# Patient Record
Sex: FEMALE | Race: BLACK OR AFRICAN AMERICAN | Employment: OTHER | URBAN - METROPOLITAN AREA
[De-identification: names, ages, dates, MRNs, and addresses within clinical notes are randomized per-mention and may not be internally consistent; named-entity substitution may affect disease eponyms.]

---

## 2021-03-22 ENCOUNTER — HOSPITAL ENCOUNTER (INPATIENT)
Age: 76
LOS: 17 days | Discharge: NURSING, REHAB OR LONG-TERM CARE | DRG: 689 | End: 2021-04-08
Attending: EMERGENCY MEDICINE | Admitting: INTERNAL MEDICINE
Payer: MEDICARE

## 2021-03-22 ENCOUNTER — APPOINTMENT (OUTPATIENT)
Dept: CT IMAGING | Age: 76
DRG: 689 | End: 2021-03-22
Attending: EMERGENCY MEDICINE
Payer: MEDICARE

## 2021-03-22 DIAGNOSIS — G93.40 ENCEPHALOPATHY: ICD-10-CM

## 2021-03-22 DIAGNOSIS — F20.9 SCHIZOPHRENIA, UNSPECIFIED TYPE (HCC): ICD-10-CM

## 2021-03-22 DIAGNOSIS — F03.90 DEMENTIA WITHOUT BEHAVIORAL DISTURBANCE, UNSPECIFIED DEMENTIA TYPE: ICD-10-CM

## 2021-03-22 DIAGNOSIS — R41.0 DELIRIUM: Primary | ICD-10-CM

## 2021-03-22 DIAGNOSIS — N89.8 VAGINAL DISCHARGE: ICD-10-CM

## 2021-03-22 DIAGNOSIS — Z51.5 ENCOUNTER FOR PALLIATIVE CARE: ICD-10-CM

## 2021-03-22 DIAGNOSIS — N39.0 URINARY TRACT INFECTION WITHOUT HEMATURIA, SITE UNSPECIFIED: ICD-10-CM

## 2021-03-22 DIAGNOSIS — R13.10 DYSPHAGIA, UNSPECIFIED TYPE: ICD-10-CM

## 2021-03-22 PROBLEM — G93.41 ACUTE METABOLIC ENCEPHALOPATHY: Status: ACTIVE | Noted: 2021-03-22

## 2021-03-22 LAB
ALBUMIN SERPL-MCNC: 4.1 G/DL (ref 3.2–4.6)
ALBUMIN/GLOB SERPL: 1.2 {RATIO} (ref 1.2–3.5)
ALP SERPL-CCNC: 68 U/L (ref 50–136)
ALT SERPL-CCNC: 16 U/L (ref 12–65)
ANION GAP SERPL CALC-SCNC: 8 MMOL/L (ref 7–16)
AST SERPL-CCNC: 30 U/L (ref 15–37)
ATRIAL RATE: 85 BPM
BACTERIA URNS QL MICRO: ABNORMAL /HPF
BASOPHILS # BLD: 0 K/UL (ref 0–0.2)
BASOPHILS NFR BLD: 1 % (ref 0–2)
BILIRUB SERPL-MCNC: 0.6 MG/DL (ref 0.2–1.1)
BUN SERPL-MCNC: 12 MG/DL (ref 8–23)
CALCIUM SERPL-MCNC: 9.7 MG/DL (ref 8.3–10.4)
CALCULATED P AXIS, ECG09: 80 DEGREES
CALCULATED R AXIS, ECG10: 82 DEGREES
CALCULATED T AXIS, ECG11: 70 DEGREES
CASTS URNS QL MICRO: 0 /LPF
CHLORIDE SERPL-SCNC: 110 MMOL/L (ref 98–107)
CO2 SERPL-SCNC: 25 MMOL/L (ref 21–32)
CREAT SERPL-MCNC: 1.15 MG/DL (ref 0.6–1)
CRYSTALS URNS QL MICRO: 0 /LPF
DIAGNOSIS, 93000: NORMAL
DIFFERENTIAL METHOD BLD: ABNORMAL
EOSINOPHIL # BLD: 0 K/UL (ref 0–0.8)
EOSINOPHIL NFR BLD: 1 % (ref 0.5–7.8)
EPI CELLS #/AREA URNS HPF: ABNORMAL /HPF
ERYTHROCYTE [DISTWIDTH] IN BLOOD BY AUTOMATED COUNT: 14.5 % (ref 11.9–14.6)
GLOBULIN SER CALC-MCNC: 3.3 G/DL (ref 2.3–3.5)
GLUCOSE BLD STRIP.AUTO-MCNC: 136 MG/DL (ref 65–100)
GLUCOSE SERPL-MCNC: 89 MG/DL (ref 65–100)
HCT VFR BLD AUTO: 38.5 % (ref 35.8–46.3)
HGB BLD-MCNC: 12.1 G/DL (ref 11.7–15.4)
IMM GRANULOCYTES # BLD AUTO: 0 K/UL (ref 0–0.5)
IMM GRANULOCYTES NFR BLD AUTO: 0 % (ref 0–5)
LACTATE SERPL-SCNC: 1.5 MMOL/L (ref 0.4–2)
LACTATE SERPL-SCNC: 1.7 MMOL/L (ref 0.4–2)
LYMPHOCYTES # BLD: 1.5 K/UL (ref 0.5–4.6)
LYMPHOCYTES NFR BLD: 37 % (ref 13–44)
MAGNESIUM SERPL-MCNC: 1.9 MG/DL (ref 1.8–2.4)
MCH RBC QN AUTO: 30.3 PG (ref 26.1–32.9)
MCHC RBC AUTO-ENTMCNC: 31.4 G/DL (ref 31.4–35)
MCV RBC AUTO: 96.3 FL (ref 79.6–97.8)
MONOCYTES # BLD: 0.4 K/UL (ref 0.1–1.3)
MONOCYTES NFR BLD: 9 % (ref 4–12)
MUCOUS THREADS URNS QL MICRO: 0 /LPF
NEUTS SEG # BLD: 2.1 K/UL (ref 1.7–8.2)
NEUTS SEG NFR BLD: 53 % (ref 43–78)
NRBC # BLD: 0 K/UL (ref 0–0.2)
OTHER OBSERVATIONS,UCOM: ABNORMAL
P-R INTERVAL, ECG05: 118 MS
PLATELET # BLD AUTO: 164 K/UL (ref 150–450)
PMV BLD AUTO: 9.9 FL (ref 9.4–12.3)
POTASSIUM SERPL-SCNC: 4.3 MMOL/L (ref 3.5–5.1)
PROT SERPL-MCNC: 7.4 G/DL (ref 6.3–8.2)
Q-T INTERVAL, ECG07: 342 MS
QRS DURATION, ECG06: 62 MS
QTC CALCULATION (BEZET), ECG08: 406 MS
RBC # BLD AUTO: 4 M/UL (ref 4.05–5.2)
RBC #/AREA URNS HPF: ABNORMAL /HPF
SERVICE CMNT-IMP: ABNORMAL
SODIUM SERPL-SCNC: 143 MMOL/L (ref 136–145)
TROPONIN-HIGH SENSITIVITY: 28.5 PG/ML (ref 0–14)
TSH SERPL DL<=0.005 MIU/L-ACNC: 0.54 UIU/ML (ref 0.36–3.74)
VENTRICULAR RATE, ECG03: 85 BPM
WBC # BLD AUTO: 4.1 K/UL (ref 4.3–11.1)
WBC URNS QL MICRO: ABNORMAL /HPF

## 2021-03-22 PROCEDURE — 86580 TB INTRADERMAL TEST: CPT | Performed by: INTERNAL MEDICINE

## 2021-03-22 PROCEDURE — 84484 ASSAY OF TROPONIN QUANT: CPT

## 2021-03-22 PROCEDURE — 83735 ASSAY OF MAGNESIUM: CPT

## 2021-03-22 PROCEDURE — 93005 ELECTROCARDIOGRAM TRACING: CPT | Performed by: EMERGENCY MEDICINE

## 2021-03-22 PROCEDURE — 82962 GLUCOSE BLOOD TEST: CPT

## 2021-03-22 PROCEDURE — 81015 MICROSCOPIC EXAM OF URINE: CPT

## 2021-03-22 PROCEDURE — 87086 URINE CULTURE/COLONY COUNT: CPT

## 2021-03-22 PROCEDURE — 74011250636 HC RX REV CODE- 250/636: Performed by: INTERNAL MEDICINE

## 2021-03-22 PROCEDURE — 85025 COMPLETE CBC W/AUTO DIFF WBC: CPT

## 2021-03-22 PROCEDURE — 84443 ASSAY THYROID STIM HORMONE: CPT

## 2021-03-22 PROCEDURE — 70450 CT HEAD/BRAIN W/O DYE: CPT

## 2021-03-22 PROCEDURE — 87040 BLOOD CULTURE FOR BACTERIA: CPT

## 2021-03-22 PROCEDURE — 74011000258 HC RX REV CODE- 258: Performed by: EMERGENCY MEDICINE

## 2021-03-22 PROCEDURE — 74011250636 HC RX REV CODE- 250/636: Performed by: EMERGENCY MEDICINE

## 2021-03-22 PROCEDURE — 74011000302 HC RX REV CODE- 302: Performed by: INTERNAL MEDICINE

## 2021-03-22 PROCEDURE — 83605 ASSAY OF LACTIC ACID: CPT

## 2021-03-22 PROCEDURE — 96374 THER/PROPH/DIAG INJ IV PUSH: CPT

## 2021-03-22 PROCEDURE — 80053 COMPREHEN METABOLIC PANEL: CPT

## 2021-03-22 PROCEDURE — 65660000000 HC RM CCU STEPDOWN

## 2021-03-22 PROCEDURE — 99283 EMERGENCY DEPT VISIT LOW MDM: CPT

## 2021-03-22 RX ORDER — ONDANSETRON 2 MG/ML
4 INJECTION INTRAMUSCULAR; INTRAVENOUS
Status: DISCONTINUED | OUTPATIENT
Start: 2021-03-22 | End: 2021-04-08 | Stop reason: HOSPADM

## 2021-03-22 RX ORDER — ENOXAPARIN SODIUM 100 MG/ML
40 INJECTION SUBCUTANEOUS DAILY
Status: DISCONTINUED | OUTPATIENT
Start: 2021-03-23 | End: 2021-04-08 | Stop reason: HOSPADM

## 2021-03-22 RX ORDER — POLYETHYLENE GLYCOL 3350 17 G/17G
17 POWDER, FOR SOLUTION ORAL DAILY PRN
Status: DISCONTINUED | OUTPATIENT
Start: 2021-03-22 | End: 2021-04-08 | Stop reason: HOSPADM

## 2021-03-22 RX ORDER — HALOPERIDOL 5 MG/ML
INJECTION INTRAMUSCULAR
Status: DISCONTINUED
Start: 2021-03-22 | End: 2021-03-22 | Stop reason: WASHOUT

## 2021-03-22 RX ORDER — ACETAMINOPHEN 650 MG/1
650 SUPPOSITORY RECTAL
Status: DISCONTINUED | OUTPATIENT
Start: 2021-03-22 | End: 2021-04-08 | Stop reason: HOSPADM

## 2021-03-22 RX ORDER — PROMETHAZINE HYDROCHLORIDE 25 MG/1
12.5 TABLET ORAL
Status: DISCONTINUED | OUTPATIENT
Start: 2021-03-22 | End: 2021-04-08 | Stop reason: HOSPADM

## 2021-03-22 RX ORDER — ACETAMINOPHEN 325 MG/1
650 TABLET ORAL
Status: DISCONTINUED | OUTPATIENT
Start: 2021-03-22 | End: 2021-04-08 | Stop reason: HOSPADM

## 2021-03-22 RX ORDER — HYDRALAZINE HYDROCHLORIDE 20 MG/ML
20 INJECTION INTRAMUSCULAR; INTRAVENOUS
Status: DISCONTINUED | OUTPATIENT
Start: 2021-03-22 | End: 2021-04-08 | Stop reason: HOSPADM

## 2021-03-22 RX ORDER — SODIUM CHLORIDE 0.9 % (FLUSH) 0.9 %
5-40 SYRINGE (ML) INJECTION EVERY 8 HOURS
Status: DISCONTINUED | OUTPATIENT
Start: 2021-03-22 | End: 2021-04-08 | Stop reason: HOSPADM

## 2021-03-22 RX ORDER — SODIUM CHLORIDE 0.9 % (FLUSH) 0.9 %
5-40 SYRINGE (ML) INJECTION AS NEEDED
Status: DISCONTINUED | OUTPATIENT
Start: 2021-03-22 | End: 2021-04-08 | Stop reason: HOSPADM

## 2021-03-22 RX ADMIN — Medication 10 ML: at 17:25

## 2021-03-22 RX ADMIN — Medication 10 ML: at 22:00

## 2021-03-22 RX ADMIN — CEFTRIAXONE SODIUM 1 G: 1 INJECTION, POWDER, FOR SOLUTION INTRAMUSCULAR; INTRAVENOUS at 13:57

## 2021-03-22 RX ADMIN — TUBERCULIN PURIFIED PROTEIN DERIVATIVE 5 UNITS: 5 INJECTION, SOLUTION INTRADERMAL at 17:23

## 2021-03-22 RX ADMIN — HYDRALAZINE HYDROCHLORIDE 20 MG: 20 INJECTION, SOLUTION INTRAMUSCULAR; INTRAVENOUS at 17:25

## 2021-03-22 NOTE — ED TRIAGE NOTES
Patient ambulatory to triage with mask in place. Family reports she has been acting right since 3/18. Family is concerned about UTI or CVA. Family reports she has been dragging her legs and has not had an appetite.

## 2021-03-22 NOTE — PROGRESS NOTES
TRANSFER - IN REPORT:    Verbal report received from humera(name) on [place]  being received from er(unit) for routine progression of care      Report consisted of patients Situation, Background, Assessment and   Recommendations(SBAR). Information from the following report(s) ED Summary was reviewed with the receiving nurse. Opportunity for questions and clarification was provided. Assessment completed upon patients arrival to unit and care assumed. awaiting arrival, report to kyrie garcia

## 2021-03-22 NOTE — ED NOTES
TRANSFER - OUT REPORT:    Verbal report given to [facility] 21 (name) on [facility]  being transferred to [address] [place] [address] (unit) for routine progression of care       Report consisted of patients Situation, Background, Assessment and   Recommendations(SBAR). Information from the following report(s) SBAR was reviewed with the receiving nurse. Lines:   Peripheral IV 03/22/21 Left Hand (Active)        Opportunity for questions and clarification was provided.       Patient transported with:   [employer]

## 2021-03-22 NOTE — PROGRESS NOTES
Pt agitated and restless. MD made aware, orders for soft wrist restraints placed. Will continue to monitor.

## 2021-03-22 NOTE — ED PROVIDER NOTES
The history is provided by a relative. Altered mental status   This is a recurrent problem. Episode onset: 4 days ago. Associated symptoms include confusion. Pertinent negatives include no hallucinations. Associated symptoms comments: Speaking less and increased difficulty with gait. Risk factors include dementia (schizophrenia). Her past medical history is significant for dementia and psychotropic medication treatment. Past medical history comments: kidney cancer, htn no meds now, schizophrenia. No past medical history on file. No past surgical history on file. No family history on file.     Social History     Socioeconomic History    Marital status: Not on file     Spouse name: Not on file    Number of children: Not on file    Years of education: Not on file    Highest education level: Not on file   Occupational History    Not on file   Social Needs    Financial resource strain: Not on file    Food insecurity     Worry: Not on file     Inability: Not on file    Transportation needs     Medical: Not on file     Non-medical: Not on file   Tobacco Use    Smoking status: Not on file   Substance and Sexual Activity    Alcohol use: Not on file    Drug use: Not on file    Sexual activity: Not on file   Lifestyle    Physical activity     Days per week: Not on file     Minutes per session: Not on file    Stress: Not on file   Relationships    Social connections     Talks on phone: Not on file     Gets together: Not on file     Attends Mandaen service: Not on file     Active member of club or organization: Not on file     Attends meetings of clubs or organizations: Not on file     Relationship status: Not on file    Intimate partner violence     Fear of current or ex partner: Not on file     Emotionally abused: Not on file     Physically abused: Not on file     Forced sexual activity: Not on file   Other Topics Concern    Not on file   Social History Narrative    Not on file ALLERGIES: Patient has no known allergies. Review of Systems   Unable to perform ROS: Mental status change   Constitutional: Negative for chills and fever. HENT: Negative for congestion and rhinorrhea. Respiratory: Negative for cough and shortness of breath. Gastrointestinal: Negative for diarrhea and vomiting. Genitourinary:        Foul odor for months to urine per son     Psychiatric/Behavioral: Positive for confusion. Negative for hallucinations. Vitals:    03/22/21 1122   BP: (!) 172/104   Pulse: 65   Resp: 16   Temp: 98.1 °F (36.7 °C)   SpO2: 98%   Weight: 63.5 kg (140 lb)   Height: 5' 4\" (1.626 m)            Physical Exam  Vitals signs and nursing note reviewed. Constitutional:       General: She is not in acute distress. Appearance: She is well-developed. HENT:      Head: Normocephalic. Eyes:      General: No visual field deficit. Pupils: Pupils are equal, round, and reactive to light. Cardiovascular:      Rate and Rhythm: Normal rate and regular rhythm. Heart sounds: Normal heart sounds. Pulmonary:      Effort: Pulmonary effort is normal.      Breath sounds: Normal breath sounds. Abdominal:      General: There is no distension. Palpations: Abdomen is soft. There is no mass. Tenderness: There is no abdominal tenderness. There is no guarding or rebound. Musculoskeletal: Normal range of motion. Lymphadenopathy:      Cervical: No cervical adenopathy. Skin:     General: Skin is warm and dry. Neurological:      Mental Status: She is alert. GCS: GCS eye subscore is 4. GCS verbal subscore is 3. GCS motor subscore is 6. Cranial Nerves: Dysarthria present. No cranial nerve deficit or facial asymmetry. Sensory: No sensory deficit. Motor: No weakness. Coordination: Coordination abnormal ( oversoots rt finger to nose, unable to perform heel to shin).       Gait: Gait abnormal.   Psychiatric:         Attention and Perception: She is inattentive. Speech: Speech is delayed and slurred. Behavior: Behavior is slowed. MDM  Number of Diagnoses or Management Options  Diagnosis management comments: She has a history of schizophrenia and dementia but is off her baseline since the 18th with increased confusion or difficulty with gait and a less speech. She has been less responsive. Patient's son denies hallucinations on her part. Work-up reveals urinary tract infection and CT of the brain was normal for age. No cough or congestion to suggest pneumonia. Patient will be cultured and UTI treated with Rocephin. Will consult hospitalist for admission given increased confusion and difficulty managing patient at home. Admit for UTI and delirium.        Amount and/or Complexity of Data Reviewed  Clinical lab tests: ordered and reviewed (Results for orders placed or performed during the hospital encounter of 03/22/21  -CBC WITH AUTOMATED DIFF       Result                      Value             Ref Range           WBC                         4.1 (L)           4.3 - 11.1 K*       RBC                         4.00 (L)          4.05 - 5.2 M*       HGB                         12.1              11.7 - 15.4 *       HCT                         38.5              35.8 - 46.3 %       MCV                         96.3              79.6 - 97.8 *       MCH                         30.3              26.1 - 32.9 *       MCHC                        31.4              31.4 - 35.0 *       RDW                         14.5              11.9 - 14.6 %       PLATELET                    164               150 - 450 K/*       MPV                         9.9               9.4 - 12.3 FL       ABSOLUTE NRBC               0.00              0.0 - 0.2 K/*       DF                          AUTOMATED                             NEUTROPHILS                 53                43 - 78 %           LYMPHOCYTES                 37                13 - 44 %           MONOCYTES 9                 4.0 - 12.0 %        EOSINOPHILS                 1                 0.5 - 7.8 %         BASOPHILS                   1                 0.0 - 2.0 %         IMMATURE GRANULOCYTES       0                 0.0 - 5.0 %         ABS. NEUTROPHILS            2.1               1.7 - 8.2 K/*       ABS. LYMPHOCYTES            1.5               0.5 - 4.6 K/*       ABS. MONOCYTES              0.4               0.1 - 1.3 K/*       ABS. EOSINOPHILS            0.0               0.0 - 0.8 K/*       ABS. BASOPHILS              0.0               0.0 - 0.2 K/*       ABS. IMM. GRANS.            0.0               0.0 - 0.5 K/*  -METABOLIC PANEL, COMPREHENSIVE       Result                      Value             Ref Range           Sodium                      143               136 - 145 mm*       Potassium                   4.3               3.5 - 5.1 mm*       Chloride                    110 (H)           98 - 107 mmo*       CO2                         25                21 - 32 mmol*       Anion gap                   8                 7 - 16 mmol/L       Glucose                     89                65 - 100 mg/*       BUN                         12                8 - 23 MG/DL        Creatinine                  1.15 (H)          0.6 - 1.0 MG*       GFR est AA                  59 (L)            >60 ml/min/1*       GFR est non-AA              49 (L)            >60 ml/min/1*       Calcium                     9.7               8.3 - 10.4 M*       Bilirubin, total            0.6               0.2 - 1.1 MG*       ALT (SGPT)                  16                12 - 65 U/L         AST (SGOT)                  30                15 - 37 U/L         Alk.  phosphatase            68                50 - 136 U/L        Protein, total              7.4               6.3 - 8.2 g/*       Albumin                     4.1               3.2 - 4.6 g/*       Globulin                    3.3               2.3 - 3.5 g/*       A-G Ratio 1.2               1.2 - 3.5      -LACTIC ACID       Result                      Value             Ref Range           Lactic acid                 1.5               0.4 - 2.0 MM*  -URINE MICROSCOPIC       Result                      Value             Ref Range           WBC                         20-50             0 /hpf              RBC                         0-3               0 /hpf              Epithelial cells            0-3               0 /hpf              Bacteria                    4+ (H)            0 /hpf              Casts                       0                 0 /lpf              Crystals, urine             0                 0 /LPF              Mucus                       0                 0 /lpf              Other observations                                            RESULTS VERIFIED MANUALLY  -LACTIC ACID       Result                      Value             Ref Range           Lactic acid                 1.7               0.4 - 2.0 MM*  -MAGNESIUM       Result                      Value             Ref Range           Magnesium                   1.9               1.8 - 2.4 mg*  -TROPONIN-HIGH SENSITIVITY       Result                      Value             Ref Range           Troponin-High Sensitiv*     28.5 (H)          0 - 14 pg/mL   -TSH 3RD GENERATION       Result                      Value             Ref Range           TSH                         0.537             0.358 - 3.74*  -EKG, 12 LEAD, INITIAL       Result                      Value             Ref Range           Ventricular Rate            85                BPM                 Atrial Rate                 85                BPM                 P-R Interval                118               ms                  QRS Duration                62                ms                  Q-T Interval                342               ms                  QTC Calculation (Bezet)     406               ms                  Calculated P Axis 80                degrees             Calculated R Axis           82                degrees             Calculated T Axis           70                degrees             Diagnosis                                                     !! AGE AND GENDER SPECIFIC ECG ANALYSIS !! Normal sinus rhythm   Possible Left atrial enlargement   Borderline ECG   When compared with ECG of 22-MAR-2021 12:50,   Premature ventricular complexes are no longer Present   Premature supraventricular complexes are no longer Present     )  Tests in the radiology section of CPT®: ordered and reviewed (Ct Head Wo Cont    Result Date: 3/22/2021  CT Brain Without Contrast Dated  March 22, 2021 Reference Exam: None Indication: Difficulty speaking with gait disturbance Technique: Radiation dose reduction techniques were used for this study using one or more of the following: automated exposure control; adjustment of mA and/or kV (according to patient size);  iterative reconstruction. Routine CT of the brain was performed using 5 mm axial images obtained. Images are presumed to have been obtained less than 24 hours from presentation. Findings:  The ventricular system, basilar cisterns, and cortical sulci all are normal in size and position for the patient's age. There are no abnormal intracranial masses, mass effect, nor extra-axial collections seen. There are no abnormal areas of attenuation that would indicate a recent intracranial hemorrhage or infarction. Bone windows show no fractures nor foreign bodies.      Normal CT of the brain for age done without contrast.     )  Decide to obtain previous medical records or to obtain history from someone other than the patient: yes  Obtain history from someone other than the patient: yes  Review and summarize past medical records: yes    Risk of Complications, Morbidity, and/or Mortality  Presenting problems: moderate  Diagnostic procedures: low  Management options: low    Patient Progress  Patient progress: stable         Procedures

## 2021-03-22 NOTE — PROGRESS NOTES
Pt resting in bed comfortably at this time, alert and oriented to person. No distress noted, respirations even and unlabored on room air. Visually the patient is not experiencing pain at this time. Will continue to monitor. Will prepare for bedside shift report.

## 2021-03-22 NOTE — H&P
[facility]  History and Physical    Patient ID:  Alba Goff  female  1945  [id number]    Admission Date: 3/22/2021  Chief Complaint: altered mental status  Reason for Admission: acute metabolic encephalopathy/ UTI    ASSESSMENT & PLAN:  # Acute metabolic encephalopathy  # UTI  - continue Rocephin  - follow for cultures    # Dementia/ schizophrenia  - on austedo/ prolixa at home (do not carry those through pharmacy) but has been out for few weeks  - may need psych assistance for behavioral control but currently redirectable. - will do best with family present or sitter (ordered)  - consult case mgmt as family expresses they are overwhelmed at home    # elevated BP  - ?hx of HTN at home, not on meds  - prn Hydralazine      Disposition: home  Diet: regular  VTE ppx: lovenox  CODE STATUS: full code    HISTORY OF PRESENT ILLNESS:  Patient is a 67 y.o. female with medical h/o dementia, schizophrenia who presented to [facility] ER with report of several weeks progressive confusion and worsening agitation/confusion/ shuffling gait for past 3 days. Patient is minimally conversant so history obtained from son at bedside. She lives with her son with other family helping at times. They are overwhelmed by her needs and are concerned for her safety due to her wandering behaviors. Requesting CM assistance while here. Er workup notable for CT head negative, UA WBC 20-50, 4+ bacteria, CMP unremarkable, WBC 4.1, hgb 12    VS Hypertension -190s    No Known Allergies    None       No past medical history on file. No past surgical history on file. Social History     Tobacco Use    Smoking status: Not on file   Substance Use Topics    Alcohol use: Not on file       FAMILY HISTORY:  Reviewed and non contributory  No family history on file. REVIEW OF SYSTEMS:  A 14 point review of systems was taken and pertinent positive as per HPI.       PHYSICAL EXAM:    Visit Vitals  BP (!) 199/124   Pulse 96   Temp 98.1 °F (36.7 °C)   Resp 16   Ht 5' 4\" (1.626 m)   Wt 63.5 kg (140 lb)   SpO2 98%   BMI 24.03 kg/m²       General: No acute distress, speaking in full sentences, no use of accessory muscles   HEENT: Pupils equal and reactive to light and accommodation, oropharynx is clear   Neck: Supple, no lymphadenopathy, no JVD   Lungs: Clear to auscultation bilaterally   Cardiovascular: Regular rate and rhythm with normal S1 and S2   Abdomen: Soft, nontender, nondistended, normoactive bowel sounds   Extremities: No cyanosis clubbing or edema   Neuro: Nonfocal, A&O x3   Psych: Normal mood and affect     Intake/Output last 3 shifts:        Labs:  CMP:   Lab Results   Component Value Date/Time     03/22/2021 11:28 AM    K 4.3 03/22/2021 11:28 AM     (H) 03/22/2021 11:28 AM    CO2 25 03/22/2021 11:28 AM    AGAP 8 03/22/2021 11:28 AM    GLU 89 03/22/2021 11:28 AM    BUN 12 03/22/2021 11:28 AM    CREA 1.15 (H) 03/22/2021 11:28 AM    GFRAA 59 (L) 03/22/2021 11:28 AM    GFRNA 49 (L) 03/22/2021 11:28 AM    CA 9.7 03/22/2021 11:28 AM    MG 1.9 03/22/2021 11:28 AM    ALB 4.1 03/22/2021 11:28 AM    TBILI 0.6 03/22/2021 11:28 AM    TP 7.4 03/22/2021 11:28 AM    GLOB 3.3 03/22/2021 11:28 AM    AGRAT 1.2 03/22/2021 11:28 AM    ALT 16 03/22/2021 11:28 AM         CBC:    Lab Results   Component Value Date/Time    WBC 4.1 (L) 03/22/2021 11:28 AM    HGB 12.1 03/22/2021 11:28 AM    HCT 38.5 03/22/2021 11:28 AM     03/22/2021 11:28 AM       No results found for: INR, PTMR, PTP, PT1, PT2, INREXT    ABG:  No results found for: PH, PHI, PCO2, PCO2I, PO2, PO2I, HCO3, HCO3I, FIO2, FIO2I        No results found for: CPK, RCK1, RCK2, RCK3, RCK4, CKMB, CKNDX, CKND1, TROPT, TROIQ, BNPP, BNP      Imaging & Other Studies:    XR Results (maximum last 3): No results found for this or any previous visit. CT Results (maximum last 3):   Results from [place] encounter on 03/22/21   CT HEAD WO CONT    Narrative CT Brain Without Contrast Dated  March 22, 2021    Reference Exam: None    Indication: Difficulty speaking with gait disturbance    Technique: Radiation dose reduction techniques were used for this study using  one or more of the following: automated exposure control; adjustment of mA  and/or kV (according to patient size);  iterative reconstruction. Routine CT of  the brain was performed using 5 mm axial images obtained. Images are presumed to  have been obtained less than 24 hours from presentation. Findings:  The ventricular system, basilar cisterns, and cortical sulci all are  normal in size and position for the patient's age. There are no abnormal  intracranial masses, mass effect, nor extra-axial collections seen. There are  no abnormal areas of attenuation that would indicate a recent intracranial  hemorrhage or infarction. Bone windows show no fractures nor foreign bodies. Impression Normal CT of the brain for age done without contrast.            MRI Results (maximum last 3): No results found for this or any previous visit. Nuclear Medicine Results (maximum last 3): No results found for this or any previous visit. US Results (maximum last 3): No results found for this or any previous visit. DEXA Results (maximum last 3): No results found for this or any previous visit. CLAUDIA Results (maximum last 3): No results found for this or any previous visit. IR Results (maximum last 3): No results found for this or any previous visit. VAS/US Results (maximum last 3): No results found for this or any previous visit. PET Results (maximum last 3): No results found for this or any previous visit.        EKG Results     Procedure 720 Value Units Date/Time    EKG, 12 LEAD, INITIAL [[id number]] Collected: 03/22/21 1252    Order Status: Completed Updated: 03/22/21 1328     Ventricular Rate 85 BPM      Atrial Rate 85 BPM      P-R Interval 118 ms      QRS Duration 62 ms      Q-T Interval 342 ms      QTC Calculation (Bezet) 406 ms      Calculated P Axis 80 degrees      Calculated R Axis 82 degrees      Calculated T Axis 70 degrees      Diagnosis --     !! AGE AND GENDER SPECIFIC ECG ANALYSIS !!   Normal sinus rhythm  Possible Left atrial enlargement  Borderline ECG  When compared with ECG of 22-MAR-2021 12:50,  Premature ventricular complexes are no longer Present  Premature supraventricular complexes are no longer Present            Active Problems:  Patient Active Problem List    Diagnosis Date Noted    Acute metabolic encephalopathy 32/19/0645         DO [facility] Hospitalist Service  3/22/2021 2:27 PM

## 2021-03-23 LAB
ALBUMIN SERPL-MCNC: 3.8 G/DL (ref 3.2–4.6)
ALBUMIN/GLOB SERPL: 1.3 {RATIO} (ref 1.2–3.5)
ALP SERPL-CCNC: 64 U/L (ref 50–136)
ALT SERPL-CCNC: 14 U/L (ref 12–65)
AMMONIA PLAS-SCNC: 31 UMOL/L (ref 11–32)
ANION GAP SERPL CALC-SCNC: 9 MMOL/L (ref 7–16)
AST SERPL-CCNC: 19 U/L (ref 15–37)
BASOPHILS # BLD: 0 K/UL (ref 0–0.2)
BASOPHILS NFR BLD: 1 % (ref 0–2)
BILIRUB SERPL-MCNC: 0.6 MG/DL (ref 0.2–1.1)
BUN SERPL-MCNC: 13 MG/DL (ref 8–23)
CALCIUM SERPL-MCNC: 9.6 MG/DL (ref 8.3–10.4)
CHLORIDE SERPL-SCNC: 114 MMOL/L (ref 98–107)
CO2 SERPL-SCNC: 24 MMOL/L (ref 21–32)
CREAT SERPL-MCNC: 1.25 MG/DL (ref 0.6–1)
DIFFERENTIAL METHOD BLD: ABNORMAL
EOSINOPHIL # BLD: 0 K/UL (ref 0–0.8)
EOSINOPHIL NFR BLD: 0 % (ref 0.5–7.8)
ERYTHROCYTE [DISTWIDTH] IN BLOOD BY AUTOMATED COUNT: 14.1 % (ref 11.9–14.6)
GLOBULIN SER CALC-MCNC: 2.9 G/DL (ref 2.3–3.5)
GLUCOSE BLD STRIP.AUTO-MCNC: 134 MG/DL (ref 65–100)
GLUCOSE SERPL-MCNC: 104 MG/DL (ref 65–100)
HCT VFR BLD AUTO: 36.4 % (ref 35.8–46.3)
HGB BLD-MCNC: 11.6 G/DL (ref 11.7–15.4)
IMM GRANULOCYTES # BLD AUTO: 0 K/UL (ref 0–0.5)
IMM GRANULOCYTES NFR BLD AUTO: 0 % (ref 0–5)
LYMPHOCYTES # BLD: 1 K/UL (ref 0.5–4.6)
LYMPHOCYTES NFR BLD: 21 % (ref 13–44)
MCH RBC QN AUTO: 30.1 PG (ref 26.1–32.9)
MCHC RBC AUTO-ENTMCNC: 31.9 G/DL (ref 31.4–35)
MCV RBC AUTO: 94.5 FL (ref 79.6–97.8)
MM INDURATION POC: 0 MM (ref 0–5)
MONOCYTES # BLD: 0.5 K/UL (ref 0.1–1.3)
MONOCYTES NFR BLD: 10 % (ref 4–12)
NEUTS SEG # BLD: 3.2 K/UL (ref 1.7–8.2)
NEUTS SEG NFR BLD: 68 % (ref 43–78)
NRBC # BLD: 0 K/UL (ref 0–0.2)
PLATELET # BLD AUTO: 156 K/UL (ref 150–450)
PMV BLD AUTO: 9.7 FL (ref 9.4–12.3)
POTASSIUM SERPL-SCNC: 3.1 MMOL/L (ref 3.5–5.1)
PPD POC: NEGATIVE NEGATIVE
PROT SERPL-MCNC: 6.7 G/DL (ref 6.3–8.2)
RBC # BLD AUTO: 3.85 M/UL (ref 4.05–5.2)
SERVICE CMNT-IMP: ABNORMAL
SODIUM SERPL-SCNC: 147 MMOL/L (ref 136–145)
VIT B12 SERPL-MCNC: 697 PG/ML (ref 193–986)
WBC # BLD AUTO: 4.8 K/UL (ref 4.3–11.1)

## 2021-03-23 PROCEDURE — 74011250636 HC RX REV CODE- 250/636: Performed by: INTERNAL MEDICINE

## 2021-03-23 PROCEDURE — 82962 GLUCOSE BLOOD TEST: CPT

## 2021-03-23 PROCEDURE — 97166 OT EVAL MOD COMPLEX 45 MIN: CPT

## 2021-03-23 PROCEDURE — 36415 COLL VENOUS BLD VENIPUNCTURE: CPT

## 2021-03-23 PROCEDURE — 97530 THERAPEUTIC ACTIVITIES: CPT

## 2021-03-23 PROCEDURE — 97163 PT EVAL HIGH COMPLEX 45 MIN: CPT

## 2021-03-23 PROCEDURE — 74011250637 HC RX REV CODE- 250/637: Performed by: INTERNAL MEDICINE

## 2021-03-23 PROCEDURE — 74011000258 HC RX REV CODE- 258: Performed by: INTERNAL MEDICINE

## 2021-03-23 PROCEDURE — 97535 SELF CARE MNGMENT TRAINING: CPT

## 2021-03-23 PROCEDURE — 92610 EVALUATE SWALLOWING FUNCTION: CPT

## 2021-03-23 PROCEDURE — 65660000000 HC RM CCU STEPDOWN

## 2021-03-23 PROCEDURE — 80053 COMPREHEN METABOLIC PANEL: CPT

## 2021-03-23 PROCEDURE — 82140 ASSAY OF AMMONIA: CPT

## 2021-03-23 PROCEDURE — 51798 US URINE CAPACITY MEASURE: CPT

## 2021-03-23 PROCEDURE — 2709999900 HC NON-CHARGEABLE SUPPLY

## 2021-03-23 PROCEDURE — 85025 COMPLETE CBC W/AUTO DIFF WBC: CPT

## 2021-03-23 PROCEDURE — 82607 VITAMIN B-12: CPT

## 2021-03-23 RX ORDER — FLUPHENAZINE DECANOATE 25 MG/ML
2.5 INJECTION, SOLUTION INTRAMUSCULAR; SUBCUTANEOUS EVERY 2 WEEKS
COMMUNITY

## 2021-03-23 RX ORDER — POTASSIUM CHLORIDE 20 MEQ/1
20 TABLET, EXTENDED RELEASE ORAL
Status: COMPLETED | OUTPATIENT
Start: 2021-03-23 | End: 2021-03-23

## 2021-03-23 RX ORDER — DEXTROSE MONOHYDRATE 50 MG/ML
75 INJECTION, SOLUTION INTRAVENOUS CONTINUOUS
Status: DISCONTINUED | OUTPATIENT
Start: 2021-03-23 | End: 2021-03-28

## 2021-03-23 RX ORDER — DEUTETRABENAZINE 12 MG/1
12 TABLET, COATED ORAL 2 TIMES DAILY
COMMUNITY

## 2021-03-23 RX ADMIN — DEXTROSE MONOHYDRATE 75 ML/HR: 5 INJECTION, SOLUTION INTRAVENOUS at 08:27

## 2021-03-23 RX ADMIN — DEXTROSE MONOHYDRATE 75 ML/HR: 5 INJECTION, SOLUTION INTRAVENOUS at 23:42

## 2021-03-23 RX ADMIN — ENOXAPARIN SODIUM 40 MG: 40 INJECTION SUBCUTANEOUS at 08:25

## 2021-03-23 RX ADMIN — HYDRALAZINE HYDROCHLORIDE 20 MG: 20 INJECTION, SOLUTION INTRAMUSCULAR; INTRAVENOUS at 15:30

## 2021-03-23 RX ADMIN — POTASSIUM CHLORIDE 20 MEQ: 1500 TABLET, EXTENDED RELEASE ORAL at 08:26

## 2021-03-23 RX ADMIN — Medication 10 ML: at 06:00

## 2021-03-23 RX ADMIN — Medication 10 ML: at 22:00

## 2021-03-23 RX ADMIN — Medication 10 ML: at 13:42

## 2021-03-23 RX ADMIN — CEFTRIAXONE SODIUM 1 G: 1 INJECTION, POWDER, FOR SOLUTION INTRAMUSCULAR; INTRAVENOUS at 13:41

## 2021-03-23 NOTE — PROGRESS NOTES
ACUTE PHYSICAL THERAPY GOALS:  (Developed with and agreed upon by patient and/or caregiver. )  LTG:  (1.)Ms. Armani Cuevas will move from supine to sit and sit to supine, scoot up and down and roll side to side in bed INDEPENDENTLY with bed flat within 7 treatment day(s). (2.)Ms. Armani Cuevas will transfer from bed to chair and chair to bed with SUPERVISION using the least restrictive device within 7 treatment day(s). (3.)Ms. Armani Cuevas will ambulate with SUPERVISION for 150 feet with the least restrictive device within 7 treatment day(s). (4.)Ms. Armani Cuevas will ascend and descend 10 steps with MINIMAL ASSIST using handrail(s) and/or handheld assist within 7 days. ________________________________________________________________________________________________    PHYSICAL THERAPY ASSESSMENT: Initial Assessment and AM PT Treatment Day # 1      Catrachito Mar is a 67 y.o. female   PRIMARY DIAGNOSIS: <principal problem not specified>  Acute metabolic encephalopathy [R37.60]       Reason for Referral:  Encephalopathy, weakness, AMS  ICD-10: Treatment Diagnosis: Generalized Muscle Weakness (M62.81)  Difficulty in walking, Not elsewhere classified (R26.2)  Other abnormalities of gait and mobility (R26.89)  [place] / Product Type: Medicare /     ASSESSMENT:     REHAB RECOMMENDATIONS:   Recommendation to date pending progress:  Setting:   Short-term Rehab  Equipment:    To Be Determined     PRIOR LEVEL OF FUNCTION:  (Prior to Hospitalization) INITIAL/CURRENT LEVEL OF FUNCTION:  (Most Recently Demonstrated)   Bed Mobility:   Independent  Sit to Stand:   Independent  Transfers:   Independent  Gait/Mobility:   Independent Bed Mobility:   Standby Assistance  Sit to Stand:   Minimal Assistance  Transfers:   Minimal Assistance x 2  Gait/Mobility:   Minimal Assistance x 2     ASSESSMENT:  Ms. Armani Cuevas presents with altered mental status, flat affect, and limited command following.  She performs bed mobility with CGA/SBA for multiple supine <-> sit reps. Fair to fair+ seated balance at edge of bed. Min A of 1-2 for sit-stand transfers and ambulation in room x several short trials (longest was 10 ft) with slow pace, decreased step clearance. And fair- to poor balance. Pt returned to supine after activity with B wrist restraints and lap belt in place and bed alarm on. Ms. Gustavo Garvey is currently functioning below baseline with mobility, strength, balance, transfers, and activity tolerance as well as mental status. She will need continued therapy during hospital stay. Per discussion with son, pt may benefit from rehab at [place]. SUBJECTIVE:   Ms. Gustavo Garvey is non verbal throughout session    SOCIAL HISTORY/LIVING ENVIRONMENT: Spoke with son (evangelist) who reports pt lives with him and another son and daughter in two story home. His siblings are both moving out soon and he works. Patients bedroom is upstairs; she has been unable to navigate steps for the past 3-4 months so has bee staying upstairs. Needs some assistance with ADLs. Denies DME use or falls prior to admission. States patients speech and memory have been drastically declining.    Home Environment: Private residence  One/Two Story Residence: Two story  Living Alone: No  Support Systems: Child(segundo)  OBJECTIVE:     PAIN: VITAL SIGNS: LINES/DRAINS:   Pre Treatment: Pain Screen  Pain Scale 1: FLACC  Pain Intensity 1: 0  Post Treatment: 0/10 FLACC Vital Signs  O2 Device: Room air none  O2 Device: Room air     GROSS EVALUATION:   Within Functional Limits Abnormal/ Functional Abnormal/ Non-Functional (see comments) Not Tested Comments:   AROM [x] [] [] []    PROM [] [] [] []    Strength [] [x] [] []    Balance [] [x] [] []    Posture [] [x] [] []    Sensation [] [] [] []    Coordination [] [] [] []    Tone [] [] [] []    Edema [] [] [] []    Activity Tolerance [] [x] [] []     [] [] [] []      COGNITION/  PERCEPTION: Intact Impaired   (see comments) Comments:   Orientation [] [] Unable to verbalize   Vision [] []    Hearing [] []    Command Following [] [x]    Safety Awareness [] [x]     [] []      MOBILITY: I Mod I S SBA CGA Min Mod Max Total  NT x2 Comments:   Bed Mobility    Rolling [] [] [] [x] [] [] [] [] [] [] []    Supine to Sit [] [] [] [x] [] [] [] [] [] [] []    Scooting [] [] [] [x] [] [] [] [] [] [] []    Sit to Supine [] [] [] [x] [] [] [] [] [] [] []    Transfers    Sit to Stand [] [] [] [] [x] [x] [] [] [] [] [x]    Bed to Chair [] [] [] [] [] [x] [] [] [] [] [x]    Stand to Sit [] [] [] [] [x] [x] [] [] [] [] [x]    I=Independent, Mod I=Modified Independent, S=Supervision, SBA=Standby Assistance, CGA=Contact Guard Assistance,   Min=Minimal Assistance, Mod=Moderate Assistance, Max=Maximal Assistance, Total=Total Assistance, NT=Not Tested  GAIT: I Mod I S SBA CGA Min Mod Max Total  NT x2 Comments:   Level of Assistance [] [] [] [] [] [x] [] [] [] [] [x]    Distance Multiple short trials, longest was 10 ft    DME min HHA x2    Gait Quality Impaired balance, gait safety    Weightbearing Status N/A     I=Independent, Mod I=Modified Independent, S=Supervision, SBA=Standby Assistance, CGA=Contact Guard Assistance,   Min=Minimal Assistance, Mod=Moderate Assistance, Max=Maximal Assistance, Total=Total Assistance, NT=Not Tested    [address] [facility] [address] [address] [facility] [address] [facility] Mobility Inpatient Short Form       How much difficulty does the patient currently have. .. Unable A Lot A Little None   1. Turning over in bed (including adjusting bedclothes, sheets and blankets)? [] 1   [] 2   [] 3   [x] 4   2. Sitting down on and standing up from a chair with arms ( e.g., wheelchair, bedside commode, etc.)   [] 1   [] 2   [x] 3   [] 4   3. Moving from lying on back to sitting on the side of the bed? [] 1   [] 2   [x] 3   [] 4   How much help from another person does the patient currently need. .. Total A Lot A Little None   4.   Moving to and from a bed to a chair (including a wheelchair)? [] 1   [] 2   [x] 3   [] 4   5. Need to walk in hospital room? [] 1   [x] 2   [] 3   [] 4   6. Climbing 3-5 steps with a railing? [x] 1   [] 2   [] 3   [] 4   © 2007, Trustees of [address] [place] [id number], under license to [employer]. All rights reserved     Score:  Initial: 16 Most Recent: X (Date: -- )    Interpretation of Tool:  Represents activities that are increasingly more difficult (i.e. Bed mobility, Transfers, Gait). PLAN:   FREQUENCY/DURATION: PT Plan of Care: 3 times/week for duration of hospital stay or until stated goals are met, whichever comes first.    PROBLEM LIST:   (Skilled intervention is medically necessary to address:)  1. Decreased Activity Tolerance  2. Decreased Balance  3. Decreased Cognition  4. Decreased Coordination  5. Decreased Gait Ability  6. Decreased Strength  7. Decreased Transfer Abilities   INTERVENTIONS PLANNED:   (Benefits and precautions of physical therapy have been discussed with the patient.)  1. Therapeutic Activity  2. Therapeutic Exercise/HEP  3. Neuromuscular Re-education  4. Gait Training  5. Manual Therapy  6. Education     TREATMENT:     EVALUATION: High Complexity : (Untimed Charge)    TREATMENT:   ($$ Therapeutic Activity: 8-22 mins    )  Co-Treatment PT/OT necessary due to patient's decreased overall endurance/tolerance levels, as well as need for high level skilled assistance to complete functional transfers/mobility and functional tasks  Therapeutic Activity (10 Minutes): Therapeutic activity included Rolling, Supine to Sit, Sit to Supine, Scooting, Transfer Training, Ambulation on level ground, Sitting balance  and Standing balance to improve functional Mobility, Strength, Activity tolerance and balance.     TREATMENT GRID:  N/A    AFTER TREATMENT POSITION/PRECAUTIONS:  Bed, Needs within reach, Restraints  and RN notified    INTERDISCIPLINARY COLLABORATION:  RN/PCT, PT/PTA and OT/HUERTAS    TOTAL TREATMENT DURATION:  PT Patient Time In/Time Out  Time In: 0900  Time Out: XIMENA Mireles

## 2021-03-23 NOTE — PROGRESS NOTES
ACUTE OT GOALS:  (Developed with and agreed upon by patient and/or caregiver.)  1. Patient will bathe and dress total body with moderate assistance and adaptive device as needed. 2. Patient will toilet with maximal assistance and adaptive device as needed. 3. Patient will tolerate 30 minutes of OT treatment with up to 2 rest breaks to increase activity tolerance for ADLs. 4. Patient will complete functional mobility for ADLs with supervision and adaptive device as needed. 5. Patient will demonstrate improved cognition for ADLs by completing functional tasks with up to moderate cueing from therapist.    Timeframe: 7 visits     OCCUPATIONAL THERAPY ASSESSMENT: Initial Assessment and Daily Note OT Treatment Day # 1    Nicki Michele is a 67 y.o. female   PRIMARY DIAGNOSIS: <principal problem not specified>  Acute metabolic encephalopathy [T68.20]       Reason for Referral:  Weakness  ICD-10: Treatment Diagnosis: Generalized Muscle Weakness (M62.81)  Other lack of cordination (R27.8)  [place] / Product Type: Medicare /   ASSESSMENT:     REHAB RECOMMENDATIONS:   Recommendation to date pending progress:  Setting:   Short-term Rehab  Equipment:    To Be Determined     PRIOR LEVEL OF FUNCTION:  (Prior to Hospitalization)  INITIAL/CURRENT LEVEL OF FUNCTION:  (Based on today's evaluation)   Bathing:   Minimal Assistance  Dressing:   Minimal Assistance  Feeding/Grooming:   Minimal Assistance  Toileting:   Minimal Assistance  Functional Mobility:   Independent Bathing:   Maximal Assistance  Dressing:   Maximal Assistance  Feeding/Grooming:   Maximal Assistance  Toileting:   Total Assistance  Functional Mobility:   Minimal Assistance     ASSESSMENT:  Ms. Rogelio Tran presents with metabolic encephalopathy. Hx dementia, schizophrenia, per chart has had recent and drastic decline in memory and speech, increased confusion and wandering.  At baseline pt lives with children, completes ADLs with some assistance and ambulates independently. Pt with flat affect, tremulous jaw, non-verbal and minimal command following. Pt with deficits in cognition, strength, balance, and endurance impacting mobility and ADLs. Pt practiced multiple repetitions of bed mobility and functional transfers with CGA, ambulation with HHAx2-Danielle. MaxA to groom, total assist to don gown after independently removing it. Pt is functioning below baseline and would benefit from continued OT to increase safety and independence. SUBJECTIVE:   Ms. Mayank Ramey largely non verbal, able to mumble indiscernibly at times. SOCIAL HISTORY/LIVING ENVIRONMENT: Per PT note, pt lives with children in a 2 level home with bedroom upstairs. Pt receives some assistance with ADLs, is independent with ambulation with no hx falls.  Pt's speech and memory have been drastically declining and pt will not have 24/7 supervision as son works   Home Environment: Private residence  [facility] Residence: Two story  Living Alone: No  [place]: Child(segundo)    OBJECTIVE:     PAIN: VITAL SIGNS: LINES/DRAINS:   Pre Treatment: Pain Screen  Pain Scale 1: FLACC  Pain Intensity 1: 0  Post Treatment: same Vital Signs  O2 Device: Room air   O2 Device: Room air     GROSS EVALUATION:  BUEs Within Functional Limits Abnormal/ Functional Abnormal/ Non-Functional (see comments) Not Tested Comments:   AROM [] [x] [] []    PROM [] [] [] []    Strength [] [x] [] []    Balance [] [x] [] [] Intact sitting, fair- standing   Posture [x] [] [] []    Sensation [] [] [] []    Coordination [x] [] [] []    Tone [] [] [] []    Edema [x] [] [] []    Activity Tolerance [] [x] [] []     [] [] [] []      COGNITION/  PERCEPTION: Intact Impaired   (see comments) Comments:   Orientation [] [x]    Vision [] []    Hearing [] []    Judgment/ Insight [] [x]    Attention [] [x]    Memory [] [x]    Command Following [] [x]    Emotional Regulation [] []     [] [] ACTIVITIES OF DAILY LIVING: I Mod I S SBA CGA Min Mod Max Total NT Comments   BASIC ADLs:              Bathing/ Showering [] [] [] [] [] [] [] [] [] [x]    Toileting [] [] [] [] [] [] [] [] [] [x]    Dressing [] [] [] [] [] [] [] [x] [] [] TA don socks, MaxA don gown   Feeding [] [] [] [] [] [] [] [] [] [x]    Grooming [] [] [] [] [] [] [] [x] [] [] MaxA wash face, pt able to participate   Personal Device Care [] [] [] [] [] [] [] [] [] [x]    Functional Mobility [] [] [] [] [] [x] [] [] [] [] HHA x2   I=Independent, Mod I=Modified Independent, S=Supervision, SBA=Standby Assistance, CGA=Contact Guard Assistance,   Min=Minimal Assistance, Mod=Moderate Assistance, Max=Maximal Assistance, Total=Total Assistance, NT=Not Tested    MOBILITY: I Mod I S SBA CGA Min Mod Max Total  NT x2 Comments:   Supine to sit [] [] [] [x] [x] [] [] [] [] [] []    Sit to supine [] [] [] [x] [x] [] [] [] [] [] []    Sit to stand [] [] [] [] [x] [] [] [] [] [] [x]    Bed to chair [] [] [] [] [] [x] [] [] [] [] [x]    I=Independent, Mod I=Modified Independent, S=Supervision, SBA=Standby Assistance, CGA=Contact Guard Assistance,   Min=Minimal Assistance, Mod=Moderate Assistance, Max=Maximal Assistance, Total=Total Assistance, NT=Not Tested    [employer] 6 Clicks   Daily Activity Inpatient Short Form        How much help from another person does the patient currently need. .. Total A Lot A Little None   1. Putting on and taking off regular lower body clothing? [x] 1   [] 2   [] 3   [] 4   2. Bathing (including washing, rinsing, drying)? [] 1   [x] 2   [] 3   [] 4   3. Toileting, which includes using toilet, bedpan or urinal?   [x] 1   [] 2   [] 3   [] 4   4. Putting on and taking off regular upper body clothing? [] 1   [x] 2   [] 3   [] 4   5. Taking care of personal grooming such as brushing teeth? [] 1   [x] 2   [] 3   [] 4   6. Eating meals?    [] 1   [x] 2   [] 3   [] 4   © 2007, Trustees of [employer], under license to [employer]. All rights reserved     Score:  Initial: 10 3/23/21 Most Recent: X (Date: -- )   Interpretation of Tool:  Represents activities that are increasingly more difficult (i.e. Bed mobility, Transfers, Gait). PLAN:   FREQUENCY/DURATION: OT Plan of Care: 3 times/week for duration of hospital stay or until stated goals are met, whichever comes first.    PROBLEM LIST:   (Skilled intervention is medically necessary to address:)  1. Decreased ADL/Functional Activities  2. Decreased Activity Tolerance  3. Decreased AROM/PROM  4. Decreased Balance  5. Decreased Cognition  6. Decreased Gait Ability  7. Decreased Strength  8. Decreased Transfer Abilities   INTERVENTIONS PLANNED:   (Benefits and precautions of occupational therapy have been discussed with the patient.)  1. Self Care Training  2. Therapeutic Activity  3. Therapeutic Exercise/HEP  4. Neuromuscular Re-education  5. Gait Training  6. Education     TREATMENT:     EVALUATION: Moderate Complexity : (Untimed Charge)    TREATMENT:   ($$ Self Care/Home Management: 8-22 mins    )  Co-Treatment PT/OT necessary due to patient's decreased overall endurance/tolerance levels, as well as need for high level skilled assistance to complete functional transfers/mobility and functional tasks  Self Care (10 Minutes): Self care including Upper Body Dressing, Lower Body Dressing and Grooming to increase independence and decrease level of assistance required.     TREATMENT GRID:  N/A    AFTER TREATMENT POSITION/PRECAUTIONS:  Bed, Needs within reach and Restraints  Lap belt and B soft wrist restraints replaced     INTERDISCIPLINARY COLLABORATION:  RN/PCT, PT/PTA and OT/HUERTAS    TOTAL TREATMENT DURATION:  OT Patient Time In/Time Out  Time In: 0901  Time Out: 9908    Erica Neri OTR/MATTHIAS

## 2021-03-23 NOTE — PROGRESS NOTES
Hydralazine give prn for . Visit Vitals  BP (!) 174/113   Pulse 92   Temp 98.4 °F (36.9 °C)   Resp 20   Ht 5' 4\" (1.626 m)   Wt 63.5 kg (140 lb)   SpO2 95%   BMI 24.03 kg/m²     MD notified.

## 2021-03-23 NOTE — PROGRESS NOTES
BSR received from [facility], [address] [place] [address]. Patient alert, resting in bed with frequent attempts to get out. Lap belt and bilateral soft wrist restraints in place, sitter at bedside. Unable to obtain orientation level, pt does not answer questions. Respirations even and unlabored on room air. No s/sx of distress noted. Safety measures in place, call light in reach.

## 2021-03-23 NOTE — PROGRESS NOTES
Patient remains in stable condition with respirations even/unlabored. No acute distress noted. No needs noted or voiced at this time. Safety measures in place. Patient on room air. Restraints noted. Call light remains within reach. Preparing to give report to oncoming shift.

## 2021-03-23 NOTE — PROGRESS NOTES
MSN, CM:  Attempted to speak with patient with no success. Called patient's son Pankaj Arboleda (Emergency contact) and all below information was gathered by him. Patient lives with her two sons \"Mackenzie and Unknown Lemon" and her daughter Danuta Urena" in own two-story house with 7 steps for entrance. Inside home has 15 steps leading to children's bedrooms. Patient reside on main level. Patient requires help with ADL's from [facility] or private paid caregiver. Patient requires the use of a walker for ambulation. Patient requires no home oxygen use or rehab in the past.  Patient has received [place] but son does not recall with what company. Coretta King confirms PCP is Dr. Zoila Martins and one of the children drive her to all appointments. Son does state patient goes to the [address] [place] [address] weekly for schizophrenia shots. Son states the patient's chatter her teeth as a side effect of the medication. PT and OT consulted and recommended SNF. Coretta King requested one close to the home. Will make referrals. Case Management will continue to follow for discharge needs.     Care Management Interventions  PCP Verified by CM: Yes(Dr. Zoila Martins)  Mode of Transport at Discharge: BLS([facility])  Transition of Care Consult (CM Consult): Discharge Planning  Physical Therapy Consult: Yes  Occupational Therapy Consult: Yes  Current Support Network: Own Home, Other(two sons and one daughter live in home)  Confirm Follow Up Transport: Family  Freedom of Choice List was Provided with Basic Dialogue that Supports the Patient's Individualized Plan of Care/Goals, Treatment Preferences and Shares the Quality Data Associated with the Providers?: Yes  Discharge Location  Discharge Placement: Skilled nursing facility

## 2021-03-23 NOTE — ACP (ADVANCE CARE PLANNING)
MSN, CM:  Spoke with Lata Ball (son) about healthcare decision r/t patient is unable to answer any cognitive questions at this time. Selena Party states he is primary decision make and his brother Benja Castaneda is secondary decision maker. Patient is full code according to Selena Party.

## 2021-03-23 NOTE — PROGRESS NOTES
Patient resting with safety sitter at bedside. No agitation noted at this time. Patient does try to sit up and swing legs out of bed but is easily redirected. Restraints removed at this time.

## 2021-03-23 NOTE — PROGRESS NOTES
LTG: Patient will tolerate least restrictive diet without overt signs or symptoms of airway compromise. STG: Patient will tolerate thin liquids without overt signs or symptoms of airway compromise. STG: Patient will tolerate ongoing po trials in efforts to advance diet. STG: Patient will participate in modified barium swallow study as clinically indicated. SPEECH LANGUAGE PATHOLOGY: DYSPHAGIA- Initial Assessment    NAME/AGE/GENDER: Elisa Kingston is a 67 y.o. female  DATE: 3/23/2021  PRIMARY DIAGNOSIS: Acute metabolic encephalopathy [X87.53]      ICD-10: Treatment Diagnosis: R13.12 Dysphagia, Oropharyngeal Phase    RECOMMENDATIONS   DIET: WITH 1:1 ASSSITANCE   Thin Liquids    MEDICATIONS: liquid meds or non oral     ASPIRATION PRECAUTIONS  · Slow rate of intake  · Small bites/sips  · Upright at 90 degrees during meal     COMPENSATORY STRATEGIES/MODIFICATIONS  · Small sips and bites  · 1:1 assistance  · Only when awake/alert  · Cues to swallow      RECOMMENDATIONS for CONTINUED SPEECH THERAPY:   YES: Anticipate need for ongoing speech therapy during this hospitalization and at next level of care. ASSESSMENT   Patient presents with significant oral dysphagia. Inconsistent oral acceptance and oral delay requiring cues. Oral delay significantly increases with puree. Although no overt s/sx airway compromise at bedside during evaluation, patient certainly at increased risk of aspiration due to oral deficits and altered mental status. Ensure following aspiration precautions and compensatory strategies listed above. Recommend liquids only/thin liquids with 1:1 assistance.        EDUCATION:  · Recommendations discussed with Patient and RN  CONTINUATION OF SKILLED SERVICES/MEDICAL NECESSITY:   Patient is expected to demonstrate progress in  swallow strength, swallow timeliness, swallow function, diet tolerance and swallow safety in order to  improve swallow safety, work toward diet advancement and decrease aspiration risk.  Patient continues to require skilled intervention due to dysphagia. REHABILITATION POTENTIAL FOR STATED GOALS: Good    PLAN    FREQUENCY/DURATION: Continue to follow patient 3 times a week for duration of hospital stay to address above goals. - Recommendations for next treatment session: Next treatment session will address diet tolerance and po trials    SUBJECTIVE   Upright in bed, restless in lap belt and wrist restraints. Oxygen Device: room air  Pain: Pain Scale 1: FLACC  Pain Intensity 1: 0    History of Present Injury/Illness: Ms. Estee Gates  has no past medical history on file. . She also  has no past surgical history on file. PRECAUTIONS/ALLERGIES: Patient has no known allergies. Problem List:  (Impairments causing functional limitations):  1. Oropharyngeal dysphagia     Previous Dysphagia: CNA reports pocketing yesterday   Diet Prior to Evaluation: NPO    Orientation:  name    Cognitive-Linguistic Screening:   Alertness  o Alert   Speech Production:   o dysarthric. minimal verbalizations   Expressive Language:  o Non-fluent speech; minimal verbalizations, spontaneous \"no no no\"; \"bye\"   Receptive Language:  o follows basic 1 step commands inconsistently with prompting   Cognition:   o Impaired; baseline dementia/schizophrenia. Family brought in due to increased AMS  Prior Level of Function: lives with son. Recommendations: Given results of screening, cognition impaired at baseline and appears to be exaccerbated by current acute illness    OBJECTIVE   Oral Motor: informal observation other than pt sticking out tongue with prompting  · Labial: Decreased seal  · Dentition: Edentulous  · Oral Hygiene: thrush  · Lingual: Decreased rate and Decreased strength   · ?swelling/tenderness right side of neck? (RN notified)    Dysphagia evaluation:   Baseline abnormal oral movements. Patient consumed trials of ice chips, thin liquids by tsp and straw, and puree.  Adequate acceptance by tsp. Inconsistent ability to drink via straw requiring increased time and verbal cueing. At times, timely consumption via straw, while other times pt just sealing lips with no attempts to siphon via straw. Consumed ~3oz total. No overt s/sx airway compromise with thin or puree. Increased oral holding and incoordinated lingual movement with puree. Tool Used: Dysphagia Outcome and Severity Scale (WILY)    Score Comments   Normal Diet  [] 7 With no strategies or extra time needed   Functional Swallow  [] 6 May have mild oral or pharyngeal delay   Mild Dysphagia  [] 5 Which may require one diet consistency restricted    Mild-Moderate Dysphagia  [] 4 With 1-2 diet consistencies restricted   Moderate Dysphagia  [] 3 With 2 or more diet consistencies restricted   Moderate-Severe Dysphagia  [] 2 With partial PO strategies (trials with ST only)   Severe Dysphagia  [] 1 With inability to tolerate any PO safely      Score:  Initial: 3 Most Recent: x (Date 03/23/21 )   Interpretation of Tool: The Dysphagia Outcome and Severity Scale (WILY) is a simple, easy-to-use, 7-point scale developed to systematically rate the functional severity of dysphagia based on objective assessment and make recommendations for diet level, independence level, and type of nutrition. Current Medications:   No current facility-administered medications on file prior to encounter. No current outpatient medications on file prior to encounter.         INTERDISCIPLINARY COLLABORATION: RN    After treatment position/precautions:  · Upright in bed  · Mitts in place  · RN notified   · Lap belt in place  · Sitter at bedside     Total Treatment Duration:   Time In: 6419  Time Out: [address] [facility],2 And 3 [address] Floors, [facility] MEDICO DEL [facility], [facility] DAILY [place], [id number] [place] [address]

## 2021-03-23 NOTE — PROGRESS NOTES
Patient resting in bed. Lap belt and bilateral wrist restraints in place and sitter at bedside. Unable to obtain orientation level as patient does not answer questions when asked. Respirations even and unlabored on room air. No s/sx of distress noted. Safety measures in place, call light in reach.  BSR given to Suzzane Mohs

## 2021-03-23 NOTE — PROGRESS NOTES
Notified Dr. Nela Sarmiento via perfect serve regarding patient's tachycardia. MD aware. No new orders placed at this time.

## 2021-03-23 NOTE — PROGRESS NOTES
HEATHER from [place], [address] [place] [address]. Patient in stable condition with resps even/unlabored. NAD noted. Patient on room air. Restraints in place, safety sitter noted, lab belt in place. Patient A/O x 1 at this time. Safety measures noted. Will continue to monitor per policy.

## 2021-03-23 NOTE — PROGRESS NOTES
Progress Note    Patient: Dav Rodriguez MRN: [id number]  SSN: [id number]    YOB: 1945  Age: 67 y.o. Sex: female      Admit Date: 3/22/2021    LOS: 1 day     Assessment and Plan:   67 y.o. female with medical h/o dementia, schizophrenia who presented to [facility] ER with report of several weeks progressive confusion and worsening agitation/confusion/ shuffling gait for past 3 days    # Acute metabolic encephalopathy  # UTI  - continue Rocephin  - follow for cultures - ngtd     # Dementia/ schizophrenia  - psych consult  - delirium precautions  - avoid sedatives     VTE ppx: lovenox    Subjective:   67 y.o. female with medical h/o dementia, schizophrenia who presented to [facility] ER with report of several weeks progressive confusion and worsening agitation/confusion/ shuffling gait for past 3 days. Patient is minimally conversant so history obtained from son at bedside. She lives with her son with other family helping at times. They are overwhelmed by her needs and are concerned for her safety due to her wandering behaviors. Patient seen and examined at bedside. This morning, alert, confused, unable to obtain any history. Objective:     Vitals:    03/23/21 0807 03/23/21 1005 03/23/21 1054 03/23/21 1200   BP:   (!) 144/85    Pulse: (!) 105 91 93 92   Resp:   20    Temp:   98.2 °F (36.8 °C)    SpO2:   92%    Weight:       Height:            Intake and Output:  Current Shift: 03/23 0701 - 03/23 1900  In: 120 [P.O.:120]  Out: -   Last three shifts: No intake/output data recorded.     ROS  Unable to obtain due to patients condition    Physical Exam:   General: Alert, confused, NAD  HEENT: NC/AT, EOM are intact  Neck: supple, no JVD  Cardiovascular: RRR, S1, S2, no murmurs  Respiratory: Lungs are clear, no wheezes or rales  Abdomen: Soft, NT, ND  Back: No CVA tenderness, no paraspinal tenderness  Extremities: LE without pedal edema, no erythema  Neuro: A&O, CN are intact, no focal deficits  Skin: no rash or ulcers    Lab/Data Review:  I have personally reviewed patients laboratory data showing  Recent Results (from the past 24 hour(s))   CULTURE, URINE    Collection Time: 03/22/21  5:27 PM    Specimen: Clean catch; Urine    URINE   Result Value Ref Range    Special Requests: NO SPECIAL REQUESTS      Culture result:        NO GROWTH AFTER SHORT PERIOD OF INCUBATION. FURTHER RESULTS TO FOLLOW AFTER OVERNIGHT INCUBATION. GLUCOSE, POC    Collection Time: 03/22/21  6:42 PM   Result Value Ref Range    Glucose (POC) 136 (H) 65 - 100 mg/dL    Performed by [address] [place] [address]    Collection Time: 03/23/21  4:49 AM   Result Value Ref Range    Sodium 147 (H) 136 - 145 mmol/L    Potassium 3.1 (L) 3.5 - 5.1 mmol/L    Chloride 114 (H) 98 - 107 mmol/L    CO2 24 21 - 32 mmol/L    Anion gap 9 7 - 16 mmol/L    Glucose 104 (H) 65 - 100 mg/dL    BUN 13 8 - 23 MG/DL    Creatinine 1.25 (H) 0.6 - 1.0 MG/DL    GFR est AA 54 (L) >60 ml/min/1.73m2    GFR est non-AA 44 (L) >60 ml/min/1.73m2    Calcium 9.6 8.3 - 10.4 MG/DL    Bilirubin, total 0.6 0.2 - 1.1 MG/DL    ALT (SGPT) 14 12 - 65 U/L    AST (SGOT) 19 15 - 37 U/L    Alk. phosphatase 64 50 - 136 U/L    Protein, total 6.7 6.3 - 8.2 g/dL    Albumin 3.8 3.2 - 4.6 g/dL    Globulin 2.9 2.3 - 3.5 g/dL    A-G Ratio 1.3 1.2 - 3.5     CBC WITH AUTOMATED DIFF    Collection Time: 03/23/21  4:49 AM   Result Value Ref Range    WBC 4.8 4.3 - 11.1 K/uL    RBC 3.85 (L) 4.05 - 5.2 M/uL    HGB 11.6 (L) 11.7 - 15.4 g/dL    HCT 36.4 35.8 - 46.3 %    MCV 94.5 79.6 - 97.8 FL    MCH 30.1 26.1 - 32.9 PG    MCHC 31.9 31.4 - 35.0 g/dL    RDW 14.1 11.9 - 14.6 %    PLATELET 944 150 - 950 K/uL    MPV 9.7 9.4 - 12.3 FL    ABSOLUTE NRBC 0.00 0.0 - 0.2 K/uL    DF AUTOMATED      NEUTROPHILS 68 43 - 78 %    LYMPHOCYTES 21 13 - 44 %    MONOCYTES 10 4.0 - 12.0 %    EOSINOPHILS 0 (L) 0.5 - 7.8 %    BASOPHILS 1 0.0 - 2.0 %    IMMATURE GRANULOCYTES 0 0.0 - 5.0 %    ABS.  NEUTROPHILS 3.2 1.7 - 8.2 K/UL    ABS. LYMPHOCYTES 1.0 0.5 - 4.6 K/UL    ABS. MONOCYTES 0.5 0.1 - 1.3 K/UL    ABS. EOSINOPHILS 0.0 0.0 - 0.8 K/UL    ABS. BASOPHILS 0.0 0.0 - 0.2 K/UL    ABS. IMM. GRANS. 0.0 0.0 - 0.5 K/UL   VITAMIN B12    Collection Time: 03/23/21  4:49 AM   Result Value Ref Range    Vitamin B12 697 193 - 986 pg/mL   AMMONIA    Collection Time: 03/23/21  9:29 AM   Result Value Ref Range    Ammonia 31 11 - 32 UMOL/L      All Micro Results     Procedure Component Value Units Date/Time    CULTURE, BLOOD [[id number]] Collected: 03/22/21 1355    Order Status: Completed Specimen: Blood Updated: 03/23/21 1108     Special Requests: --        RIGHT  Antecubital       Culture result: NO GROWTH AFTER 20 HOURS       CULTURE, BLOOD [[id number]] Collected: 03/22/21 1355    Order Status: Completed Specimen: Blood Updated: 03/23/21 1108     Special Requests: --        RIGHT  HAND       Culture result: NO GROWTH AFTER 20 HOURS       CULTURE, URINE [[id number]] Collected: 03/22/21 1727    Order Status: Completed Specimen: Urine from Clean catch Updated: 03/23/21 0913     Special Requests: NO SPECIAL REQUESTS        Culture result:       NO GROWTH AFTER SHORT PERIOD OF INCUBATION. FURTHER RESULTS TO FOLLOW AFTER OVERNIGHT INCUBATION. Image:  I have personally reviewed patients imaging showing  CT HEAD WO CONT   Final Result   Normal CT of the brain for age done without contrast.                  Hospital problems     Active Problems:    Acute metabolic encephalopathy (6/18/0109)        I have reviewed, updated, and verified this note's content and spent 38 minutes of my 42 minutes visit performing counseling and coordination of care regarding medical management.        Signed By: Fatimah Canales MD     March 23, 2021

## 2021-03-24 PROBLEM — F20.9 SCHIZOPHRENIA (HCC): Status: ACTIVE | Noted: 2021-03-24

## 2021-03-24 PROBLEM — F03.90 DEMENTIA (HCC): Status: ACTIVE | Noted: 2021-03-24

## 2021-03-24 LAB
ANION GAP SERPL CALC-SCNC: 7 MMOL/L (ref 7–16)
BASOPHILS # BLD: 0 K/UL (ref 0–0.2)
BASOPHILS NFR BLD: 1 % (ref 0–2)
BUN SERPL-MCNC: 11 MG/DL (ref 8–23)
CALCIUM SERPL-MCNC: 9.6 MG/DL (ref 8.3–10.4)
CHLORIDE SERPL-SCNC: 110 MMOL/L (ref 98–107)
CO2 SERPL-SCNC: 26 MMOL/L (ref 21–32)
CREAT SERPL-MCNC: 1.31 MG/DL (ref 0.6–1)
DIFFERENTIAL METHOD BLD: ABNORMAL
EOSINOPHIL # BLD: 0 K/UL (ref 0–0.8)
EOSINOPHIL NFR BLD: 0 % (ref 0.5–7.8)
ERYTHROCYTE [DISTWIDTH] IN BLOOD BY AUTOMATED COUNT: 13.8 % (ref 11.9–14.6)
GLUCOSE SERPL-MCNC: 126 MG/DL (ref 65–100)
HCT VFR BLD AUTO: 37.7 % (ref 35.8–46.3)
HGB BLD-MCNC: 12.2 G/DL (ref 11.7–15.4)
IMM GRANULOCYTES # BLD AUTO: 0 K/UL (ref 0–0.5)
IMM GRANULOCYTES NFR BLD AUTO: 0 % (ref 0–5)
LYMPHOCYTES # BLD: 1.3 K/UL (ref 0.5–4.6)
LYMPHOCYTES NFR BLD: 27 % (ref 13–44)
MCH RBC QN AUTO: 29.8 PG (ref 26.1–32.9)
MCHC RBC AUTO-ENTMCNC: 32.4 G/DL (ref 31.4–35)
MCV RBC AUTO: 92.2 FL (ref 79.6–97.8)
MM INDURATION POC: 0 MM (ref 0–5)
MONOCYTES # BLD: 0.4 K/UL (ref 0.1–1.3)
MONOCYTES NFR BLD: 8 % (ref 4–12)
NEUTS SEG # BLD: 3 K/UL (ref 1.7–8.2)
NEUTS SEG NFR BLD: 63 % (ref 43–78)
NRBC # BLD: 0 K/UL (ref 0–0.2)
PLATELET # BLD AUTO: 176 K/UL (ref 150–450)
PMV BLD AUTO: 9.9 FL (ref 9.4–12.3)
POTASSIUM SERPL-SCNC: 2.9 MMOL/L (ref 3.5–5.1)
PPD POC: NEGATIVE NEGATIVE
RBC # BLD AUTO: 4.09 M/UL (ref 4.05–5.2)
SERVICE CMNT-IMP: NORMAL
SODIUM SERPL-SCNC: 143 MMOL/L (ref 136–145)
WBC # BLD AUTO: 4.8 K/UL (ref 4.3–11.1)
WET PREP GENITAL: NORMAL
WET PREP GENITAL: NORMAL

## 2021-03-24 PROCEDURE — 36415 COLL VENOUS BLD VENIPUNCTURE: CPT

## 2021-03-24 PROCEDURE — 74011250636 HC RX REV CODE- 250/636: Performed by: INTERNAL MEDICINE

## 2021-03-24 PROCEDURE — 74011000258 HC RX REV CODE- 258: Performed by: INTERNAL MEDICINE

## 2021-03-24 PROCEDURE — 97116 GAIT TRAINING THERAPY: CPT

## 2021-03-24 PROCEDURE — 97530 THERAPEUTIC ACTIVITIES: CPT

## 2021-03-24 PROCEDURE — 87210 SMEAR WET MOUNT SALINE/INK: CPT

## 2021-03-24 PROCEDURE — 51798 US URINE CAPACITY MEASURE: CPT

## 2021-03-24 PROCEDURE — 85025 COMPLETE CBC W/AUTO DIFF WBC: CPT

## 2021-03-24 PROCEDURE — 65660000000 HC RM CCU STEPDOWN

## 2021-03-24 PROCEDURE — 76450000000

## 2021-03-24 PROCEDURE — 80048 BASIC METABOLIC PNL TOTAL CA: CPT

## 2021-03-24 PROCEDURE — 99233 SBSQ HOSP IP/OBS HIGH 50: CPT | Performed by: OBSTETRICS & GYNECOLOGY

## 2021-03-24 PROCEDURE — 92526 ORAL FUNCTION THERAPY: CPT

## 2021-03-24 PROCEDURE — 87591 N.GONORRHOEAE DNA AMP PROB: CPT

## 2021-03-24 PROCEDURE — 2709999900 HC NON-CHARGEABLE SUPPLY

## 2021-03-24 RX ADMIN — ENOXAPARIN SODIUM 40 MG: 40 INJECTION SUBCUTANEOUS at 09:00

## 2021-03-24 RX ADMIN — Medication 10 ML: at 14:36

## 2021-03-24 RX ADMIN — DEXTROSE MONOHYDRATE 75 ML/HR: 5 INJECTION, SOLUTION INTRAVENOUS at 14:48

## 2021-03-24 RX ADMIN — Medication 10 ML: at 06:00

## 2021-03-24 RX ADMIN — CEFTRIAXONE SODIUM 1 G: 1 INJECTION, POWDER, FOR SOLUTION INTRAMUSCULAR; INTRAVENOUS at 14:36

## 2021-03-24 RX ADMIN — Medication 10 ML: at 21:45

## 2021-03-24 NOTE — PROGRESS NOTES
MSN, CM:  Patient's discharge plan at this time is to SNF when medically stable. Patient remains confused but Joie Lam (emergency contact), patient's son agrees with this discharge plan. Case Management will continue to follow.

## 2021-03-24 NOTE — PROGRESS NOTES
SBAR from [facility], [address] [facility]. Patient in stable condition with resps even/unlabored. NAD noted. Patient continues on room air. Restraints noted. Safety measures noted. Will continue to monitor per policy.

## 2021-03-24 NOTE — PROGRESS NOTES
LTG: Patient will tolerate least restrictive diet without overt signs or symptoms of airway compromise. STG: Patient will tolerate thin liquids without overt signs or symptoms of airway compromise. STG: Patient will tolerate ongoing po trials in efforts to advance diet. STG: Patient will participate in modified barium swallow study as clinically indicated. SPEECH LANGUAGE PATHOLOGY: DYSPHAGIA- Daily Note 1    NAME/AGE/GENDER: Mildred Rivera is a 67 y.o. female  DATE: 3/24/2021  PRIMARY DIAGNOSIS: Acute metabolic encephalopathy [N68.93]      ICD-10: Treatment Diagnosis: R13.12 Dysphagia, Oropharyngeal Phase    RECOMMENDATIONS   DIET: WITH 1:1 ASSSITANCE   Thin Liquids by spoon    MEDICATIONS: Non-oral     ASPIRATION PRECAUTIONS  · Slow rate of intake  · Small bites/sips  · Upright at 90 degrees during meal     COMPENSATORY STRATEGIES/MODIFICATIONS  · Liquids by spoon when awake/alert and accepting  · 1:1 assistance  · Cues to swallow      RECOMMENDATIONS for CONTINUED SPEECH THERAPY:   YES: Anticipate need for ongoing speech therapy during this hospitalization and at next level of care. ASSESSMENT   Significant oral dysphagia characterized by impaired acceptance, control, and delay. Mild Anterior loss of thin by tsp, unable to drink via straw, and  Significantly prolonged holding with tsps of puree. No overt s/sx airway compromise, however minimal amount consumed and pt certainly remains at increased risk given mental status and oral deficits. Recommend liquids only/thin liquids via spoon with 1:1 assistance when alert, upright, and actively participating. Patient is not going to be able to meet nutritionally needs orally with this recommendation. Alternate means of nutrition not felt to be appropriate in this situation given dementia and pt's current need for lap belt/wrist restraints/sitter. Discussed with RN.  If oral dysphagia/poor intake persists, may want to Consider palliative care consult to help guide plan of care. *addendum: spoke with patient's son this afternoon. He reports poor intake at home and significant decline since 3/18. Reports patient would take a bite or 2 of things like mashed potatoes. Discussed SLP role,recommendations, aspiration risk, and plan for ongoing trials. Son verbalized understanding. EDUCATION:  · Recommendations discussed with Patient and RN  CONTINUATION OF SKILLED SERVICES/MEDICAL NECESSITY:   Patient is expected to demonstrate progress in  swallow strength, swallow timeliness, swallow function, diet tolerance and swallow safety in order to  improve swallow safety, work toward diet advancement and decrease aspiration risk.  Patient continues to require skilled intervention due to dysphagia. REHABILITATION POTENTIAL FOR STATED GOALS: Good    PLAN    FREQUENCY/DURATION: Continue to follow patient 3 times a week for duration of hospital stay to address above goals. - Recommendations for next treatment session: Next treatment session will address diet tolerance and po trials    SUBJECTIVE   Upright in bed, restless. Continues to require lap belt and wrist restraints. Sitter at bedside. \"take this off\" (referring to IV). Otherwise, non verbal.     Oxygen Device: room air  Pain: Pain Scale 1: FLACC  Pain Intensity 1: 0    History of Present Injury/Illness: Ms. Rogelio Tran  has no past medical history on file. . She also  has no past surgical history on file. PRECAUTIONS/ALLERGIES: Patient has no known allergies. Problem List:  (Impairments causing functional limitations):  1. Oropharyngeal dysphagia     Orientation:  Did not answer     OBJECTIVE     Dysphagia treatment-po trials/tolerance  Oral acceptance even more impaired to than yesterday. Accepted ice chips, thin liquid by tsp, and puree by tsp. Adequate acceptance, containment, and clearance with small ice chips.  only minimal amount of thin liquid by tsp consumed due to impaired acceptance/containment resulting in mild anterior loss of bolus. Patient unable to drink via straw. Accepted puree by tsp, however prolonged oral holding despite prompting. Presented thin by tsp which eventually prompted swallow. Tool Used: Dysphagia Outcome and Severity Scale (WILY)    Score Comments   Normal Diet  [] 7 With no strategies or extra time needed   Functional Swallow  [] 6 May have mild oral or pharyngeal delay   Mild Dysphagia  [] 5 Which may require one diet consistency restricted    Mild-Moderate Dysphagia  [] 4 With 1-2 diet consistencies restricted   Moderate Dysphagia  [] 3 With 2 or more diet consistencies restricted   Moderate-Severe Dysphagia  [] 2 With partial PO strategies (trials with ST only)   Severe Dysphagia  [] 1 With inability to tolerate any PO safely      Score:  Initial: 3 Most Recent: 3 (Date 03/24/21 )   Interpretation of Tool: The Dysphagia Outcome and Severity Scale (WILY) is a simple, easy-to-use, 7-point scale developed to systematically rate the functional severity of dysphagia based on objective assessment and make recommendations for diet level, independence level, and type of nutrition.          INTERDISCIPLINARY COLLABORATION: RN    After treatment position/precautions:  · Upright in bed  · RN notified   · Lap belt in place, wrist restraints   · Sitter at bedside     Total Treatment Duration:   Time In: 1033  Time Out: [address] [address] [facility], [place], operated by [facility]

## 2021-03-24 NOTE — PROGRESS NOTES
Patient remains in stable condition with respirations even/unlabored. No acute distress noted. No needs noted or voiced at this time. Safety measures in place. Patient on room air. Sitter at bedside. No restraints in use. Call light remains within reach. Preparing to give report to oncoming shift.

## 2021-03-24 NOTE — PROGRESS NOTES
Patient sitting in chair and says \"Come on. \" over and over again. Patient able to ambulate in hallway with CNA and walks half the unit. Patient then repositioned in the bed and is being fed by assistant at this time. Son, Deirdre Cerrato, at bedside attempting to encourage PO intake.

## 2021-03-24 NOTE — PROGRESS NOTES
Progress Note    Patient: Alba Goff MRN: [id number]  SSN: [id number]    YOB: 1945  Age: 67 y.o. Sex: female      Admit Date: 3/22/2021    LOS: 2 days     Assessment and Plan:   67 y.o. female with medical h/o dementia, schizophrenia who presented to [facility] ER with report of several weeks progressive confusion and worsening agitation/confusion/ shuffling gait for past 3 days    # Acute metabolic encephalopathy  # UTI  - continue Rocephin  - follow for cultures - ngtd     # Dementia/ schizophrenia  - psych consulted  - delirium precautions  - avoid sedatives     VTE ppx: lovenox    Subjective:   67 y.o. female with medical h/o dementia, schizophrenia who presented to [facility] ER with report of several weeks progressive confusion and worsening agitation/confusion/ shuffling gait for past 3 days. Patient is minimally conversant so history obtained from son at bedside. She lives with her son with other family helping at times. They are overwhelmed by her needs and are concerned for her safety due to her wandering behaviors. Patient seen and examined at bedside. This morning, alert, confused, unable to obtain any history.      Objective:     Vitals:    03/24/21 0400 03/24/21 0744 03/24/21 0800 03/24/21 1124   BP:  110/78  (!) 159/100   Pulse: 94 84 (!) 106 88   Resp:  20  18   Temp:  97.7 °F (36.5 °C)  97.8 °F (36.6 °C)   SpO2:  93%  98%   Weight:       Height:            Intake and Output:  Current Shift: 03/24 0701 - 03/24 1900  In: 1340 [I.V.:1340]  Out: -   Last three shifts: 03/22 1901 - 03/24 0700  In: 4522 [P.O.:180; I.V.:1509]  Out: 200 [Urine:200]    ROS  Unable to obtain due to patients condition    Physical Exam:   General: Alert, confused, NAD  HEENT: NC/AT, EOM are intact  Neck: supple, no JVD  Cardiovascular: RRR, S1, S2, no murmurs  Respiratory: Lungs are clear, no wheezes or rales  Abdomen: Soft, NT, ND  Back: No CVA tenderness, no paraspinal tenderness  Extremities: LE without pedal edema, no erythema  Neuro: A&O, CN are intact, no focal deficits  Skin: no rash or ulcers    Lab/Data Review:  I have personally reviewed patients laboratory data showing  Recent Results (from the past 24 hour(s))   PLEASE READ & DOCUMENT PPD TEST IN 24 HRS    Collection Time: 03/23/21  4:38 PM   Result Value Ref Range    PPD Negative Negative    mm Induration 0 0 - 5 mm   GLUCOSE, POC    Collection Time: 03/23/21  6:17 PM   Result Value Ref Range    Glucose (POC) 134 (H) 65 - 100 mg/dL    Performed by Sukhdeep    CBC WITH AUTOMATED DIFF    Collection Time: 03/24/21  7:24 AM   Result Value Ref Range    WBC 4.8 4.3 - 11.1 K/uL    RBC 4.09 4.05 - 5.2 M/uL    HGB 12.2 11.7 - 15.4 g/dL    HCT 37.7 35.8 - 46.3 %    MCV 92.2 79.6 - 97.8 FL    MCH 29.8 26.1 - 32.9 PG    MCHC 32.4 31.4 - 35.0 g/dL    RDW 13.8 11.9 - 14.6 %    PLATELET 951 282 - 379 K/uL    MPV 9.9 9.4 - 12.3 FL    ABSOLUTE NRBC 0.00 0.0 - 0.2 K/uL    DF AUTOMATED      NEUTROPHILS 63 43 - 78 %    LYMPHOCYTES 27 13 - 44 %    MONOCYTES 8 4.0 - 12.0 %    EOSINOPHILS 0 (L) 0.5 - 7.8 %    BASOPHILS 1 0.0 - 2.0 %    IMMATURE GRANULOCYTES 0 0.0 - 5.0 %    ABS. NEUTROPHILS 3.0 1.7 - 8.2 K/UL    ABS. LYMPHOCYTES 1.3 0.5 - 4.6 K/UL    ABS. MONOCYTES 0.4 0.1 - 1.3 K/UL    ABS. EOSINOPHILS 0.0 0.0 - 0.8 K/UL    ABS. BASOPHILS 0.0 0.0 - 0.2 K/UL    ABS. IMM.  GRANS. 0.0 0.0 - 0.5 K/UL   METABOLIC PANEL, BASIC    Collection Time: 03/24/21  7:24 AM   Result Value Ref Range    Sodium 143 136 - 145 mmol/L    Potassium 2.9 (L) 3.5 - 5.1 mmol/L    Chloride 110 (H) 98 - 107 mmol/L    CO2 26 21 - 32 mmol/L    Anion gap 7 7 - 16 mmol/L    Glucose 126 (H) 65 - 100 mg/dL    BUN 11 8 - 23 MG/DL    Creatinine 1.31 (H) 0.6 - 1.0 MG/DL    GFR est AA 51 (L) >60 ml/min/1.73m2    GFR est non-AA 42 (L) >60 ml/min/1.73m2    Calcium 9.6 8.3 - 10.4 MG/DL      All Micro Results     Procedure Component Value Units Date/Time    CULTURE, BLOOD [[id number]] Collected: 03/22/21 1355    Order Status: Completed Specimen: Blood Updated: 03/24/21 1055     Special Requests: --        RIGHT  Antecubital       Culture result: NO GROWTH 2 DAYS       CULTURE, BLOOD [[id number]] Collected: 03/22/21 1355    Order Status: Completed Specimen: Blood Updated: 03/24/21 1055     Special Requests: --        RIGHT  HAND       Culture result: NO GROWTH 2 DAYS       CULTURE, URINE [[id number]] Collected: 03/22/21 1727    Order Status: Completed Specimen: Urine from Clean catch Updated: 03/24/21 0813     Special Requests: NO SPECIAL REQUESTS        Culture result:       <10,000 COLONIES/mL MIXED SKIN KO ISOLATED                 Image:  I have personally reviewed patients imaging showing  CT HEAD WO CONT   Final Result   Normal CT of the brain for age done without contrast.                  Hospital problems     Active Problems:    Acute metabolic encephalopathy (4/03/0257)        I have reviewed, updated, and verified this note's content and spent 38 minutes of my 42 minutes visit performing counseling and coordination of care regarding medical management.        Signed By: Eloy Whitaker MD     March 24, 2021

## 2021-03-24 NOTE — PROGRESS NOTES
Comprehensive Nutrition Assessment    Type and Reason for Visit: Initial, Positive nutrition screen  Best Practice Alert for Malnutrition Screening Tool: Recently Lost Weight Without Trying: Unsure(Altered Mental Status), If Yes, How Much Weight Loss: Unsure(Altered Mental Status), Eating Poorly Due to Decreased Appetite: Yes    Nutrition Recommendations/Plan:    Diet per SLP--currently CLQ diet (liquids do not have to be clear) by spoon only   Change Ensure High Protein to Ensure Enlive TID   Continue 1:1 assistance with meals as recommended by SLP     Malnutrition Assessment:  Malnutrition Status: Severe malnutrition  Context: Chronic illness  Findings of clinical characteristics of malnutrition:   Energy Intake:  Mild decrease in energy intake (specify)(poor po intake reported per son)  Weight Loss:  7.0 - Greater than 7.5% over 3 months(133lbs at [facility] visit; 13% in 2 months)     Body Fat Loss:  7 - Severe body fat loss, Triceps, Orbital   Muscle Mass Loss:  1 - Mild muscle mass loss, Calf (gastrocnemius), Temples (temporalis), Thigh (quadraceps), Clavicles (pectoralis &deltoids)  Fluid Accumulation:  Unable to assess,     Strength:  Not performed     Nutrition Assessment:   Nutrition History: Unable to assess. Per SLP note on 3/24, son reports pt has had poor po intake since 3/18. Pt is taking a bite or 2 of foods such as mashed potatoes. Noted ED visit at [facility] on 1/2/21 due to cognitive decline. Per ED note, pt's son Shane Ibarra, stated pt was fine until December 26th and pt was unable to walk well, not eating, and unable to carry on conversations with family members. Noted pt has had a significant wt loss since office visit at [facility] on 1/28/21. .      Nutrition Background: Pt presents due to several weeks of progressive confusion and worsening agitation. PMH notable for dementia and schizophrenia. Daily Update:  Pt seen with sitter at bedside.  Pt doesn't answer questions during visit due to confusion and noted to be in soft mitt restraints and lap belt. Nataliya and RN report pt had a bite of jello, 2 spoonfuls of [place] ice, and 2 spoonfuls of tea. Nataliya reports pt turns her head when offered Ensure High Protein shake. Pt placed on nutritionally inadequate diet on 3/23 per SLP and later instructed to continue liquids only diet via spoon with anticipation of pt not being able to meet estimated nutrition needs on current diet 3/24. Per note, pt had mild anterior loss of thin by tsp, unable to drink via straw, and significantly prolonged holding with tsps of puree. SLP recommending palliative care consult to guide plan of care. Noted MD has consulted palliative care for goals of care discussion. If pt receives PEG, anticipate pt to be high-risk of self-discontinuation of PEG due to current mentation and reported increased agitation PTA. Abdominal Status (last documented): Intact abdomen with Active  bowel sounds. Pertinent Medications: 20meq KCl (po) 3/23  IVF: D5 @ 75ml/hr  Pertinent Labs:    Lab Results   Component Value Date/Time     03/24/2021 07:24 AM    K 2.9 (L) 03/24/2021 07:24 AM     (H) 03/24/2021 07:24 AM    CO2 26 03/24/2021 07:24 AM    AGAP 7 03/24/2021 07:24 AM     (H) 03/24/2021 07:24 AM    BUN 11 03/24/2021 07:24 AM    CREA 1.31 (H) 03/24/2021 07:24 AM    GFRAA 51 (L) 03/24/2021 07:24 AM    GFRNA 42 (L) 03/24/2021 07:24 AM    CA 9.6 03/24/2021 07:24 AM     Potassium of 4.3 noted at admission. Current Nutrition Therapies:  DIET NUTRITIONAL SUPPLEMENTS All Meals;  Ensure High Protein  DIET CLEAR LIQUID    Current Intake:   Average Meal Intake: 0%(CLQ diet and not drinking ONS) Average Supplement Intake: 0%      Anthropometric Measures:  Height: 5' 4\" (162.6 cm)  Current Body Wt: 52.4 kg (115 lb 8.3 oz)(3/24), Weight source: Standing scale  BMI: 19.8, Underweight (BMI less than 22) age over 72  Admission Body Weight: 115 lb 8.3 oz(standing scale; 3/24)  Ideal Body Weight (lbs) (Calculated): 120 lbs (55 kg), 96.3 %  Usual Body Wt: 60.4 kg (133 lb 2.5 oz)(1/28/21; [address] [place] [address]), Percent weight change: -13.2          Edema: Facial: 1+ (to right side of neck) (3/24/2021  7:34 AM)     Estimated Daily Nutrient Needs:  Energy (kcal/day): 4835-0240 (Kcal/kg(25-30), Weight Used: Current(52.4kg))  Protein (g/day): 52-63(1-1.2gm/kg) Weight Used: (Current(52.4kg))  Fluid (ml/day):   (1 ml/kcal)    Nutrition Diagnosis:   · Severe malnutrition, In context of chronic illness related to cognitive or neurological impairment, inadequate protein-energy intake as evidenced by (criteria provided in malnutrition assessment above)    · Inadequate oral intake related to cognitive or neurological impairment, swallowing difficulty as evidenced by intake 0-25%(current diet liquids by spoon only)    Nutrition Interventions:   Food and/or Nutrient Delivery: Modify oral nutrition supplement(Diet advancement as appropriate per SLP)     Coordination of Nutrition Care: Continue to monitor while inpatient  Plan of Care [facility] and Lamberto Huerta, THERESA via Tanesha    Goals: Active Goal: Meet >75% estimated nutrition needs within 5 days    Nutrition Monitoring and Evaluation:      Food/Nutrient Intake Outcomes: Diet advancement/tolerance, Food and nutrient intake, Supplement intake       Discharge Planning:     Too soon to determine    Electronically signed by Kvng Flood MS, RDN, LD 3/24/2021 at 3:35 PM  Contact: [phone]

## 2021-03-24 NOTE — PROGRESS NOTES
Patient seems more calm with intermittent confusion. Sitter at bedside. Patient is able to follow directions at times. Restraints removed. Patient ambulated in hallway with PT/OT assistance. Patient placed in chair with lap belt. Restraints remain off. 1:1 safety sitter at bedside.

## 2021-03-24 NOTE — PROGRESS NOTES
ACUTE OT GOALS:  (Developed with and agreed upon by patient and/or caregiver.)    1. Patient will bathe and dress total body with moderate assistance and adaptive device as needed. 2. Patient will toilet with maximal assistance and adaptive device as needed. 3. Patient will tolerate 30 minutes of OT treatment with up to 2 rest breaks to increase activity tolerance for ADLs. 4. Patient will complete functional mobility for ADLs with supervision and adaptive device as needed. 5. Patient will demonstrate improved cognition for ADLs by completing functional tasks with up to moderate cueing from therapist.     Timeframe: 7 visits  OCCUPATIONAL THERAPY: Daily Note OT Treatment Day # 2    Stefania Tapia is a 67 y.o. female   PRIMARY DIAGNOSIS: <principal problem not specified>  Acute metabolic encephalopathy [K74.45]       [place] / Plan[place] ONLY / Product Type: Medicare /   ASSESSMENT:     REHAB RECOMMENDATIONS: CURRENT LEVEL OF FUNCTION:  (Most Recently Demonstrated)   Recommendation to date pending progress:  Setting:   Short-term Rehab  Equipment:    To Be Determined Bathing:   Not tested  Dressing:   Not tested  Feeding/Grooming:   Not tested  Toileting:   Not tested  Functional Mobility:   Minimal Assistance x 2     ASSESSMENT:  Ms. Nereida Escudero is doing fair today. Somewhat verbal at times. Pt is requiring min assist x2 for mobility in hallway. Pt demonstrates fair (occasional) balance during mobility. Pt left up in chair with lap belt in place and sitter in room. Pt making minimal progress at this time. Will continue to benefit from skilled OT during stay.      SUBJECTIVE:   Ms. Nereida Escudero states, \"Im okay\"    SOCIAL HISTORY/LIVING ENVIRONMENT:   Home Environment: Private residence  One/Two Story Residence: Two story  Living Alone: No  Support Systems: Child(segundo)    OBJECTIVE:     PAIN: VITAL SIGNS: LINES/DRAINS:   Pre Treatment: Pain Screen  Pain Scale 1: FLACC  Pain Intensity 1: 0  Post Treatment: 0   IV  O2 Device: Room air     ACTIVITIES OF DAILY LIVING: I Mod I S SBA CGA Min Mod Max Total NT Comments   BASIC ADLs:              Bathing/ Showering [] [] [] [] [] [] [] [] [] [x]    Toileting [] [] [] [] [] [] [] [] [] [x]    Dressing [] [] [] [] [] [] [] [] [] [x]    Feeding [] [] [] [] [] [] [] [] [] [x]    Grooming [] [] [] [] [] [] [] [] [] [x]    Personal Device Care [] [] [] [] [] [] [] [] [] [x]    Functional Mobility [] [] [] [] [] [x] [] [] [] [] x2   I=Independent, Mod I=Modified Independent, S=Supervision, SBA=Standby Assistance, CGA=Contact Guard Assistance,   Min=Minimal Assistance, Mod=Moderate Assistance, Max=Maximal Assistance, Total=Total Assistance, NT=Not Tested    MOBILITY: I Mod I S SBA CGA Min Mod Max Total  NT x2 Comments:   Supine to sit [] [] [] [] [] [x] [x] [] [] [] []    Sit to supine [] [] [] [] [] [] [] [] [] [x] []    Sit to stand [] [] [] [] [] [x] [] [] [] [] [x]    Bed to chair [] [] [] [] [] [x] [] [] [] [] [x]    I=Independent, Mod I=Modified Independent, S=Supervision, SBA=Standby Assistance, CGA=Contact Guard Assistance,   Min=Minimal Assistance, Mod=Moderate Assistance, Max=Maximal Assistance, Total=Total Assistance, NT=Not Tested    BALANCE: Good Fair+ Fair Fair- Poor NT Comments   Sitting Static [] [] [x] [] [] []    Sitting Dynamic [] [] [x] [] [] []              Standing Static [] [] [] [x] [] []    Standing Dynamic [] [] [] [x] [] []      PLAN:   FREQUENCY/DURATION: OT Plan of Care: 3 times/week for duration of hospital stay or until stated goals are met, whichever comes first.    TREATMENT:   TREATMENT:   ( $$ Therapeutic Activity: 8-22 mins   )  Therapeutic Activity (13 Minutes): Therapeutic activity included Supine to Sit, Ambulation on level ground and Standing balance to improve functional Mobility, Strength and Activity tolerance.     TREATMENT GRID:  N/A    AFTER TREATMENT POSITION/PRECAUTIONS:  Chair, Needs within reach and mitts and lap cordell    INTERDISCIPLINARY COLLABORATION:  RN/PCT, PT/PTA and OT/HUERTAS    TOTAL TREATMENT DURATION:  OT Patient Time In/Time Out  Time In: 1142  Time Out: 1155    Corinne Cowan

## 2021-03-24 NOTE — PROGRESS NOTES
Dr. Maxi Yoder at bedside to swab patient. Cultures collected and labeled at the bedside, sent to lab.

## 2021-03-24 NOTE — PROGRESS NOTES
SBAR received from Kari Cortez, [address] [place] [address]. Patient alert, resting quietly in bed. Altered mental status and unable to obtain orientation status. Patient not answering questions. Respirations even and unlabored on room air. D5 infusing to left hand IV without complications. No s/sx of distress. Lap belt and soft wrist restraints in place for pt safety. Bed low and locked.

## 2021-03-24 NOTE — CONSULTS
Gynecology Consult    Name: [facility] MRN: [id number] SSN: [id number]    YOB: 1945  Age: 67 y.o. Sex: female       Subjective:      Chief complaint:  Yellow vaginal discharge per nursing    Dominik Leo is a 67 y.o.  female with a history of yellow vaginal discharge when diaper changed. She is currently admitted by internal medicine for altered mental status and UTI. She does not communicate and history obtained from H&P and nurse    No past medical history on file. No past surgical history on file. OB History    No obstetric history on file.        No Known Allergies  Current Facility-Administered Medications   Medication Dose Route Frequency Provider Last Rate Last Admin    dextrose 5% infusion  75 mL/hr IntraVENous CONTINUOUS Jeanette Mijares MD 75 mL/hr at 03/24/21 1448 75 mL/hr at 03/24/21 1448    sodium chloride (NS) flush 5-40 mL  5-40 mL IntraVENous Q8H Gina Gross, DO   10 mL at 03/24/21 1436    sodium chloride (NS) flush 5-40 mL  5-40 mL IntraVENous PRN Desire Fogo C, DO        acetaminophen (TYLENOL) tablet 650 mg  650 mg Oral Q6H PRN Desire Johnsono C, DO        Or    acetaminophen (TYLENOL) suppository 650 mg  650 mg Rectal Q6H PRN Gina Gross, DO        polyethylene glycol (MIRALAX) packet 17 g  17 g Oral DAILY PRN Aleyda Plata DO        promethazine (PHENERGAN) tablet 12.5 mg  12.5 mg Oral Q6H PRN Desire Johnsono C, DO        Or    ondansetron [facility]) injection 4 mg  4 mg IntraVENous Q6H PRN Gina Gross, DO        enoxaparin (LOVENOX) injection 40 mg  40 mg SubCUTAneous DAILY Gina Gross, DO   40 mg at 03/24/21 0900    cefTRIAXone (ROCEPHIN) 1 g in 0.9% sodium chloride (MBP/ADV) 50 mL MBP  1 g IntraVENous Q24H Gina Gross,  mL/hr at 03/24/21 1436 1 g at 03/24/21 1436    hydrALAZINE (APRESOLINE) 20 mg/mL injection 20 mg  20 mg IntraVENous Q6H PRN Desire VALLE DO   20 mg at 03/23/21 1530       No family history on file. Social History     Socioeconomic History    Marital status: SINGLE     Spouse name: Not on file    Number of children: Not on file    Years of education: Not on file    Highest education level: Not on file   Occupational History    Not on file   Social Needs    Financial resource strain: Not on file    Food insecurity     Worry: Not on file     Inability: Not on file    Transportation needs     Medical: Not on file     Non-medical: Not on file   Tobacco Use    Smoking status: Not on file   Substance and Sexual Activity    Alcohol use: Not on file    Drug use: Not on file    Sexual activity: Not on file   Lifestyle    Physical activity     Days per week: Not on file     Minutes per session: Not on file    Stress: Not on file   Relationships    Social connections     Talks on phone: Not on file     Gets together: Not on file     Attends [employer] service: Not on file     Active member of club or organization: Not on file     Attends meetings of clubs or organizations: Not on file     Relationship status: Not on file    Intimate partner violence     Fear of current or ex partner: Not on file     Emotionally abused: Not on file     Physically abused: Not on file     Forced sexual activity: Not on file   Other Topics Concern    Not on file   Social History Narrative    Not on file       Review of Systems:  Review of systems not obtained due to patient factors. Objective:     Vitals:    03/24/21 1417 03/24/21 1453 03/24/21 1533 03/24/21 1600   BP:   (!) 147/94    Pulse:   87 (!) 107   Resp:   20    Temp:   98.1 °F (36.7 °C)    SpO2:   98%    Weight: 115 lb 9.6 oz (52.4 kg)      Height:  5' 4\" (1.626 m)       Consent had been obtained for exam from medical decision maker    External genitalia WNL with significant vulvo-vaginal atrophy. No discharge visible. Wet prep and Aptima swab done vaginally. No evidence of discharge on swab.   Very narrow vagina therefore no bimanual exam done and with no visible discharge risk of discomfort outweighs benefit    Wet Prep negative    Assessment:     Vaginal discharge earlier none now will follow up on Aptima Swab but doubt infection. Atrophy can cause some colored discharge that does not need to be treated.   Plan:     Please call if any questions or concerns

## 2021-03-24 NOTE — PROGRESS NOTES
Patient resting, alert in bed. Altered mental status. Respirations even and unlabored on room air. D5 infusing to left hand IV at 75 mL/hr without complications. No s/sx of distress. Staff have remained at bedside throughout shift. Lap belt in place and soft wrist restraints in place for patient safety. Bed low and locked. Call light remains within reach.      Preparing to give report to oncoming RN

## 2021-03-24 NOTE — PROGRESS NOTES
ACUTE PHYSICAL THERAPY GOALS:  (Developed with and agreed upon by patient and/or caregiver. )  LTG:  (1.)Ms. Ganesh Mijares will move from supine to sit and sit to supine, scoot up and down and roll side to side in bed INDEPENDENTLY with bed flat within 7 treatment day(s). (2.)Ms. Ganesh Mijares will transfer from bed to chair and chair to bed with SUPERVISION using the least restrictive device within 7 treatment day(s). (3.)Ms. Ganesh Mijares will ambulate with SUPERVISION for 150 feet with the least restrictive device within 7 treatment day(s). (4.)Ms. Ganesh Mijares will ascend and descend 10 steps with MINIMAL ASSIST using handrail(s) and/or handheld assist within 7 days. PHYSICAL THERAPY: Daily Note and AM Treatment Day # 2    Cm Love is a 67 y.o. female   PRIMARY DIAGNOSIS: <principal problem not specified>  Acute metabolic encephalopathy [N83.21]         ASSESSMENT:     REHAB RECOMMENDATIONS: CURRENT LEVEL OF FUNCTION:  (Most Recently Demonstrated)   Recommendation to date pending progress:  Setting:   Short-term Rehab  Equipment:    To Be Determined Bed Mobility:   Minimal Assistance  Sit to Stand:   Minimal Assistance  Transfers:   Minimal Assistance  Gait/Mobility:   Minimal Assistance x 2     ASSESSMENT:  Ms. Ganesh Mijares presents alert but still confused, a little more verbal today. Standing balance/pre-gait activities and gait training in room and hallway x 175 ft with min handheld assist of 2. Pt with heavy forward trunk lean and balance impairments, impulsivity, and fair- to poor balance. Cueing for posture, gait safety, body mechanics. Limited attention to task and is impulsive at times. Ms. Ganesh Mijares will benefit from continued therapy during hospital stay and rehab at [place]. SUBJECTIVE:   Ms. Ganesh Mijares states, \"I'm doing ok\"    SOCIAL HISTORY/ LIVING ENVIRONMENT: Spoke with son (evangelist) who reports pt lives with him and another son and daughter in two story home. His siblings are both moving out soon and he works.  Patients bedroom is upstairs; she has been unable to navigate steps for the past 3-4 months so has bee staying upstairs. Needs some assistance with ADLs. Denies DME use or falls prior to admission. States patients speech and memory have been drastically declining.    Home Environment: Private residence  One/Two Story Residence: Two story  Living Alone: No  Support Systems: Child(segundo)  OBJECTIVE:     PAIN: VITAL SIGNS: LINES/DRAINS:   Pre Treatment: Pain Screen  Pain Scale 1: FLACC  Pain Intensity 1: 0  Post Treatment: 0/10 FLACC Vital Signs  O2 Device: Room air IV  O2 Device: Room air     MOBILITY: I Mod I S SBA CGA Min Mod Max Total  NT x2 Comments:   Bed Mobility    Rolling [] [] [] [] [] [x] [] [] [] [] []    Supine to Sit [] [] [] [] [] [x] [] [] [] [] []    Scooting [] [] [] [] [] [x] [] [] [] [] []    Sit to Supine [] [] [] [] [] [] [] [] [] [] []    Transfers    Sit to Stand [] [] [] [] [] [x] [] [] [] [] []    Bed to Chair [] [] [] [] [] [x] [] [] [] [] []    Stand to Sit [] [] [] [] [] [x] [] [] [] [] []    I=Independent, Mod I=Modified Independent, S=Supervision, SBA=Standby Assistance, CGA=Contact Guard Assistance,   Min=Minimal Assistance, Mod=Moderate Assistance, Max=Maximal Assistance, Total=Total Assistance, NT=Not Tested    BALANCE: Good Fair+ Fair Fair- Poor NT Comments   Sitting Static [] [] [x] [] [] []    Sitting Dynamic [] [] [x] [] [] []              Standing Static [] [] [] [x] [x] []    Standing Dynamic [] [] [] [x] [x] []      GAIT: I Mod I S SBA CGA Min Mod Max Total  NT x2 Comments:   Level of Assistance [] [] [] [] [] [x] [] [] [] [] []    Distance 175 ft    DME min HHA x2    Gait Quality Shuffled; forward lean    Weightbearing  Status N/A     I=Independent, Mod I=Modified Independent, S=Supervision, SBA=Standby Assistance, CGA=Contact Guard Assistance,   Min=Minimal Assistance, Mod=Moderate Assistance, Max=Maximal Assistance, Total=Total Assistance, NT=Not Tested    PLAN:   FREQUENCY/DURATION: PT Plan of Care: 3 times/week for duration of hospital stay or until stated goals are met, whichever comes first.  TREATMENT:     TREATMENT:   ($$ Therapeutic Activity: 8-22 mins    )  Co-Treatment PT/OT necessary due to patient's decreased overall endurance/tolerance levels, as well as need for high level skilled assistance to complete functional transfers/mobility and functional tasks  Gait Training (15 Minutes): Gait training for 175 feet utilizing handheld assist of 2. Patient required Manual, Tactile, Verbal and Visual cueing to improve Activity Pacing, Dynamic Standing Balance, Gait Mechanics and posture.         TREATMENT GRID:  N/A    AFTER TREATMENT POSITION/PRECAUTIONS:  Chair, Needs within reach and lap belt and mitts, sitter at bedside    INTERDISCIPLINARY COLLABORATION:  RN/PCT, PT/PTA and OT/HUERTAS    TOTAL TREATMENT DURATION:  PT Patient Time In/Time Out  Time In: 1140  Time Out: [address], DPT

## 2021-03-25 ENCOUNTER — APPOINTMENT (OUTPATIENT)
Dept: GENERAL RADIOLOGY | Age: 76
DRG: 689 | End: 2021-03-25
Attending: FAMILY MEDICINE
Payer: MEDICARE

## 2021-03-25 PROBLEM — E43 SEVERE PROTEIN-CALORIE MALNUTRITION (HCC): Status: ACTIVE | Noted: 2021-03-25

## 2021-03-25 LAB
ANION GAP SERPL CALC-SCNC: 8 MMOL/L (ref 7–16)
BACTERIA SPEC CULT: NORMAL
BASOPHILS # BLD: 0 K/UL (ref 0–0.2)
BASOPHILS NFR BLD: 1 % (ref 0–2)
BUN SERPL-MCNC: 11 MG/DL (ref 8–23)
CALCIUM SERPL-MCNC: 9.2 MG/DL (ref 8.3–10.4)
CHLORIDE SERPL-SCNC: 108 MMOL/L (ref 98–107)
CO2 SERPL-SCNC: 25 MMOL/L (ref 21–32)
CREAT SERPL-MCNC: 1.3 MG/DL (ref 0.6–1)
DIFFERENTIAL METHOD BLD: ABNORMAL
EOSINOPHIL # BLD: 0.1 K/UL (ref 0–0.8)
EOSINOPHIL NFR BLD: 2 % (ref 0.5–7.8)
ERYTHROCYTE [DISTWIDTH] IN BLOOD BY AUTOMATED COUNT: 13.9 % (ref 11.9–14.6)
GLUCOSE SERPL-MCNC: 93 MG/DL (ref 65–100)
HCT VFR BLD AUTO: 35.6 % (ref 35.8–46.3)
HGB BLD-MCNC: 11.5 G/DL (ref 11.7–15.4)
IMM GRANULOCYTES # BLD AUTO: 0 K/UL (ref 0–0.5)
IMM GRANULOCYTES NFR BLD AUTO: 0 % (ref 0–5)
LYMPHOCYTES # BLD: 2 K/UL (ref 0.5–4.6)
LYMPHOCYTES NFR BLD: 43 % (ref 13–44)
MCH RBC QN AUTO: 30.5 PG (ref 26.1–32.9)
MCHC RBC AUTO-ENTMCNC: 32.3 G/DL (ref 31.4–35)
MCV RBC AUTO: 94.4 FL (ref 79.6–97.8)
MONOCYTES # BLD: 0.3 K/UL (ref 0.1–1.3)
MONOCYTES NFR BLD: 7 % (ref 4–12)
NEUTS SEG # BLD: 2.1 K/UL (ref 1.7–8.2)
NEUTS SEG NFR BLD: 47 % (ref 43–78)
NRBC # BLD: 0 K/UL (ref 0–0.2)
PLATELET # BLD AUTO: 165 K/UL (ref 150–450)
PMV BLD AUTO: 10.2 FL (ref 9.4–12.3)
POTASSIUM SERPL-SCNC: 3.1 MMOL/L (ref 3.5–5.1)
RBC # BLD AUTO: 3.77 M/UL (ref 4.05–5.2)
SERVICE CMNT-IMP: NORMAL
SODIUM SERPL-SCNC: 141 MMOL/L (ref 136–145)
WBC # BLD AUTO: 4.5 K/UL (ref 4.3–11.1)

## 2021-03-25 PROCEDURE — 2709999900 HC NON-CHARGEABLE SUPPLY

## 2021-03-25 PROCEDURE — 74011250636 HC RX REV CODE- 250/636: Performed by: INTERNAL MEDICINE

## 2021-03-25 PROCEDURE — 80048 BASIC METABOLIC PNL TOTAL CA: CPT

## 2021-03-25 PROCEDURE — 36415 COLL VENOUS BLD VENIPUNCTURE: CPT

## 2021-03-25 PROCEDURE — 74011000258 HC RX REV CODE- 258: Performed by: INTERNAL MEDICINE

## 2021-03-25 PROCEDURE — 99356 PR PROLONGED SVC I/P OR OBS SETTING 1ST HOUR: CPT | Performed by: NURSE PRACTITIONER

## 2021-03-25 PROCEDURE — 85025 COMPLETE CBC W/AUTO DIFF WBC: CPT

## 2021-03-25 PROCEDURE — 65270000029 HC RM PRIVATE

## 2021-03-25 PROCEDURE — 74018 RADEX ABDOMEN 1 VIEW: CPT

## 2021-03-25 PROCEDURE — 92526 ORAL FUNCTION THERAPY: CPT

## 2021-03-25 PROCEDURE — 74011250636 HC RX REV CODE- 250/636: Performed by: FAMILY MEDICINE

## 2021-03-25 PROCEDURE — 99222 1ST HOSP IP/OBS MODERATE 55: CPT | Performed by: NURSE PRACTITIONER

## 2021-03-25 RX ORDER — POTASSIUM CHLORIDE 14.9 MG/ML
20 INJECTION INTRAVENOUS
Status: COMPLETED | OUTPATIENT
Start: 2021-03-25 | End: 2021-03-26

## 2021-03-25 RX ORDER — MORPHINE SULFATE 2 MG/ML
1 INJECTION, SOLUTION INTRAMUSCULAR; INTRAVENOUS
Status: DISCONTINUED | OUTPATIENT
Start: 2021-03-25 | End: 2021-04-08 | Stop reason: HOSPADM

## 2021-03-25 RX ORDER — HYDROCODONE BITARTRATE AND ACETAMINOPHEN 5; 325 MG/1; MG/1
1 TABLET ORAL
Status: DISCONTINUED | OUTPATIENT
Start: 2021-03-25 | End: 2021-04-08 | Stop reason: HOSPADM

## 2021-03-25 RX ADMIN — MORPHINE SULFATE 1 MG: 2 INJECTION, SOLUTION INTRAMUSCULAR; INTRAVENOUS at 20:29

## 2021-03-25 RX ADMIN — DEXTROSE MONOHYDRATE 75 ML/HR: 5 INJECTION, SOLUTION INTRAVENOUS at 02:34

## 2021-03-25 RX ADMIN — ENOXAPARIN SODIUM 40 MG: 40 INJECTION SUBCUTANEOUS at 09:04

## 2021-03-25 RX ADMIN — POTASSIUM CHLORIDE 20 MEQ: 14.9 INJECTION, SOLUTION INTRAVENOUS at 20:29

## 2021-03-25 RX ADMIN — DEXTROSE MONOHYDRATE 75 ML/HR: 5 INJECTION, SOLUTION INTRAVENOUS at 20:30

## 2021-03-25 RX ADMIN — POTASSIUM CHLORIDE 20 MEQ: 14.9 INJECTION, SOLUTION INTRAVENOUS at 22:22

## 2021-03-25 RX ADMIN — Medication 10 ML: at 15:29

## 2021-03-25 RX ADMIN — Medication 10 ML: at 06:26

## 2021-03-25 RX ADMIN — Medication 10 ML: at 20:31

## 2021-03-25 RX ADMIN — CEFTRIAXONE SODIUM 1 G: 1 INJECTION, POWDER, FOR SOLUTION INTRAMUSCULAR; INTRAVENOUS at 15:29

## 2021-03-25 RX ADMIN — POTASSIUM CHLORIDE 20 MEQ: 14.9 INJECTION, SOLUTION INTRAVENOUS at 17:46

## 2021-03-25 NOTE — PROGRESS NOTES
LTG: Patient will tolerate least restrictive diet without overt signs or symptoms of airway compromise. STG: Patient will tolerate thin liquids without overt signs or symptoms of airway compromise. STG: Patient will tolerate ongoing po trials in efforts to advance diet. STG: Patient will participate in modified barium swallow study as clinically indicated. SPEECH LANGUAGE PATHOLOGY: DYSPHAGIA- Daily Note 2    NAME/AGE/GENDER: Juan Manuel Wharton is a 67 y.o. female  DATE: 3/25/2021  PRIMARY DIAGNOSIS: Acute metabolic encephalopathy [P29.90]      ICD-10: Treatment Diagnosis: R13.12 Dysphagia, Oropharyngeal Phase    RECOMMENDATIONS   DIET: WITH 1:1 ASSSITANCE   Thin Liquids by spoon    MEDICATIONS: Non-oral     ASPIRATION PRECAUTIONS  · Slow rate of intake  · Small bites/sips  · Upright at 90 degrees during meal     COMPENSATORY STRATEGIES/MODIFICATIONS  · Liquids by spoon when awake/alert and accepting  · 1:1 assistance  · Cues to swallow      RECOMMENDATIONS for CONTINUED SPEECH THERAPY:   YES: Anticipate need for ongoing speech therapy during this hospitalization and at next level of care. ASSESSMENT   Patient continues to present with significant oral dysphagia characterized by anterior loss with liquid by teaspoon and oral holding with thin and puree trials. Poor oral acceptance with patient shaking head no and turning head away from spoon after minimal trials. Recommend liquids only/thin liquids via spoon for pleasure. Needs 1:1 assistance with oral intake. Only provide po when patient is alert and upright. Patient unable to meet nutritional needs orally due to poor acceptance and current diet recommendations. Palliative care consult pending to discuss goals of care and patient/family wishes. Will continue to follow if still in line with goals of care.     EDUCATION:  · Recommendations discussed with Patient and RN  CONTINUATION OF SKILLED SERVICES/MEDICAL NECESSITY:   Patient is expected to demonstrate progress in  swallow strength, swallow timeliness, swallow function, diet tolerance and swallow safety in order to  improve swallow safety, work toward diet advancement and decrease aspiration risk.  Patient continues to require skilled intervention due to dysphagia. REHABILITATION POTENTIAL FOR STATED GOALS: Good    PLAN    FREQUENCY/DURATION: Continue to follow patient 3 times a week for duration of hospital stay to address above goals. - Recommendations for next treatment session: Next treatment session will address diet tolerance and po trials    SUBJECTIVE   Seated upright in bed with legs crossed. Nodding head yes to name. Rae affect. Restless and moving around in bed. Not responding to questions or commands. Oxygen Device: room air  Pain: Pain Scale 1: Adult Nonverbal Pain Scale  Pain Intensity 1: 0    History of Present Injury/Illness: Ms. Braulio Almeida  has no past medical history on file. . She also  has no past surgical history on file. PRECAUTIONS/ALLERGIES: Patient has no known allergies. Problem List:  (Impairments causing functional limitations):  1. Oropharyngeal dysphagia     Orientation:  Nodded head yes to name. OBJECTIVE     Dysphagia treatment-po trials/tolerance  Patient consumed thin by teaspoon/single straw sips and puree. Poor oral acceptance with patient only taking 3 teaspoon of thin liquid, 2 small sips of thin by straw, and 3 trials of puree. Minimal anterior loss with thin by teaspoon due to difficulty coordinating acceptance. Mild oral holding with liquids by teaspoon and straw; however, no overt s/sx airway compromise. Initally only mild oral holding occurred with puree across 2 trials; however, significant oral holding with 3rd trial of puree for over 20 seconds before patient swallowed bolus. Patient shaking head \"no\" and turning head away from further thin liquid and puree trials.           Tool Used: Dysphagia Outcome and Severity Scale (WILY)    Score Comments   Normal Diet  [] 7 With no strategies or extra time needed   Functional Swallow  [] 6 May have mild oral or pharyngeal delay   Mild Dysphagia  [] 5 Which may require one diet consistency restricted    Mild-Moderate Dysphagia  [] 4 With 1-2 diet consistencies restricted   Moderate Dysphagia  [] 3 With 2 or more diet consistencies restricted   Moderate-Severe Dysphagia  [] 2 With partial PO strategies (trials with ST only)   Severe Dysphagia  [] 1 With inability to tolerate any PO safely      Score:  Initial: 3 Most Recent: 3 (Date 03/25/21 )   Interpretation of Tool: The Dysphagia Outcome and Severity Scale (WILY) is a simple, easy-to-use, 7-point scale developed to systematically rate the functional severity of dysphagia based on objective assessment and make recommendations for diet level, independence level, and type of nutrition.          INTERDISCIPLINARY COLLABORATION: RN    After treatment position/precautions:  · Upright in bed  · RN notified   · Sitter at bedside     Total Treatment Duration:   Time In: 1113  Time Out: [address] [facility] [address], [address] [facility]

## 2021-03-25 NOTE — CONSULTS
Palliative Care    Patient: Wes Pyle MRN: [id number]  SSN: [id number]    YOB: 1945  Age: 67 y.o. Sex: female       Date of Request: 3/24/21  Date of Consult:  3/25/2021  Reason for Consult:  goals of care  Requesting Physician: Dr Analisa Macias     Assessment/Plan:     Principal Diagnosis:    Dysphagia  R13.10    Additional Diagnoses:   Dementia  F03.90  Encephalopathy, Unspecified  G93.40  Encounter for Palliative Care  Z51.5    Palliative Performance Scale (PPS):       Medical Decision Making:   Reviewed and summarized notes from admission to present. Discussed case with appropriate providers: VISHNU Galindo, Dr Bharat Dumont  Reviewed laboratory and x-ray data from admission to present. Pt resting in bed, sitter present. Ms Mayank Ramey is fidgety, sitting up against the headboard, then moving back down in the bed. She is able to answer that she does not have pain, but is unable to answer orientation questions or other symptom questions. She has been able to walk in the halls with PT/OT. Speech has placed the pt on a diet of liquids by spoon due to dysphagia. It's unclear how much of the pt's confusion and dysphagia are due to the UTI, dementia, and schizophrenia. Per VISHNU Churchill, the family is in agreement for the pt to go to a SNF on discharge. However, the pt must have adequate nutritional support before going to a SNF. Spoke on phone with pt's son/health care surrogate Ana Maria Garcia. Charo Das, his brother, and his sister live with the pt and alter their work schedules to take turns providing care around the clock. Charo Das would like for his mother to receive NGT feedings for now. Once her antibiotics are completed on 3/29/21, he may opt for a PEG, so that the pt can go to rehab. We discussed that, with the pt's dementia, she may try to remove the PEG.   We also discussed that the pt's dementia will progress, so that at some point, they may wish to stop using the PEG and provide hospice care. Mr Rogelio Tran expressed his understanding. Dr Marlo Briscoe was made aware and agreed to nutrition via PEG; I've placed the orders. We discussed the low likelihood of success of CPR in the elderly; Mr Rogelio Tran will talk with his siblings to determine if the pt should be made DNR. Will discuss findings with members of the interdisciplinary team.      Thank you for this referral.          .  In addition to the E&M described above, more than 50% of a 35-minute prolonged visit, from [phone], was spent on counseling and coordination of care. Subjective:     History obtained from:  Family, Care Provider and Chart    Chief Complaint: Dysphagia    History of Present Illness:  Patient is a 67 y.o. female with medical h/o dementia, schizophrenia who presented to [facility] ER on 3/22/21 with report of several weeks progressive confusion, and worsening agitation/confusion/ shuffling gait for past 3 days. Patient is minimally conversant so history obtained from son at bedside. She lives with her son with other family helping at times. They are overwhelmed by her needs and are concerned for her safety due to her wandering behaviors. Found to have UTI. Found to have dysphagia; now on liquids by spoon. Advance Directive: No       Code Status:  Full Code            Health Care Power of : No - Patient does not have a [address] No past medical history on file. No past surgical history on file. No family history on file. Social History     Tobacco Use    Smoking status: Not on file   Substance Use Topics    Alcohol use: Not on file     Prior to Admission medications    Medication Sig Start Date End Date Taking? Authorizing Provider   fluPHENAZine decanoate (PROLIXIN) 25 mg/mL injection 2.5 mg by IntraMUSCular route Once every 2 weeks. Patient family and psychiatrist paperwork state Prolixin 2.5 mg/ml IM every two weeks.   Indications: schizophrenia   Yes Provider, Historical deutetrabenazine (Austedo) 12 mg tab Take 12 mg by mouth daily. Indications: tardive dyskinesia, a disorder characterized by involuntary movements of the face, mouth and tongue    Provider, Historical       No Known Allergies     Review of Systems:  A comprehensive review of systems was negative except as described above. Objective:     Visit Vitals  BP (!) 148/80   Pulse 70   Temp 97.8 °F (36.6 °C)   Resp 18   Ht 5' 4\" (1.626 m)   Wt 115 lb 9.6 oz (52.4 kg)   SpO2 97%   BMI 19.84 kg/m²        Physical Exam:    General:  Cooperative. No acute distress; fidgety   Eyes:  Conjunctivae/corneas clear    Nose: Nares normal. Septum midline. Neck: Supple, symmetrical, trachea midline, no JVD   Lungs:   Clear to auscultation bilaterally, unlabored   Heart:  Regular rate and rhythm, no murmur    Abdomen:   Soft, non-tender, non-distended   Extremities: Normal, atraumatic, no cyanosis or edema   Skin: Skin color, texture, turgor normal. No rash or lesions.    Neurologic: Confused   Psych: Alert, not oriented      Assessment:     Hospital Problems  Never Reviewed          Codes Class Noted POA    Severe protein-calorie malnutrition ([facility] ICD-10-CM: E43  ICD-9-CM: 656  3/25/2021 Yes        Schizophrenia ([facility] ICD-10-CM: F20.9  ICD-9-CM: 295.90  3/24/2021 Unknown        Dementia ([facility] ICD-10-CM: F03.90  ICD-9-CM: 294.20  3/24/2021 Unknown        * (Principal) Acute metabolic encephalopathy FRA-69-QP: G93.41  ICD-9-CM: 348.31  3/22/2021 Unknown              Signed By: Thee Londono NP     March 25, 2021

## 2021-03-25 NOTE — CONSULTS
Comprehensive Nutrition Assessment    Type and Reason for Visit: Reassess, Consult  Tube Feeding Management (Palliative Care)    Nutrition Recommendations/Plan:   Enteral Nutrition: once NGT placed and placement confirmed  Jevity 1.5 via NGT at 15ml/hr and progress by 10ml/hour every 8 hours to goal rate of 45ml/hour. Water flush 115ml Q4H. At goal will provide 1485 kcal (100% estimated calorie needs), 63 grams protein (100% estimated protein needs) and 1442ml free fluid (1ml/kcal) calculations based on 22 hours infusion. IVF:  Per MD.   Vitamin and Mineral Supplement Therapy:  Electrolyte management replacement protocol active. Unable to provide K replacement at this time (no NGT)  Labs:   BMP daily, Mg and Phos MWF.      Oral Nutrition: Continue current diet per SLP and Ensure Enlive TID with meals     Malnutrition Assessment:  Malnutrition Status: Severe malnutrition  Context: Chronic illness  Findings of clinical characteristics of malnutrition:   Energy Intake:  Mild decrease in energy intake (specify)(poor po intake reported per son)  Weight Loss:  7.0 - Greater than 7.5% over 3 months(133lbs at [facility] visit; 13% in 2 months)     Body Fat Loss:  7 - Severe body fat loss, Triceps, Orbital   Muscle Mass Loss:  1 - Mild muscle mass loss, Calf (gastrocnemius), Temples (temporalis), Thigh (quadraceps), Clavicles (pectoralis &deltoids)  Fluid Accumulation:  Unable to assess,     Strength:  Not performed     Nutrition Assessment:   Nutrition History: Unable to assess. Per SLP note on 3/24, son reports pt has had poor po intake since 3/18. Pt is taking a bite or 2 of foods such as mashed potatoes. Noted ED visit at [facility] on 1/2/21 due to cognitive decline. Per ED note, pt's son Ashley Resides), stated pt was fine until December 26th and pt was unable to walk well, not eating, and unable to carry on conversations with family members.  Noted pt has had a significant wt loss since office visit at [address] [place] [address] on 1/28/21. Nutrition Background: Pt presents due to several weeks of progressive confusion and worsening agitation. PMH notable for dementia and schizophrenia. Daily Update:  [facility] today and met with pt's son Alysha Lopes) who would like to receive NGT feedings for now once antibiotics are completed on 3/29/21. At that point he will consider a PEG. Observed pt without soft mitt restraints today. Per PC note, she is fidgety and moving about the bed. No NGT in at this time. Discussed with RN, will place TF order to specify once NGT is placed and placement confirmed to have TF started. Abdominal Status (last documented): Intact abdomen with Hypoactive  bowel sounds. Pertinent Medications: PRN Miralax,   IVF: D5 @ 75ml/hr (306kcal/day)  Pertinent Labs:     Lab Results   Component Value Date/Time     03/25/2021 05:47 AM    K 3.1 (L) 03/25/2021 05:47 AM     (H) 03/25/2021 05:47 AM    CO2 25 03/25/2021 05:47 AM    AGAP 8 03/25/2021 05:47 AM    GLU 93 03/25/2021 05:47 AM    BUN 11 03/25/2021 05:47 AM    CREA 1.30 (H) 03/25/2021 05:47 AM    GFRAA 51 (L) 03/25/2021 05:47 AM    GFRNA 42 (L) 03/25/2021 05:47 AM    CA 9.2 03/25/2021 05:47 AM       Current Nutrition Therapies:  DIET CLEAR LIQUID  DIET NUTRITIONAL SUPPLEMENTS All Meals; Ensure Enlive    Current Intake:   Average Meal Intake: 0% Average Supplement Intake: 0%      Anthropometric Measures:  Height: 5' 4\" (162.6 cm)  Current Body Wt: 52.4 kg (115 lb 8.3 oz)(3/24), Weight source: Standing scale  BMI: 19.8, Underweight (BMI less than 22) age over 72  Admission Body Weight: 115 lb 8.3 oz(standing scale; 3/24)  Ideal Body Weight (lbs) (Calculated): 120 lbs (55 kg), 96.3 %  Usual Body Wt: 60.4 kg (133 lb 2.5 oz)(1/28/21;  [facility]), Percent weight change: -13.2          Edema: Facial: 1+ (to right side of neck) (3/24/2021  7:34 AM)     Estimated Daily Nutrient Needs:  Energy (kcal/day): 4425-9074 (Kcal/kg(25-30), Weight Used: Current(52.4kg))  Protein (g/day): 52-63(1-1.2gm/kg) Weight Used: (Current(52.4kg))  Fluid (ml/day):   (1 ml/kcal)    Nutrition Diagnosis:   · Severe malnutrition, In context of chronic illness related to cognitive or neurological impairment, inadequate protein-energy intake as evidenced by (criteria provided in malnutrition assessment above)    · Inadequate oral intake related to cognitive or neurological impairment, swallowing difficulty as evidenced by (CLQ from spoon only; NGT for EN)    Nutrition Interventions:   Food and/or Nutrient Delivery: Continue current diet, Continue oral nutrition supplement, Start tube feeding     Coordination of Nutrition Care: Continue to monitor while inpatient  Plan of Care discussed with Tasha Bautista RN    Goals:   Previous Goal Met: Progressing toward goal(s)  Active Goal: Tolerate EN at goal rate within 5 days    Nutrition Monitoring and Evaluation:      Food/Nutrient Intake Outcomes: Diet advancement/tolerance, Food and nutrient intake, Supplement intake, Enteral nutrition intake/tolerance       Discharge Planning:     Too soon to determine    Electronically signed by Lakesha Yeung MS, RDN, LD 3/25/2021 at 4:01 PM  Contact: [phone]

## 2021-03-25 NOTE — PROGRESS NOTES
Patient remains in stable condition with respirations even/unlabored. No acute distress noted. No needs noted or voiced at this time. Safety measures in place. Patient on room air. NGT to right nare @ 70 cm. Sitter at bedside. Call light remains within reach. Preparing to give report to oncoming shift.

## 2021-03-25 NOTE — PROGRESS NOTES
Bridle placed to NGT with assist of ICU rover. Patient tolerated well. Family remains at bedside. Awaiting results of KUB.

## 2021-03-25 NOTE — PROGRESS NOTES
NGT placed at 55 cms. Patient tolerated procedure fairly. Son at bedside. Will order KUB to confirm placement.

## 2021-03-25 NOTE — PROGRESS NOTES
Received bedside shift report from offgoing nurseAndres. Patient resting quietly in bed at this time, awake, respirations even and unlabored. Denies needs at this time. Family at bedside. Bed low and locked. Bedside table, personal belongings and call light within reach. Bed alarm and sitter at bedside for safety.

## 2021-03-25 NOTE — PROGRESS NOTES
HEATHER from [facility]. Patient in stable condition with resps even/unlabored. NAD noted. Patient on room air. Safety measures noted. Will continue to monitor per policy.

## 2021-03-25 NOTE — PROGRESS NOTES
Progress Note    Patient: Elisa Kingston MRN: [id number]  SSN: [id number]    YOB: 1945  Age: 67 y.o. Sex: female      Admit Date: 3/22/2021    LOS: 3 days     Subjective:     \"75 y. o. female with medical h/o dementia, schizophrenia who presented to [facility] ER with report of several weeks progressive confusion and worsening agitation/confusion/ shuffling gait for past 3 days. Patient is minimally conversant so history obtained from son at bedside. She lives with her son with other family helping at times. They are overwhelmed by her needs and are concerned for her safety due to her wandering behaviors. \" They would like her to be placed into a nursing home. UTI found. Speech therapy has not cleared for regular or thick liquids due to patient pocketing her food. Spoken to family who would want NG tube feedings while on abx and then to readdress oral intake to see if will need PEG tube. Urine culture with mixed colonies. K 3.1. Cannot obtain ROS from patient.    Current Facility-Administered Medications   Medication Dose Route Frequency    NUTRITIONAL SUPPORT ELECTROLYTE PRN ORDERS   Does Not Apply PRN    HYDROcodone-acetaminophen (NORCO) 5-325 mg per tablet 1 Tab  1 Tab Oral Q4H PRN    morphine injection 1 mg  1 mg IntraVENous Q4H PRN    dextrose 5% infusion  75 mL/hr IntraVENous CONTINUOUS    sodium chloride (NS) flush 5-40 mL  5-40 mL IntraVENous Q8H    sodium chloride (NS) flush 5-40 mL  5-40 mL IntraVENous PRN    acetaminophen (TYLENOL) tablet 650 mg  650 mg Oral Q6H PRN    Or    acetaminophen (TYLENOL) suppository 650 mg  650 mg Rectal Q6H PRN    polyethylene glycol (MIRALAX) packet 17 g  17 g Oral DAILY PRN    promethazine (PHENERGAN) tablet 12.5 mg  12.5 mg Oral Q6H PRN    Or    ondansetron (ZOFRAN) injection 4 mg  4 mg IntraVENous Q6H PRN    enoxaparin (LOVENOX) injection 40 mg  40 mg SubCUTAneous DAILY    cefTRIAXone (ROCEPHIN) 1 g in 0.9% sodium chloride (MBP/ADV) 50 mL MBP  1 g IntraVENous Q24H    hydrALAZINE (APRESOLINE) 20 mg/mL injection 20 mg  20 mg IntraVENous Q6H PRN       Objective:     Vitals:    03/25/21 0728 03/25/21 0750 03/25/21 1116 03/25/21 1525   BP: (!) 102/52  (!) 148/80 134/82   Pulse: 71 71 70 82   Resp: 18  18 20   Temp: 97.7 °F (36.5 °C)  97.8 °F (36.6 °C) 97.5 °F (36.4 °C)   SpO2: 99%  97% 94%   Weight:       Height:             Intake and Output:  Current Shift: 03/25 0701 - 03/25 1900  In: 1813.8 [P.O.:60; I.V.:1753.8]  Out: -   Last three shifts: 03/23 1901 - 03/25 0700  In: 3157.8 [P.O.:160; I.V.:2997.8]  Out: 200 [Urine:200]    Physical Exam:   General:  Alert, no distress, does not speak. Moving around in bed. Eyes:  Conjunctivae/corneas clear. Ears:  Normal TMs and external ear canals both ears. Nose: Nares normal. Septum midline. Mouth/Throat: Poor dentition   Neck:  no JVD. Back:   Symmetric, no curvature. ROM normal. No CVA tenderness. Lungs:   Clear to auscultation bilaterally. Heart:  Regular rate and rhythm, S1, S2 normal   Abdomen:   Soft, non-tender. Bowel sounds normal. No masses,  No organomegaly. Extremities: Extremities normal, atraumatic, no cyanosis or edema. Pulses: 2+ and symmetric all extremities. Skin: Skin color, texture, turgor normal. No rashes or lesions   Lymph nodes: Cervical, supraclavicular, and axillary nodes normal.   Neurologic: Not speaking. No noted outbursts.         Lab/Data Review:    Recent Results (from the past 24 hour(s))   PLEASE READ & DOCUMENT PPD TEST IN 48 HRS    Collection Time: 03/24/21  5:27 PM   Result Value Ref Range    PPD Negative Negative    mm Induration 0 0 - 5 mm   WET PREP    Collection Time: 03/24/21  5:48 PM    Specimen: Vagina   Result Value Ref Range    Special Requests: NO SPECIAL REQUESTS      Wet prep 3 TO 5 WBC/HPF     Wet prep NO YEAST,TRICHOMONAS OR CLUE CELLS NOTED     CBC WITH AUTOMATED DIFF    Collection Time: 03/25/21  5:47 AM   Result Value Ref Range    WBC 4.5 4.3 - 11.1 K/uL    RBC 3.77 (L) 4.05 - 5.2 M/uL    HGB 11.5 (L) 11.7 - 15.4 g/dL    HCT 35.6 (L) 35.8 - 46.3 %    MCV 94.4 79.6 - 97.8 FL    MCH 30.5 26.1 - 32.9 PG    MCHC 32.3 31.4 - 35.0 g/dL    RDW 13.9 11.9 - 14.6 %    PLATELET 935 186 - 377 K/uL    MPV 10.2 9.4 - 12.3 FL    ABSOLUTE NRBC 0.00 0.0 - 0.2 K/uL    DF AUTOMATED      NEUTROPHILS 47 43 - 78 %    LYMPHOCYTES 43 13 - 44 %    MONOCYTES 7 4.0 - 12.0 %    EOSINOPHILS 2 0.5 - 7.8 %    BASOPHILS 1 0.0 - 2.0 %    IMMATURE GRANULOCYTES 0 0.0 - 5.0 %    ABS. NEUTROPHILS 2.1 1.7 - 8.2 K/UL    ABS. LYMPHOCYTES 2.0 0.5 - 4.6 K/UL    ABS. MONOCYTES 0.3 0.1 - 1.3 K/UL    ABS. EOSINOPHILS 0.1 0.0 - 0.8 K/UL    ABS. BASOPHILS 0.0 0.0 - 0.2 K/UL    ABS. IMM. GRANS. 0.0 0.0 - 0.5 K/UL   METABOLIC PANEL, BASIC    Collection Time: 03/25/21  5:47 AM   Result Value Ref Range    Sodium 141 136 - 145 mmol/L    Potassium 3.1 (L) 3.5 - 5.1 mmol/L    Chloride 108 (H) 98 - 107 mmol/L    CO2 25 21 - 32 mmol/L    Anion gap 8 7 - 16 mmol/L    Glucose 93 65 - 100 mg/dL    BUN 11 8 - 23 MG/DL    Creatinine 1.30 (H) 0.6 - 1.0 MG/DL    GFR est AA 51 (L) >60 ml/min/1.73m2    GFR est non-AA 42 (L) >60 ml/min/1.73m2    Calcium 9.2 8.3 - 10.4 MG/DL       Assessment/ Plan:     Principal Problem:    Acute metabolic encephalopathy (7/10/8758)    Active Problems:    Schizophrenia ([address] (3/24/2021)      Dementia (HCC) (3/24/2021)      Severe protein-calorie malnutrition ([address] (3/25/2021)    Per son, seems to be near her baseline. Has been declining since December of last year. Family wanting nursing home. UTI- Ceftriaxone 3/23-3/28    Hypokalemia - Supplement     Schizophrenia - tele psych recommended austedo 12mg daily. We do not carry, I will ask son to bring in. Discussed poor oral intake. NG tube for now, they want to readdress PEG tube over next few days.      DVT prophylaxis - Lovenox  Signed By: Radha Zamora DO     March 25, 2021

## 2021-03-26 ENCOUNTER — APPOINTMENT (OUTPATIENT)
Dept: GENERAL RADIOLOGY | Age: 76
DRG: 689 | End: 2021-03-26
Attending: INTERNAL MEDICINE
Payer: MEDICARE

## 2021-03-26 LAB
ANION GAP SERPL CALC-SCNC: 6 MMOL/L (ref 7–16)
BUN SERPL-MCNC: 9 MG/DL (ref 8–23)
CALCIUM SERPL-MCNC: 9.2 MG/DL (ref 8.3–10.4)
CHLORIDE SERPL-SCNC: 108 MMOL/L (ref 98–107)
CO2 SERPL-SCNC: 26 MMOL/L (ref 21–32)
CREAT SERPL-MCNC: 1.19 MG/DL (ref 0.6–1)
GLUCOSE SERPL-MCNC: 119 MG/DL (ref 65–100)
MAGNESIUM SERPL-MCNC: 1.8 MG/DL (ref 1.8–2.4)
PHOSPHATE SERPL-MCNC: 3.2 MG/DL (ref 2.3–3.7)
POTASSIUM SERPL-SCNC: 3.2 MMOL/L (ref 3.5–5.1)
SODIUM SERPL-SCNC: 140 MMOL/L (ref 136–145)

## 2021-03-26 PROCEDURE — 2709999900 HC NON-CHARGEABLE SUPPLY

## 2021-03-26 PROCEDURE — 77030018798 HC PMP KT ENTRL FED COVD -A

## 2021-03-26 PROCEDURE — 92526 ORAL FUNCTION THERAPY: CPT

## 2021-03-26 PROCEDURE — 36415 COLL VENOUS BLD VENIPUNCTURE: CPT

## 2021-03-26 PROCEDURE — 83735 ASSAY OF MAGNESIUM: CPT

## 2021-03-26 PROCEDURE — 74018 RADEX ABDOMEN 1 VIEW: CPT

## 2021-03-26 PROCEDURE — 97530 THERAPEUTIC ACTIVITIES: CPT

## 2021-03-26 PROCEDURE — 97116 GAIT TRAINING THERAPY: CPT

## 2021-03-26 PROCEDURE — 84100 ASSAY OF PHOSPHORUS: CPT

## 2021-03-26 PROCEDURE — 74011250636 HC RX REV CODE- 250/636: Performed by: INTERNAL MEDICINE

## 2021-03-26 PROCEDURE — 74011000250 HC RX REV CODE- 250: Performed by: FAMILY MEDICINE

## 2021-03-26 PROCEDURE — 74011250636 HC RX REV CODE- 250/636: Performed by: FAMILY MEDICINE

## 2021-03-26 PROCEDURE — 80048 BASIC METABOLIC PNL TOTAL CA: CPT

## 2021-03-26 PROCEDURE — 65270000029 HC RM PRIVATE

## 2021-03-26 PROCEDURE — 74011000258 HC RX REV CODE- 258: Performed by: INTERNAL MEDICINE

## 2021-03-26 RX ORDER — POTASSIUM CHLORIDE 14.9 MG/ML
20 INJECTION INTRAVENOUS
Status: COMPLETED | OUTPATIENT
Start: 2021-03-26 | End: 2021-03-26

## 2021-03-26 RX ADMIN — Medication 10 ML: at 13:37

## 2021-03-26 RX ADMIN — ENOXAPARIN SODIUM 40 MG: 40 INJECTION SUBCUTANEOUS at 09:04

## 2021-03-26 RX ADMIN — POTASSIUM CHLORIDE 20 MEQ: 14.9 INJECTION, SOLUTION INTRAVENOUS at 11:28

## 2021-03-26 RX ADMIN — POTASSIUM CHLORIDE 20 MEQ: 14.9 INJECTION, SOLUTION INTRAVENOUS at 13:37

## 2021-03-26 RX ADMIN — ZIPRASIDONE MESYLATE 10 MG: 20 INJECTION, POWDER, LYOPHILIZED, FOR SOLUTION INTRAMUSCULAR at 02:23

## 2021-03-26 RX ADMIN — CEFTRIAXONE SODIUM 1 G: 1 INJECTION, POWDER, FOR SOLUTION INTRAMUSCULAR; INTRAVENOUS at 12:49

## 2021-03-26 RX ADMIN — MORPHINE SULFATE 1 MG: 2 INJECTION, SOLUTION INTRAMUSCULAR; INTRAVENOUS at 08:49

## 2021-03-26 RX ADMIN — Medication 10 ML: at 05:23

## 2021-03-26 RX ADMIN — Medication 10 ML: at 21:10

## 2021-03-26 NOTE — PROGRESS NOTES
Received bedside shift report from offgoing nurse, BONNIE Ponce. Patient sitting up in bed, awake, respirations even and unlabored on room air. Denies needs at this time. Family at bedside. bed remains low and locked. Bedside table, personal belongings and call light within reach. Sitter at bedside for safety. Jevity 1.5 infusing via feeding tube at 25mL/hr as ordered. BONNIE Ponce, informed this RN that the feed rate was titrated up to 25mL/hr at approximately 1600 today. Will increase feed rate by 10mL/hr at approximately midnight tonight, as it is ordered to increase feed rate by 10mL/hr every 8 hours until at goal feed rate of 45 mL/hr.

## 2021-03-26 NOTE — PROGRESS NOTES
ACUTE PHYSICAL THERAPY GOALS:  (Developed with and agreed upon by patient and/or caregiver. )  LTG:  (1.)Ms. Daphne Almaraz will move from supine to sit and sit to supine, scoot up and down and roll side to side in bed INDEPENDENTLY with bed flat within 7 treatment day(s). (2.)Ms. Daphne Almaraz will transfer from bed to chair and chair to bed with SUPERVISION using the least restrictive device within 7 treatment day(s). (3.)Ms. Daphne Almaraz will ambulate with SUPERVISION for 150 feet with the least restrictive device within 7 treatment day(s). (4.)Ms. Daphne Almaraz will ascend and descend 10 steps with MINIMAL ASSIST using handrail(s) and/or handheld assist within 7 days. PHYSICAL THERAPY: Daily Note and PM Treatment Day # 3    Debora Mann is a 67 y.o. female   PRIMARY DIAGNOSIS: Acute metabolic encephalopathy  Acute metabolic encephalopathy [J76.71]         ASSESSMENT:     REHAB RECOMMENDATIONS: CURRENT LEVEL OF FUNCTION:  (Most Recently Demonstrated)   Recommendation to date pending progress:  Setting:   Short-term Rehab  Equipment:    To Be Determined Bed Mobility:   Minimal Assistance x 2  Sit to Stand:   Minimal Assistance x 2 to moderate assist of 2  Transfers:   Moderate Assistance x 2  Gait/Mobility:   Moderate Assistance x 2     ASSESSMENT:  Ms. Daphne Almaraz presents alert however non verbal today throughout session with decreased command following. Mitts removed during therapy however pt frequently picking at lines, IV, NGT. Min-mod A x2 for bed mobility and transfers, mod A x2 for ambulation in room and hallway due to pt not wanting to accept handheld assist and seems a little more agitated. Attempted to address standing balance, posture, and gait safety- difficult due to history of dementia and limited command following. Back to bed after activity with son and sitter at bedside, mitts in place. Per chart, plans for permanent feeding tube placed on Monday and plans to dc to rehab afterwards when stable.        SUBJECTIVE:   Ms. Griselda Marr was non verbal throughout session today    SOCIAL HISTORY/ LIVING ENVIRONMENT: Spoke with son (evangelist) who reports pt lives with him and another son and daughter in two story home. His siblings are both moving out soon and he works. Patients bedroom is upstairs; she has been unable to navigate steps for the past 3-4 months so has bee staying upstairs. Needs some assistance with ADLs. Denies DME use or falls prior to admission. States patients speech and memory have been drastically declining.    Home Environment: Private residence  One/Two Story Residence: Two story  Living Alone: No  Support Systems: Child(segundo)  OBJECTIVE:     PAIN: VITAL SIGNS: LINES/DRAINS:   Pre Treatment: Pain Screen  Pain Scale 1: Numeric (0 - 10)  Pain Intensity 1: 0  Post Treatment: 0/10 FLACC Vital Signs  O2 Device: Room air IV and Nasogastric Tube  O2 Device: Room air     MOBILITY: I Mod I S SBA CGA Min Mod Max Total  NT x2 Comments:   Bed Mobility    Rolling [] [] [] [] [] [x] [x] [] [] [] []    Supine to Sit [] [] [] [] [] [x] [x] [] [] [] [x]    Scooting [] [] [] [] [] [x] [] [] [] [] []    Sit to Supine [] [] [] [] [] [x] [x] [] [] [] [x]    Transfers    Sit to Stand [] [] [] [] [] [x] [x] [] [] [] [x]    Bed to Chair [] [] [] [] [] [x] [x] [] [] [] [x]    Stand to Sit [] [] [] [] [] [x] [x] [] [] [] [x]    I=Independent, Mod I=Modified Independent, S=Supervision, SBA=Standby Assistance, CGA=Contact Guard Assistance,   Min=Minimal Assistance, Mod=Moderate Assistance, Max=Maximal Assistance, Total=Total Assistance, NT=Not Tested    BALANCE: Good Fair+ Fair Fair- Poor NT Comments   Sitting Static [] [] [x] [] [] []    Sitting Dynamic [] [] [x] [] [] []              Standing Static [] [] [] [] [x] []    Standing Dynamic [] [] [] [] [x] []      GAIT: I Mod I S SBA CGA Min Mod Max Total  NT x2 Comments:   Level of Assistance [] [] [] [] [] [x] [x] [] [] [] [x]    Distance 60 ft    DME mod HHA x2    Gait Quality Shuffled, forward lean Weightbearing  Status N/A     I=Independent, Mod I=Modified Independent, S=Supervision, SBA=Standby Assistance, CGA=Contact Guard Assistance,   Min=Minimal Assistance, Mod=Moderate Assistance, Max=Maximal Assistance, Total=Total Assistance, NT=Not Tested    PLAN:   FREQUENCY/DURATION: PT Plan of Care: 3 times/week for duration of hospital stay or until stated goals are met, whichever comes first.  TREATMENT:     TREATMENT:   ($$ Therapeutic Activity: 23-37 mins    )  Co-Treatment PT/OT necessary due to patient's decreased overall endurance/tolerance levels, as well as need for high level skilled assistance to complete functional transfers/mobility and functional tasks  Gait Training (23 Minutes): Gait training for 60 feet utilizing handheld assist of 2. Patient required Manual, Tactile, Verbal and Visual cueing to improve Activity Pacing, Dynamic Standing Balance, Gait Mechanics and posture.   Worked on standing static/dynamic pre-gait activities at bedside to address balance, posture       TREATMENT GRID:  N/A    AFTER TREATMENT POSITION/PRECAUTIONS:  Bed, Needs within reach, Visitors at bedside and sitter at bedside    INTERDISCIPLINARY COLLABORATION:  RN/PCT, PT/PTA and OT/HUERTAS    TOTAL TREATMENT DURATION:  PT Patient Time In/Time Out  Time In: 4846  Time Out: James Jameson DPT

## 2021-03-26 NOTE — PROGRESS NOTES
Patient resting quietly in bed at this time, sleeping for the first time this shift. Bed remains low and locked. Head of bed elevated 30 degrees. Sitter remains at bedside for safety.

## 2021-03-26 NOTE — H&P (VIEW-ONLY)
[facility] Consult Note Primary GI Physician: new Referring Provider:  Dr Francesco Lynn Consult Date:  3/26/2021 Admit Date:  3/22/2021 Chief Complaint:  Feeding difficulty, eval for PEG Subjective:  
 
History of Present Illness:  Patient is a 67 y.o. female with PMH of renal cell cancer, dementia and schizophrenia, seen in consultation at the request of Dr. Francesco Lynn for evaluation of feeding difficulty and need for PEG. She was admitted 3/22 by the hospitalist service after presenting to the ED with worsening confusion and agitation. She was found to have a UTI and has been treated with antibx. She has had poor po intake and evaluation by speech therapy reveals oral dysphagia with poor acceptance of foods but without airway compromise. NG tube has been placed for nutrition. Plans are for SNF at discharge and family is interested in pursuing PEG. Patient is awake but does not communicate verbally. No family is currently at bedside. PMH: 
Renal cell cancer Dementia Schizoprenia PSH: 
Kidney surgery Allergies: 
No Known Allergies Home Medications: 
Prior to Admission medications Medication Sig Start Date End Date Taking? Authorizing Provider  
fluPHENAZine decanoate (PROLIXIN) 25 mg/mL injection 2.5 mg by IntraMUSCular route Once every 2 weeks. Patient family and psychiatrist paperwork state Prolixin 2.5 mg/ml IM every two weeks. Indications: schizophrenia   Yes Provider, Historical  
deutetrabenazine (Austedo) 12 mg tab Take 12 mg by mouth daily. Indications: tardive dyskinesia, a disorder characterized by involuntary movements of the face, mouth and tongue    Provider, Historical  
 
 
Hospital Medications: 
Current Facility-Administered Medications Medication Dose Route Frequency  potassium chloride 20 mEq in 100 ml IVPB  20 mEq IntraVENous Q2H  
 NUTRITIONAL SUPPORT ELECTROLYTE PRN ORDERS   Does Not Apply PRN  
 HYDROcodone-acetaminophen (NORCO) 5-325 mg per tablet 1 Tab  1 Tab Oral Q4H PRN  
 morphine injection 1 mg  1 mg IntraVENous Q4H PRN  
 dextrose 5% infusion  75 mL/hr IntraVENous CONTINUOUS  
 sodium chloride (NS) flush 5-40 mL  5-40 mL IntraVENous Q8H  
 sodium chloride (NS) flush 5-40 mL  5-40 mL IntraVENous PRN  
 acetaminophen (TYLENOL) tablet 650 mg  650 mg Oral Q6H PRN Or  
 acetaminophen (TYLENOL) suppository 650 mg  650 mg Rectal Q6H PRN  polyethylene glycol (MIRALAX) packet 17 g  17 g Oral DAILY PRN  promethazine (PHENERGAN) tablet 12.5 mg  12.5 mg Oral Q6H PRN Or  
 ondansetron (ZOFRAN) injection 4 mg  4 mg IntraVENous Q6H PRN  
 enoxaparin (LOVENOX) injection 40 mg  40 mg SubCUTAneous DAILY  cefTRIAXone (ROCEPHIN) 1 g in 0.9% sodium chloride (MBP/ADV) 50 mL MBP  1 g IntraVENous Q24H  hydrALAZINE (APRESOLINE) 20 mg/mL injection 20 mg  20 mg IntraVENous Q6H PRN Social History: 
Social History Tobacco Use  Smoking status: Not on file Substance Use Topics  Alcohol use: Not on file Family History: No family history on file. Review of Systems: A detailed 10 system ROS is obtained through chart review as patient does not communicate. Diet:  NG tube Objective:  
 
Physical Exam: 
Vitals: 
Visit Vitals /85 Pulse 92 Temp 97.9 °F (36.6 °C) Resp 19 Ht 5' 4\" (1.626 m) Wt 52.7 kg (116 lb 1.6 oz) SpO2 95% BMI 19.93 kg/m² Gen:  Pt is awake, cooperative, no acute distress Skin:  Extremities and face reveal no rashes. HEENT: Sclerae anicteric. Extra-occular muscles are intact. No oral ulcers. No abnormal pigmentation of the lips. The neck is supple. Cardiovascular: Regular rate and rhythm. No murmurs, gallops, or rubs. Respiratory:  Comfortable breathing with no accessory muscle use. Clear breath sounds anteriorly with no wheezes, rales, or rhonchi. GI:  Abdomen nondistended, soft, and nontender. Normal active bowel sounds.  No enlargement of the liver or spleen. No masses palpable. Rectal:  Deferred Musculoskeletal:  No pitting edema of the lower legs. Neurological: Awake, non-communicative. Laboratory:   
Recent Labs  
  03/26/21 
3633 03/25/21 
0547 03/24/21 
0614 WBC  --  4.5 4.8 HGB  --  11.5* 12.2 HCT  --  35.6* 37.7 PLT  --  165 176 MCV  --  94.4 92.2  141 143  
K 3.2* 3.1* 2.9*  
* 108* 110* CO2 26 25 26 BUN 9 11 11 CREA 1.19* 1.30* 1.31* CA 9.2 9.2 9.6 MG 1.8  --   --   
* 93 126* Assessment:  
 
Principal Problem: 
  Acute metabolic encephalopathy (4/84/0318) Active Problems: 
  Schizophrenia ([facility] [address] (3/24/2021) Dementia ([facility] [address] (3/24/2021) Severe protein-calorie malnutrition ([facility] [address] (3/25/2021) Plan:  
 
68 yo female is seen in consultation for evaluation of oral dysphagia with request for PEG placement. She was admitted 3/22 by the hospitalist service for confusion and agitation, found to have UTI. Labs reveal normal WBC, normocytic anemia, with mild elevation in creatinine and albumin at 3.8. She has been poorly accepting of oral nutrition and evaluation by speech therapy reveals no evidence of aspiration but minimal acceptance of oral intake. NG tube has been placed for nutrition which she has tolerated over the last 2 days. There are plans for SNF at discharge and family has requested PEG for nutrition. 1.  Continue NG tube feeds through weekend 2. Will make NPO at midnight Sunday for reassessment for potential PEG placement on Monday 3. Have attempted to contact family (son Bhavani Marrero) to discuss procedure without success 4. Continued K+ replacement per admitting service Patient is seen and examined in collaboration with Dr. Jeanette Cary.  Assessment and plan as per Dr. Fernanda Mueller NP

## 2021-03-26 NOTE — PROGRESS NOTES
ACUTE OT GOALS:  (Developed with and agreed upon by patient and/or caregiver.)    1. Patient will bathe and dress total body with moderate assistance and adaptive device as needed. 2. Patient will toilet with maximal assistance and adaptive device as needed. 3. Patient will tolerate 30 minutes of OT treatment with up to 2 rest breaks to increase activity tolerance for ADLs. 4. Patient will complete functional mobility for ADLs with supervision and adaptive device as needed. 5. Patient will demonstrate improved cognition for ADLs by completing functional tasks with up to moderate cueing from therapist.     Timeframe: 7 visits  OCCUPATIONAL THERAPY: Daily Note OT Treatment Day # 3    Herbert Rodriguez is a 67 y.o. female   PRIMARY DIAGNOSIS: Acute metabolic encephalopathy  Acute metabolic encephalopathy [R61.40]       [place] / Plan[place] ONLY / Product Type: Medicare /   ASSESSMENT:     REHAB RECOMMENDATIONS: CURRENT LEVEL OF FUNCTION:  (Most Recently Demonstrated)   Recommendation to date pending progress:  Setting:   Short-term Rehab  Equipment:    To Be Determined Bathing:   Not tested  Dressing:   Not tested  Feeding/Grooming:   Not tested  Toileting:   Not tested  Functional Mobility:   Minimal Assistance x 2     ASSESSMENT:  Ms. Renetta Toledo is doing fair today. Non-verbal today. Pt is requiring min assist x2 for bed mobility and mobility in hallway. Pt demonstrates fair (occasional) balance during mobility. Pt left up in chair with mitts in place and sitter in room. Pt making minimal progress at this time. Will continue to benefit from skilled OT during stay.      SUBJECTIVE:   Ms. Renetta Toledo states, Non-Verbal    SOCIAL HISTORY/LIVING ENVIRONMENT:   Home Environment: Private residence  One/Two Story Residence: Two story  Living Alone: No  Support Systems: Child(segundo)    OBJECTIVE:     PAIN: VITAL SIGNS: LINES/DRAINS:   Pre Treatment: Pain Screen  Pain Scale 1: Numeric (0 - 10)  Pain Intensity 1: 0  Post Treatment: 0   IV  O2 Device: Room air     ACTIVITIES OF DAILY LIVING: I Mod I S SBA CGA Min Mod Max Total NT Comments   BASIC ADLs:              Bathing/ Showering [] [] [] [] [] [] [] [] [] [x]    Toileting [] [] [] [] [] [] [] [] [] [x]    Dressing [] [] [] [] [] [] [] [] [] [x]    Feeding [] [] [] [] [] [] [] [] [] [x]    Grooming [] [] [] [] [] [] [] [] [] [x]    Personal Device Care [] [] [] [] [] [] [] [] [] [x]    Functional Mobility [] [] [] [] [] [x] [] [] [] [] x2   I=Independent, Mod I=Modified Independent, S=Supervision, SBA=Standby Assistance, CGA=Contact Guard Assistance,   Min=Minimal Assistance, Mod=Moderate Assistance, Max=Maximal Assistance, Total=Total Assistance, NT=Not Tested    MOBILITY: I Mod I S SBA CGA Min Mod Max Total  NT x2 Comments:   Supine to sit [] [] [] [] [] [x] [] [] [] [] []    Sit to supine [] [] [] [] [] [x] [] [] [] [] [x]    Sit to stand [] [] [] [] [] [x] [] [] [] [] [x]    Bed to chair [] [] [] [] [] [] [] [] [] [x] []    I=Independent, Mod I=Modified Independent, S=Supervision, SBA=Standby Assistance, CGA=Contact Guard Assistance,   Min=Minimal Assistance, Mod=Moderate Assistance, Max=Maximal Assistance, Total=Total Assistance, NT=Not Tested    BALANCE: Good Fair+ Fair Fair- Poor NT Comments   Sitting Static [] [] [] [x] [] []    Sitting Dynamic [] [] [] [x] [] []              Standing Static [] [] [] [x] [] []    Standing Dynamic [] [] [] [x] [] []      PLAN:   FREQUENCY/DURATION: OT Plan of Care: 3 times/week for duration of hospital stay or until stated goals are met, whichever comes first.    TREATMENT:   TREATMENT:   ( $$ Therapeutic Activity: 23-37 mins   )  Therapeutic Activity (23 Minutes): Therapeutic activity included Supine to Sit, Sit to Supine, Scooting, Ambulation on level ground, Sitting balance  and Standing balance to improve functional Mobility, Strength and Activity tolerance.     TREATMENT GRID:  N/A    AFTER TREATMENT POSITION/PRECAUTIONS:  Chair, Needs within reach and mitts and lap belt    INTERDISCIPLINARY COLLABORATION:  RN/PCT, PT/PTA and OT/HUERTAS    TOTAL TREATMENT DURATION:  OT Patient Time In/Time Out  Time In: 1425  Time Out: 52650 Mike Hassan,Sammy [address]    Corinne Vela

## 2021-03-26 NOTE — PROGRESS NOTES
Patient in bed, sitter present. Patient confused at times but cooperative. Patient VS stable on room air. Morning medications given and assessment complete will continue to monitor.

## 2021-03-26 NOTE — PROGRESS NOTES
KUB to confirm feeding tube placement has resulted: \"the distal portion of the feeding tube is looped in the distal stomach or duodenal bulb. No free air is identified. \" Pulled NG tube back to 70cm to prevent coiling/looping in the stomach. KUB will be repeated to confirm placement, as initial KUB showed feeding tube, with insertion marked at 70cm, at the pylorus.

## 2021-03-26 NOTE — PROGRESS NOTES
Patient very restless at this time. Patient's granddaughter and safety sitter are at the bedside. Patient pulling at mitts, requiring frequent redirection. Patient sitting up in bed at this time, with her legs crossed, frequently leaning into the siderail saying \"get me out\". Reoriented patient and redirected her. Patient remains sitting up in bed and restless at this time. Safety sitter remains at bedside.

## 2021-03-26 NOTE — CONSULTS
[facility] Consult Note       Primary GI Physician: new    Referring Provider:  Dr Idalia Benavides Date:  3/26/2021    Admit Date:  3/22/2021    Chief Complaint:  Feeding difficulty, eval for PEG    Subjective:     History of Present Illness:  Patient is a 67 y.o. female with PMH of renal cell cancer, dementia and schizophrenia, seen in consultation at the request of Dr. Yovana Duran for evaluation of feeding difficulty and need for PEG. She was admitted 3/22 by the hospitalist service after presenting to the ED with worsening confusion and agitation. She was found to have a UTI and has been treated with antibx. She has had poor po intake and evaluation by speech therapy reveals oral dysphagia with poor acceptance of foods but without airway compromise. NG tube has been placed for nutrition. Plans are for SNF at discharge and family is interested in pursuing PEG. Patient is awake but does not communicate verbally. No family is currently at bedside. PMH:  Renal cell cancer  Dementia  Schizoprenia    PSH:  Kidney surgery    Allergies:  No Known Allergies    Home Medications:  Prior to Admission medications    Medication Sig Start Date End Date Taking? Authorizing Provider   fluPHENAZine decanoate (PROLIXIN) 25 mg/mL injection 2.5 mg by IntraMUSCular route Once every 2 weeks. Patient family and psychiatrist paperwork state Prolixin 2.5 mg/ml IM every two weeks. Indications: schizophrenia   Yes Provider, Historical   deutetrabenazine (Austedo) 12 mg tab Take 12 mg by mouth daily.  Indications: tardive dyskinesia, a disorder characterized by involuntary movements of the face, mouth and tongue    Provider, Historical       Hospital Medications:  Current Facility-Administered Medications   Medication Dose Route Frequency    potassium chloride 20 mEq in 100 ml IVPB  20 mEq IntraVENous Q2H    NUTRITIONAL SUPPORT ELECTROLYTE PRN ORDERS   Does Not Apply PRN    HYDROcodone-acetaminophen (NORCO) 5-325 mg per tablet 1 Tab  1 Tab Oral Q4H PRN    morphine injection 1 mg  1 mg IntraVENous Q4H PRN    dextrose 5% infusion  75 mL/hr IntraVENous CONTINUOUS    sodium chloride (NS) flush 5-40 mL  5-40 mL IntraVENous Q8H    sodium chloride (NS) flush 5-40 mL  5-40 mL IntraVENous PRN    acetaminophen (TYLENOL) tablet 650 mg  650 mg Oral Q6H PRN    Or    acetaminophen (TYLENOL) suppository 650 mg  650 mg Rectal Q6H PRN    polyethylene glycol (MIRALAX) packet 17 g  17 g Oral DAILY PRN    promethazine (PHENERGAN) tablet 12.5 mg  12.5 mg Oral Q6H PRN    Or    ondansetron (ZOFRAN) injection 4 mg  4 mg IntraVENous Q6H PRN    enoxaparin (LOVENOX) injection 40 mg  40 mg SubCUTAneous DAILY    cefTRIAXone (ROCEPHIN) 1 g in 0.9% sodium chloride (MBP/ADV) 50 mL MBP  1 g IntraVENous Q24H    hydrALAZINE (APRESOLINE) 20 mg/mL injection 20 mg  20 mg IntraVENous Q6H PRN       Social History:  Social History     Tobacco Use    Smoking status: Not on file   Substance Use Topics    Alcohol use: Not on file       Family History:  No family history on file. Review of Systems:  A detailed 10 system ROS is obtained through chart review as patient does not communicate. Diet:  NG tube    Objective:     Physical Exam:  Vitals:  Visit Vitals  /85   Pulse 92   Temp 97.9 °F (36.6 °C)   Resp 19   Ht 5' 4\" (1.626 m)   Wt 52.7 kg (116 lb 1.6 oz)   SpO2 95%   BMI 19.93 kg/m²     Gen:  Pt is awake, cooperative, no acute distress  Skin:  Extremities and face reveal no rashes. HEENT: Sclerae anicteric. Extra-occular muscles are intact. No oral ulcers. No abnormal pigmentation of the lips. The neck is supple. Cardiovascular: Regular rate and rhythm. No murmurs, gallops, or rubs. Respiratory:  Comfortable breathing with no accessory muscle use. Clear breath sounds anteriorly with no wheezes, rales, or rhonchi. GI:  Abdomen nondistended, soft, and nontender. Normal active bowel sounds.  No enlargement of the liver or spleen. No masses palpable. Rectal:  Deferred  Musculoskeletal:  No pitting edema of the lower legs. Neurological: Awake, non-communicative. Laboratory:    Recent Labs     03/26/21  0616 03/25/21  0547 03/24/21  0724   WBC  --  4.5 4.8   HGB  --  11.5* 12.2   HCT  --  35.6* 37.7   PLT  --  165 176   MCV  --  94.4 92.2    141 143   K 3.2* 3.1* 2.9*   * 108* 110*   CO2 26 25 26   BUN 9 11 11   CREA 1.19* 1.30* 1.31*   CA 9.2 9.2 9.6   MG 1.8  --   --    * 93 126*          Assessment:     Principal Problem:    Acute metabolic encephalopathy (8/69/4832)    Active Problems:    Schizophrenia ([facility] [address] (3/24/2021)      Dementia ([facility] [address] (3/24/2021)      Severe protein-calorie malnutrition ([facility] [address] (3/25/2021)        Plan:     68 yo female is seen in consultation for evaluation of oral dysphagia with request for PEG placement. She was admitted 3/22 by the hospitalist service for confusion and agitation, found to have UTI. Labs reveal normal WBC, normocytic anemia, with mild elevation in creatinine and albumin at 3.8. She has been poorly accepting of oral nutrition and evaluation by speech therapy reveals no evidence of aspiration but minimal acceptance of oral intake. NG tube has been placed for nutrition which she has tolerated over the last 2 days. There are plans for SNF at discharge and family has requested PEG for nutrition. 1.  Continue NG tube feeds through weekend  2. Will make NPO at midnight Sunday for reassessment for potential PEG placement on Monday  3. Have attempted to contact family (son Ronan Wooten) to discuss procedure without success  4.  Continued K+ replacement per admitting service    Patient is seen and examined in collaboration with Dr. Monik Bonilla.  Assessment and plan as per Dr. Angelika Wagner NP

## 2021-03-26 NOTE — PROGRESS NOTES
Patient remains very restless. Patient frequently leaning over bedrails, biting at mitts, and \"swatting\" at her feeding tube. At one point, patient sat up in the bed on all fours with her head toward the siderail and would not follow redirecting by this RN or sitter. Tube feed was paused at that time due to concerns for aspiration, but has since been resumed once RN was able to get patient to lie down in bed. Patient remains restless at this time, requiring constant redirection. Safety sitter remains at bedside.

## 2021-03-26 NOTE — PROGRESS NOTES
Patient is very restless at this time. She is pulling off her mitts, pulling at NG tube even with mitts on and also pulling at her IV tubing. Constant attempts to redirect patient are unsuccessful, as patient will follow directions given but immediately go back to pulling off mitts, trying to pull at NG tube, etc. This RN and nursing assistant alternated pushing patient in wheelchair around the unit to change scenery for her, and also had patient sit with nursing staff at nurses station. Patient remained restless, frequently sitting up on her knees and biting on her mitts. However, when mitts are removed in an attempt to see if patient will be more calm without them, she immediately brings her hands up to her NG tube. Patient stated \"take this out\". Educated patient about purpose and importance of NG tube. No reply from patient. Patient then stated \"get me out\". Explained to patient that it is not safe to leave the mitts off at this time due to her pulling at necessary lines. Reminded patient that she is in the hospital. Due to her history of dementia and encephalopathy on admission, patient has limited understanding and requires constant reorientation and redirection. Notified Dr. Jono Hurley at [address] [place] [address] [phone] via [employer] of patient's restlessness and pulling at lines despite sitter at bedside and attempts to redirect patient. Dr. Daiana Sibley ordered a one-time dose of geodon 10mg IM. Will administer as ordered.

## 2021-03-26 NOTE — PROGRESS NOTES
Patient in bed, VS stable on RA. Family and sitter at bedside. SBAR report given to receiving nurse.

## 2021-03-26 NOTE — PROGRESS NOTES
KUB to confirm feeding tube placement resulted: \"feeding tube tip is at the pylorus. Length is adequate to reach the distal duodenum. \" Notified Dr. Cassidy Fu at [phone] via [employer] of the above stated. Dr. Cassidy Fu advised at [phone] via [employer] not to use the feeding tube yet and that the feeding tube \"should be advanced so that it reaches the duodenum\". Dobhoff advanced from 70cm to 75cm and secured with bridle. KUB ordered to check placement.

## 2021-03-26 NOTE — PROGRESS NOTES
LTG: Patient will tolerate least restrictive diet without overt signs or symptoms of airway compromise. STG: Patient will tolerate thin liquids without overt signs or symptoms of airway compromise. STG: Patient will tolerate ongoing po trials in efforts to advance diet. STG: Patient will participate in modified barium swallow study as clinically indicated. SPEECH LANGUAGE PATHOLOGY: DYSPHAGIA- Daily Note 3  NAME/AGE/GENDER: Low Ann is a 67 y.o. female  DATE: 3/26/2021  PRIMARY DIAGNOSIS: Acute metabolic encephalopathy [S05.55]      ICD-10: Treatment Diagnosis: R13.12 Dysphagia, Oropharyngeal Phase    RECOMMENDATIONS   DIET: WITH 1:1 ASSSITANCE   Thin Liquids by spoon    NGT in place     MEDICATIONS: Non-oral     ASPIRATION PRECAUTIONS  · Slow rate of intake  · Small bites/sips  · Upright at 90 degrees during meal     COMPENSATORY STRATEGIES/MODIFICATIONS  · Liquids by spoon when awake/alert/upright and actively accepting  · 1:1 assistance  · Cues to swallow      RECOMMENDATIONS for CONTINUED SPEECH THERAPY:   YES: Anticipate need for ongoing speech therapy during this hospitalization and at next level of care. ASSESSMENT   Patient continues to demonstrate oral dysphagia and poor acceptance of trials. Only accepted a few tsp of thin liquid. Recommend liquids only/thin liquids via spoon for pleasure with staff. Needs 1:1 assistance with oral intake. Will follow up x1 as dysphagia complicated by dementia and patient unable to follow commands to functionally participate. No progress has been made toward diet advancement thus far. Given the research, concerns of the benefit/outcomes of alternate means of nutrition in this situation; however, per family request, patient now has short term alternate means of nutrition.      EDUCATION:  · Recommendations discussed with Patient and RNRD   CONTINUATION OF SKILLED SERVICES/MEDICAL NECESSITY:   Patient is expected to demonstrate progress in  swallow strength, swallow timeliness, swallow function, diet tolerance and swallow safety in order to  improve swallow safety, work toward diet advancement and decrease aspiration risk.  Patient continues to require skilled intervention due to dysphagia. REHABILITATION POTENTIAL FOR STATED GOALS: Good    PLAN    FREQUENCY/DURATION: Continue to follow patient 3 times a week for duration of hospital stay to address above goals. - Recommendations for next treatment session: Next treatment session will address diet tolerance and po trials    SUBJECTIVE   Patient restless in bed with sitter. Mitts in place. Occasional spontaneous utterance \"I ate\"  Staff reports minimal oral intake. NGT now in place. Oxygen Device: room air  Pain: Pain Scale 1: Numeric (0 - 10)  Pain Intensity 1: 0  Pain Intervention(s) 1: Rest;Position    History of Present Injury/Illness: Ms. Braulio Almeida  has no past medical history on file. . She also  has no past surgical history on file. PRECAUTIONS/ALLERGIES: Patient has no known allergies. Problem List:  (Impairments causing functional limitations):  1. Oropharyngeal dysphagia     Prior dysphagia: Per chart, it appears decline in intake has been over the past several months. Orientation: no attempts to answer     OBJECTIVE   Dysphagia treatment-po trials/tolerance  Patient consumed thin liquid by tsp x4 trials. Patient with mild oral holding prior to swallow, but adequate containment. Patient turning head away after a few sips by spoon and would not drink from straw. Patient moving away from SLP presenting spoon in bed and appeared uninterested in additional trials.         Tool Used: Dysphagia Outcome and Severity Scale (WILY)    Score Comments   Normal Diet  [] 7 With no strategies or extra time needed   Functional Swallow  [] 6 May have mild oral or pharyngeal delay   Mild Dysphagia  [] 5 Which may require one diet consistency restricted    Mild-Moderate Dysphagia  [] 4 With 1-2 diet consistencies restricted   Moderate Dysphagia  [] 3 With 2 or more diet consistencies restricted   Moderate-Severe Dysphagia  [] 2 With partial PO strategies (trials with ST only)   Severe Dysphagia  [] 1 With inability to tolerate any PO safely      Score:  Initial: 3 Most Recent: 3 (Date 03/26/21 )   Interpretation of Tool: The Dysphagia Outcome and Severity Scale (WILY) is a simple, easy-to-use, 7-point scale developed to systematically rate the functional severity of dysphagia based on objective assessment and make recommendations for diet level, independence level, and type of nutrition.          INTERDISCIPLINARY COLLABORATION: RN, RD    After treatment position/precautions:  · Upright in bed  · RN notified   · Sitter at bedside   · Mitts in place    Total Treatment Duration:   Time In: 1297  Time Out: MOISES Macias [facility] Yury COWAN

## 2021-03-26 NOTE — PROGRESS NOTES
Administered morphine as ordered PRN for severe pain. Granddaughter at bedside reports that patient is complaining of right hip pain. Patient unable to give numerical pain score but reports pain is \"real bad\". When granddaughter asks patient if her pain is a \"1\" on 0-10 scale, patient says \"no\", but when granddaughter asks patient if her pain is a \"10\" patient says \"yea\". Will reassess pain.

## 2021-03-26 NOTE — PROGRESS NOTES
Tube feed started, as ordered. Jevity 1.5 infusing at 15mL/hr with 115mL free water flushes every 4 hours, as ordered. Tube feed rate to be titrated up by 10mL/hr every 8 hours, as patient tolerates, until goal of 45mL/hr.

## 2021-03-26 NOTE — PROGRESS NOTES
Comprehensive Nutrition Assessment    Type and Reason for Visit: Reassess  Tube Feeding Management (Palliative Care)    Nutrition Recommendations/Plan:   · Enteral Nutrition: once NGT placed and placement confirmed  · Jevity 1.5 via NGT at 15ml/hr and progress by 10ml/hour every 8 hours to goal rate of 45ml/hour. · Water flush 115ml Q4H. · At goal will provide 1485 kcal (100% estimated calorie needs), 63 grams protein (100% estimated protein needs) and 1442ml free fluid (1ml/kcal) calculations based on 22 hours infusion. · IVF:  · Per MD.   · Vitamin and Mineral Supplement Therapy:  · Electrolyte management replacement protocol active. · Replaced K with KCl 40meq IV as discussed with Dr. Simeon Zhao  · Labs:  · BMP daily, Mg and Phos MWF. Malnutrition Assessment:  Malnutrition Status: Severe malnutrition  Context: Chronic illness  Findings of clinical characteristics of malnutrition:   Energy Intake:  Mild decrease in energy intake (specify)(poor po intake reported per son)  Weight Loss:  7.0 - Greater than 7.5% over 3 months(133lbs at [facility] visit; 13% in 2 months)     Body Fat Loss:  7 - Severe body fat loss, Triceps, Orbital   Muscle Mass Loss:  1 - Mild muscle mass loss, Calf (gastrocnemius), Temples (temporalis), Thigh (quadraceps), Clavicles (pectoralis &deltoids)  Fluid Accumulation:  Unable to assess,     Strength:  Not performed     Nutrition Assessment:   Nutrition History: Unable to assess. Per SLP note on 3/24, son reports pt has had poor po intake since 3/18. Pt is taking a bite or 2 of foods such as mashed potatoes. Noted ED visit at [facility] on 1/2/21 due to cognitive decline. Per ED note, pt's son Soo Steele), stated pt was fine until December 26th and pt was unable to walk well, not eating, and unable to carry on conversations with family members. Noted pt has had a significant wt loss since office visit at [address] [place] [address] on 1/28/21.       Nutrition Background: Pt presents due to several weeks of progressive confusion and worsening agitation. PMH notable for dementia and schizophrenia. Daily Update:  Observed TF at 15ml/hr with free water flush of 115ml Q4H. D5 infusing at 75ml/hr. Pt in soft mitt restraints with no family present at time of visit. TF started at 0400 per RN note. RN reports pt is tolerating well. Noted NGT is bridled and pt has been attempting to pull at NGT per nursing notes. Pt given Geodon last night due to restlessness. Due to ongoing agitation and mentation, continue to anticipate pt to be high-risk of self-discontinuation of PEG if PEG placement is pursued. Pt did not eat this AM per sitter. SLP note today states pt only accepted a few tsp of thin liquid during assessment. Abdominal Status (last documented): Intact, Soft abdomen with Hypoactive  bowel sounds. Pertinent Medications: 40meq KCl (not received yet), 60meq KCl received 3/25, Geodon (one dose)  IVF: D5 @ 75ml/hr  Pertinent Labs:     Lab Results   Component Value Date/Time     03/26/2021 06:16 AM    K 3.2 (L) 03/26/2021 06:16 AM     (H) 03/26/2021 06:16 AM    CO2 26 03/26/2021 06:16 AM    AGAP 6 (L) 03/26/2021 06:16 AM     (H) 03/26/2021 06:16 AM    BUN 9 03/26/2021 06:16 AM    CREA 1.19 (H) 03/26/2021 06:16 AM    GFRAA 57 (L) 03/26/2021 06:16 AM    GFRNA 47 (L) 03/26/2021 06:16 AM    CA 9.2 03/26/2021 06:16 AM    MG 1.8 03/26/2021 06:16 AM    PHOS 3.2 03/26/2021 06:16 AM     Current Nutrition Therapies:  DIET CLEAR LIQUID  DIET NUTRITIONAL SUPPLEMENTS All Meals; Ensure Enlive  DIET TUBE FEEDING Please open for rate and flush. Start once NGT is placed with placement of NGT confirmed.   Current Tube Feeding (TF) Orders:   · Feeding Route: Nasogastric  · Formula: Jevity 1.5  · Schedule:Continuous    · Regimen: 15ml/hr, progress 10ml/hr Q8H to goal rate of 45ml/hr  · Additives/Modulars: (none ordered)  · Water Flushes: 115ml Q4H  · Current TF & Flush Orders Provides: 15ml/hr meeting 33% of needs  · Goal TF & Flush Orders Provides: 1485 kcal (100% estimated calorie needs), 63 grams protein (100% estimated protein needs) and 1442ml free fluid (1ml/kcal) calculations based on 22 hours infusion    Current Intake:   Average Meal Intake: 0% Average Supplement Intake: 0%      Anthropometric Measures:  Height: 5' 4\" (162.6 cm)  Current Body Wt: 52.7 kg (116 lb 2.9 oz)(3/25), Weight source: Standing scale  BMI: 19.9, Underweight (BMI less than 22) age over 72  Admission Body Weight: 115 lb 8.3 oz(standing scale; 3/24)  Ideal Body Weight (lbs) (Calculated): 120 lbs (55 kg), 96.3 %  Usual Body Wt: 60.4 kg (133 lb 2.5 oz)(1/28/21; [address] [place] [address]), Percent weight change: -13.2          Edema: No data recorded   Estimated Daily Nutrient Needs:  Energy (kcal/day): 4107-6410 (Kcal/kg(25-30), Weight Used: Current(52.4kg))  Protein (g/day): 52-63(1-1.2gm/kg) Weight Used: (Current(52.4kg))  Fluid (ml/day):   (1 ml/kcal)    Nutrition Diagnosis:   · Severe malnutrition, In context of chronic illness related to cognitive or neurological impairment, inadequate protein-energy intake as evidenced by (criteria provided in malnutrition assessment above)    · Inadequate oral intake related to cognitive or neurological impairment, swallowing difficulty as evidenced by (CLQ from spoon only; NGT for EN)    Nutrition Interventions:   Food and/or Nutrient Delivery: Continue current diet, Continue oral nutrition supplement, Continue tube feeding     Coordination of Nutrition Care: Continue to monitor while inpatient  Plan of Care discussed with BONNIE Ponce and Dr. Frankey Beau    Goals:   Previous Goal Met: Progressing toward goal(s)  Active Goal: Tolerate EN at goal rate within 5 days    Nutrition Monitoring and Evaluation:      Food/Nutrient Intake Outcomes: Diet advancement/tolerance, Food and nutrient intake, Supplement intake, Enteral nutrition intake/tolerance       Discharge Planning:     Too soon to determine    Electronically signed by Lashon Georges MS, RDN, LD 3/26/2021 at 10:46 AM  Contact: [phone]

## 2021-03-26 NOTE — PROGRESS NOTES
Progress Note    Patient: Ksenia Dean MRN: [id number]  SSN: [id number]    YOB: 1945  Age: 67 y.o. Sex: female      Admit Date: 3/22/2021    LOS: 4 days     Subjective:     \"75 y. o. female with medical h/o dementia, schizophrenia who presented to [facility] ER with report of several weeks progressive confusion and worsening agitation/confusion/ shuffling gait for past 3 days. Patient is minimally conversant so history obtained from son at bedside. She lives with her son with other family helping at times. They are overwhelmed by her needs and are concerned for her safety due to her wandering behaviors. \" They would like her to be placed into a nursing home. UTI found. Speech therapy has not cleared for regular or thick liquids due to patient pocketing her food. Spoken to family who would want NG tube feedings while on abx and then to readdress oral intake to see if will need PEG tube. Urine culture with mixed colonies. PEG tube feedings started 3/25    K 3.2. Cannot obtain ROS from patient.  Given Geodon over night   Current Facility-Administered Medications   Medication Dose Route Frequency    potassium chloride 20 mEq in 100 ml IVPB  20 mEq IntraVENous Q2H    NUTRITIONAL SUPPORT ELECTROLYTE PRN ORDERS   Does Not Apply PRN    HYDROcodone-acetaminophen (NORCO) 5-325 mg per tablet 1 Tab  1 Tab Oral Q4H PRN    morphine injection 1 mg  1 mg IntraVENous Q4H PRN    dextrose 5% infusion  75 mL/hr IntraVENous CONTINUOUS    sodium chloride (NS) flush 5-40 mL  5-40 mL IntraVENous Q8H    sodium chloride (NS) flush 5-40 mL  5-40 mL IntraVENous PRN    acetaminophen (TYLENOL) tablet 650 mg  650 mg Oral Q6H PRN    Or    acetaminophen (TYLENOL) suppository 650 mg  650 mg Rectal Q6H PRN    polyethylene glycol (MIRALAX) packet 17 g  17 g Oral DAILY PRN    promethazine (PHENERGAN) tablet 12.5 mg  12.5 mg Oral Q6H PRN    Or    ondansetron (ZOFRAN) injection 4 mg  4 mg IntraVENous Q6H PRN    enoxaparin (LOVENOX) injection 40 mg  40 mg SubCUTAneous DAILY    cefTRIAXone (ROCEPHIN) 1 g in 0.9% sodium chloride (MBP/ADV) 50 mL MBP  1 g IntraVENous Q24H    hydrALAZINE (APRESOLINE) 20 mg/mL injection 20 mg  20 mg IntraVENous Q6H PRN       Objective:     Vitals:    03/25/21 1937 03/25/21 2258 03/26/21 0719 03/26/21 1058   BP: (!) 145/70 135/72 128/69 119/85   Pulse: (!) 107 85 90 92   Resp: 20 20 19 19   Temp: 97.8 °F (36.6 °C) 97.9 °F (36.6 °C) 97.5 °F (36.4 °C) 97.9 °F (36.6 °C)   SpO2: 93% 94% 95% 95%   Weight:       Height:             Intake and Output:  Current Shift: 03/26 0701 - 03/26 1900  In: 10 [P.O.:10]  Out: -   Last three shifts: 03/24 1901 - 03/26 0700  In: 2263.8 [P.O.:160; I.V.:2103.8]  Out: 200 [Urine:200]    Physical Exam:   General:  Alert, no distress, saying short phrases. Moving around in bed. Eyes:  Conjunctivae/corneas clear. Ears:  Normal TMs and external ear canals both ears. Nose: Nares normal. Septum midline. Mouth/Throat: Poor dentition   Neck:  no JVD. Back:   Symmetric, no curvature. ROM normal. No CVA tenderness. Lungs:   Clear to auscultation bilaterally. Heart:  Regular rate and rhythm, S1, S2 normal   Abdomen:   Soft, non-tender. Bowel sounds normal.    Extremities: Extremities normal, atraumatic, no cyanosis or edema. Pulses: 2+ and symmetric all extremities. Skin: Skin color, texture, turgor normal. No rashes or lesions   Lymph nodes: Cervical, supraclavicular, and axillary nodes normal.   Neurologic:  No noted outbursts.         Lab/Data Review:    Recent Results (from the past 24 hour(s))   METABOLIC PANEL, BASIC    Collection Time: 03/26/21  6:16 AM   Result Value Ref Range    Sodium 140 136 - 145 mmol/L    Potassium 3.2 (L) 3.5 - 5.1 mmol/L    Chloride 108 (H) 98 - 107 mmol/L    CO2 26 21 - 32 mmol/L    Anion gap 6 (L) 7 - 16 mmol/L    Glucose 119 (H) 65 - 100 mg/dL    BUN 9 8 - 23 MG/DL    Creatinine 1.19 (H) 0.6 - 1.0 MG/DL    GFR est AA 57 (L) >60 ml/min/1.73m2    GFR est non-AA 47 (L) >60 ml/min/1.73m2    Calcium 9.2 8.3 - 10.4 MG/DL   PHOSPHORUS    Collection Time: 03/26/21  6:16 AM   Result Value Ref Range    Phosphorus 3.2 2.3 - 3.7 MG/DL   MAGNESIUM    Collection Time: 03/26/21  6:16 AM   Result Value Ref Range    Magnesium 1.8 1.8 - 2.4 mg/dL       Assessment/ Plan:     Principal Problem:    Acute metabolic encephalopathy (8/37/3619)    Active Problems:    Schizophrenia ([facility] (3/24/2021)      Dementia ([facility] (3/24/2021)      Severe protein-calorie malnutrition ([facility] (3/25/2021)    Per son, seems to be near her baseline. Has been declining since December of last year. Family wanting nursing home. UTI- Ceftriaxone 3/23-3/27    Hypokalemia - Supplement      Schizophrenia - tele psych recommended austedo 12mg daily. We do not carry, son will try to bring in. Discussed poor oral intake. NG tube for now, but family state they would want PEG tube for nutrition since eating very little and poor PO intake getting worse since December. Will consult GI to see if they can place PEG tube. Looking into nursing homes. Spoken to her son, Sheryl Yuan. Family wants PEG tube.      DVT prophylaxis - Lovenox  Signed By: Bigg Rubin,      March 26, 2021

## 2021-03-26 NOTE — PROGRESS NOTES
KUB to confirm feeding tube placement has resulted: \"the distal portion of the feeding tube remains looped in the distal stomach or duodenal bulb, not significantly changed from prior. No free air is identified. \" Notified Dr. Ryder Finch at [address] via [email or web address] of the above stated. Also informed Dr. Scottie Alcaraz that the feeding tube was advanced as advised by Dr. Rahul Kaiser earlier in the shift. This RN requested that Dr. Scottie Alcaraz review the actual KUB image, as this RN needs to ensure proper placement prior to starting tube feeds. Dr. Scottie Alcaraz informed this RN at [phone] via [email or web address] that it is okay to start the tube feeding.

## 2021-03-26 NOTE — PROGRESS NOTES
BIN CM:  Family has decided to have PEG placed which has been scheduled for Monday. Patient will be discharged to SNF when medically stable. Case Management will continue to follow.

## 2021-03-26 NOTE — PROGRESS NOTES
Bedside shift report completed with oncoming nurse, April. Patient awake in bed and restless at this time. Respirations even and unlabored on room air. Bed remains low and locked. Bedside table, personal belongings and call light within reach. Sitter remains at bedside for safety. Jevity 1.5 infusing as ordered via feeding tube, at 15mL/hr with 115mL free water flushes every 4 hours.

## 2021-03-27 LAB
ANION GAP SERPL CALC-SCNC: 7 MMOL/L (ref 7–16)
BACTERIA SPEC CULT: NORMAL
BACTERIA SPEC CULT: NORMAL
BUN SERPL-MCNC: 7 MG/DL (ref 8–23)
CALCIUM SERPL-MCNC: 9.3 MG/DL (ref 8.3–10.4)
CHLORIDE SERPL-SCNC: 109 MMOL/L (ref 98–107)
CO2 SERPL-SCNC: 24 MMOL/L (ref 21–32)
CREAT SERPL-MCNC: 0.96 MG/DL (ref 0.6–1)
GLUCOSE SERPL-MCNC: 101 MG/DL (ref 65–100)
POTASSIUM SERPL-SCNC: 4 MMOL/L (ref 3.5–5.1)
SERVICE CMNT-IMP: NORMAL
SERVICE CMNT-IMP: NORMAL
SODIUM SERPL-SCNC: 140 MMOL/L (ref 136–145)

## 2021-03-27 PROCEDURE — 74011250636 HC RX REV CODE- 250/636: Performed by: FAMILY MEDICINE

## 2021-03-27 PROCEDURE — 65270000029 HC RM PRIVATE

## 2021-03-27 PROCEDURE — 77030018798 HC PMP KT ENTRL FED COVD -A

## 2021-03-27 PROCEDURE — 36415 COLL VENOUS BLD VENIPUNCTURE: CPT

## 2021-03-27 PROCEDURE — 74011250636 HC RX REV CODE- 250/636: Performed by: INTERNAL MEDICINE

## 2021-03-27 PROCEDURE — 74011000258 HC RX REV CODE- 258: Performed by: INTERNAL MEDICINE

## 2021-03-27 PROCEDURE — 80048 BASIC METABOLIC PNL TOTAL CA: CPT

## 2021-03-27 PROCEDURE — 2709999900 HC NON-CHARGEABLE SUPPLY

## 2021-03-27 RX ADMIN — MORPHINE SULFATE 1 MG: 2 INJECTION, SOLUTION INTRAMUSCULAR; INTRAVENOUS at 12:18

## 2021-03-27 RX ADMIN — Medication 10 ML: at 13:52

## 2021-03-27 RX ADMIN — ENOXAPARIN SODIUM 40 MG: 40 INJECTION SUBCUTANEOUS at 10:51

## 2021-03-27 RX ADMIN — Medication 10 ML: at 06:06

## 2021-03-27 RX ADMIN — DEXTROSE MONOHYDRATE 75 ML/HR: 5 INJECTION, SOLUTION INTRAVENOUS at 16:45

## 2021-03-27 RX ADMIN — DEXTROSE MONOHYDRATE 75 ML/HR: 5 INJECTION, SOLUTION INTRAVENOUS at 03:35

## 2021-03-27 RX ADMIN — CEFTRIAXONE SODIUM 1 G: 1 INJECTION, POWDER, FOR SOLUTION INTRAMUSCULAR; INTRAVENOUS at 13:35

## 2021-03-27 NOTE — PROGRESS NOTES
Patient in bed resting, D5 running, tube feeds running. VS stable on room air. Patient in no apparent distress. Bed in lowest locked position. Will continue to monitor.

## 2021-03-27 NOTE — PROGRESS NOTES
Patient has been much more restful throughout this shift compared to previous night. Patient remains confused but more easily redirected. Patient was restless off and on at the start of the shift and again starting around 0400 but fell asleep around 0650 again this morning. Overall, patient had a much better night than previous nights.

## 2021-03-27 NOTE — PROGRESS NOTES
Jevity 1.5 tube feed rate increased from 25mL/hr to 35mL/hr as ordered. Gastric residual 0mL. Patient tolerating tube feed well.

## 2021-03-27 NOTE — PROGRESS NOTES
Progress Note    Patient: Wes Pyle MRN: [id number]  SSN: [id number]    YOB: 1945  Age: 67 y.o. Sex: female      Admit Date: 3/22/2021    LOS: 5 days     Subjective:     \"75 y. o. female with medical h/o dementia, schizophrenia who presented to [facility] ER with report of several weeks progressive confusion and worsening agitation/confusion/ shuffling gait for past 3 days. Patient is minimally conversant so history obtained from son at bedside. She lives with her son with other family helping at times. They are overwhelmed by her needs and are concerned for her safety due to her wandering behaviors. \" They would like her to be placed into a nursing home. UTI found. Speech therapy has not cleared for regular or thick liquids due to patient pocketing her food. Spoken to family who want PEG tube. Urine culture with mixed colonies. NG tube feedings started 3/25     Cannot obtain ROS from patient. Son brought psych meds and now no noted outbursts.    Current Facility-Administered Medications   Medication Dose Route Frequency    deutetrabenazine tab 12 mg (Patient Supplied)  12 mg Oral BID    NUTRITIONAL SUPPORT ELECTROLYTE PRN ORDERS   Does Not Apply PRN    HYDROcodone-acetaminophen (NORCO) 5-325 mg per tablet 1 Tab  1 Tab Oral Q4H PRN    morphine injection 1 mg  1 mg IntraVENous Q4H PRN    dextrose 5% infusion  75 mL/hr IntraVENous CONTINUOUS    sodium chloride (NS) flush 5-40 mL  5-40 mL IntraVENous Q8H    sodium chloride (NS) flush 5-40 mL  5-40 mL IntraVENous PRN    acetaminophen (TYLENOL) tablet 650 mg  650 mg Oral Q6H PRN    Or    acetaminophen (TYLENOL) suppository 650 mg  650 mg Rectal Q6H PRN    polyethylene glycol (MIRALAX) packet 17 g  17 g Oral DAILY PRN    promethazine (PHENERGAN) tablet 12.5 mg  12.5 mg Oral Q6H PRN    Or    ondansetron (ZOFRAN) injection 4 mg  4 mg IntraVENous Q6H PRN    enoxaparin (LOVENOX) injection 40 mg  40 mg SubCUTAneous DAILY    cefTRIAXone (ROCEPHIN) 1 g in 0.9% sodium chloride (MBP/ADV) 50 mL MBP  1 g IntraVENous Q24H    hydrALAZINE (APRESOLINE) 20 mg/mL injection 20 mg  20 mg IntraVENous Q6H PRN       Objective:     Vitals:    03/26/21 1058 03/26/21 1457 03/27/21 0356 03/27/21 1100   BP: 119/85 (!) 143/88 136/85 127/77   Pulse: 92 97 92 88   Resp: 19 19 20 19   Temp: 97.9 °F (36.6 °C) 98 °F (36.7 °C) 97.4 °F (36.3 °C) 98 °F (36.7 °C)   SpO2: 95% 94% 95% 95%   Weight:       Height:             Intake and Output:  Current Shift: 03/27 0701 - 03/27 1900  In: 165   Out: -   Last three shifts: 03/25 1901 - 03/27 0700  In: 3300 [P.O.:10; I.V.:1375]  Out: 200 [Urine:200]    Physical Exam:   General:  Alert, goes back to sleep, not speaking   Eyes:  Conjunctivae/corneas clear. Ears:  Normal TMs and external ear canals both ears. Nose: Nares normal. Septum midline. Mouth/Throat: Poor dentition   Neck:  no JVD. Back:   Symmetric, no curvature. ROM normal. No CVA tenderness. Lungs:   Clear to auscultation bilaterally. Heart:  Regular rate and rhythm, S1, S2 normal   Abdomen:   Soft, non-tender. Bowel sounds normal.    Extremities: Muscle wasting, limited ROM   Pulses: 2+ and symmetric all extremities. Skin: Skin color, texture, turgor normal. No rashes or lesions   Lymph nodes: Cervical, supraclavicular, and axillary nodes normal.   Neurologic:  No noted outbursts.         Lab/Data Review:    Recent Results (from the past 24 hour(s))   METABOLIC PANEL, BASIC    Collection Time: 03/27/21  6:12 AM   Result Value Ref Range    Sodium 140 136 - 145 mmol/L    Potassium 4.0 3.5 - 5.1 mmol/L    Chloride 109 (H) 98 - 107 mmol/L    CO2 24 21 - 32 mmol/L    Anion gap 7 7 - 16 mmol/L    Glucose 101 (H) 65 - 100 mg/dL    BUN 7 (L) 8 - 23 MG/DL    Creatinine 0.96 0.6 - 1.0 MG/DL    GFR est AA >60 >60 ml/min/1.73m2    GFR est non-AA >60 >60 ml/min/1.73m2    Calcium 9.3 8.3 - 10.4 MG/DL       Assessment/ Plan:     Principal Problem:    Acute metabolic encephalopathy (3/22/2021)    Active Problems:    Schizophrenia ([address] (3/24/2021)      Dementia ([address] (3/24/2021)      Severe protein-calorie malnutrition ([address] (3/25/2021)    Per son, seems to be near her baseline. Has been declining since December of last year. Family wanting nursing home. UTI- Ceftriaxone 3/23-3/27    Hypokalemia - Resolved. Schizophrenia -  austedo 12mg daily. Discussed poor oral intake. NG tube for now, but family state they would want PEG tube for nutrition since eating very little and poor PO intake getting worse since December. GI to place PEG tube Monday. Looking into nursing homes.      DVT prophylaxis - Lovenox  Signed By: Clarence Corral DO     March 27, 2021

## 2021-03-27 NOTE — PROGRESS NOTES
Patient restless in bed at this time, frequently sitting up on all fours and turning side to side. This RN asked patient if she is hurting anywhere, to which patient replied \"no\". This RN providing redirection and reorientation to patient.

## 2021-03-27 NOTE — PROGRESS NOTES
Patient in bed resting. Patient in no apparent distress. Tube feeds running. Bed in lowest locked position. Patient in eye view. Preparing to give SBAR report to oncoming nurse.

## 2021-03-27 NOTE — PROGRESS NOTES
Bedside shift report complete with oncoming nurse, Noris Metzger. Patient resting quietly in bed at this time, eyes closed, respirations even and unlabored on room air. No needs stated. Bed low and locked. Bedside table, personal belongings and call light within reach.

## 2021-03-27 NOTE — PROGRESS NOTES
Patient resting quietly in bed at this time, eyes closed, respirations even and unlabored on room air. Patient is much more restful so far this shift and more easily redirected.

## 2021-03-27 NOTE — PROGRESS NOTES
Report received from [facility]. Patient resting quietly in bed. Respirations present, even and unlabored on room air. No signs of distress, no needs expressed. Bilateral soft wrist restraints present. NGT with continuous tube feeds infusing. Bed low and locked, safety measures in place. No signs of distress, no needs noted by this RN. Will continue to monitor.

## 2021-03-28 PROCEDURE — 65270000029 HC RM PRIVATE

## 2021-03-28 PROCEDURE — 74011000258 HC RX REV CODE- 258: Performed by: INTERNAL MEDICINE

## 2021-03-28 PROCEDURE — 74011250636 HC RX REV CODE- 250/636: Performed by: INTERNAL MEDICINE

## 2021-03-28 PROCEDURE — 2709999900 HC NON-CHARGEABLE SUPPLY

## 2021-03-28 RX ORDER — CEFAZOLIN SODIUM/WATER 2 G/20 ML
2 SYRINGE (ML) INTRAVENOUS
Status: COMPLETED | OUTPATIENT
Start: 2021-03-29 | End: 2021-03-29

## 2021-03-28 RX ORDER — CEFAZOLIN SODIUM/WATER 2 G/20 ML
2 SYRINGE (ML) INTRAVENOUS
Status: DISCONTINUED | OUTPATIENT
Start: 2021-03-28 | End: 2021-03-28

## 2021-03-28 RX ADMIN — Medication 10 ML: at 06:00

## 2021-03-28 RX ADMIN — ENOXAPARIN SODIUM 40 MG: 40 INJECTION SUBCUTANEOUS at 08:45

## 2021-03-28 RX ADMIN — Medication 5 ML: at 14:04

## 2021-03-28 RX ADMIN — Medication 10 ML: at 22:00

## 2021-03-28 RX ADMIN — CEFTRIAXONE SODIUM 1 G: 1 INJECTION, POWDER, FOR SOLUTION INTRAMUSCULAR; INTRAVENOUS at 14:03

## 2021-03-28 NOTE — PROGRESS NOTES
Progress Note    Patient: Catrachito Mar MRN: [id number]  SSN: [id number]    YOB: 1945  Age: 67 y.o. Sex: female      Admit Date: 3/22/2021    LOS: 6 days     Subjective:     \"75 y. o. female with medical h/o dementia, schizophrenia who presented to [facility] ER with report of several weeks progressive confusion and worsening agitation/confusion/ shuffling gait for past 3 days. Patient is minimally conversant so history obtained from son at bedside. She lives with her son with other family helping at times. They are overwhelmed by her needs and are concerned for her safety due to her wandering behaviors. \" They would like her to be placed into a nursing home. UTI found. Urine culture with mixed colonies. Speech therapy has not cleared for regular or thick liquids due to patient pocketing her food. Spoken to family who want PEG tube. NG tube feedings started 3/25. Plan for PEG tube 3/29. Has psych medications for outburst. Since obtaining her home medications, agitated much improved. Cannot obtain ROS from patient.    Current Facility-Administered Medications   Medication Dose Route Frequency    deutetrabenazine tab 12 mg (Patient Supplied)  12 mg Oral BID    NUTRITIONAL SUPPORT ELECTROLYTE PRN ORDERS   Does Not Apply PRN    HYDROcodone-acetaminophen (NORCO) 5-325 mg per tablet 1 Tab  1 Tab Oral Q4H PRN    morphine injection 1 mg  1 mg IntraVENous Q4H PRN    dextrose 5% infusion  75 mL/hr IntraVENous CONTINUOUS    sodium chloride (NS) flush 5-40 mL  5-40 mL IntraVENous Q8H    sodium chloride (NS) flush 5-40 mL  5-40 mL IntraVENous PRN    acetaminophen (TYLENOL) tablet 650 mg  650 mg Oral Q6H PRN    Or    acetaminophen (TYLENOL) suppository 650 mg  650 mg Rectal Q6H PRN    polyethylene glycol (MIRALAX) packet 17 g  17 g Oral DAILY PRN    promethazine (PHENERGAN) tablet 12.5 mg  12.5 mg Oral Q6H PRN    Or    ondansetron (ZOFRAN) injection 4 mg  4 mg IntraVENous Q6H PRN    enoxaparin (LOVENOX) injection 40 mg  40 mg SubCUTAneous DAILY    cefTRIAXone (ROCEPHIN) 1 g in 0.9% sodium chloride (MBP/ADV) 50 mL MBP  1 g IntraVENous Q24H    hydrALAZINE (APRESOLINE) 20 mg/mL injection 20 mg  20 mg IntraVENous Q6H PRN       Objective:     Vitals:    03/27/21 1445 03/27/21 1931 03/28/21 0349 03/28/21 0723   BP: 110/83 (!) 150/85 (!) 176/97 139/84   Pulse: 74 98 76 78   Resp: 19 17 18 19   Temp: 97.3 °F (36.3 °C) 98.2 °F (36.8 °C) 97.9 °F (36.6 °C) 97.6 °F (36.4 °C)   SpO2: 96% 96% 94% 97%   Weight:       Height:             Intake and Output:  Current Shift: No intake/output data recorded. Last three shifts: 03/26 1901 - 03/28 0700  In: 2606 [I.V.:200]  Out: 400     Physical Exam:   General:  Alert, will answer some questions with yes or no. Eyes:  Conjunctivae/corneas clear. Ears:  Normal TMs and external ear canals both ears. Nose: Nares normal. Septum midline. Mouth/Throat: Poor dentition   Neck:  no JVD. Back:   deferred. Lungs:   Clear to auscultation bilaterally. Heart:  Regular rate and rhythm, S1, S2 normal   Abdomen:   Soft, non-tender. Bowel sounds normal.    Extremities: Muscle wasting, limited ROM   Pulses: 2+ and symmetric all extremities. Skin: Skin color, texture, turgor normal. No rashes or lesions   Lymph nodes: Cervical, supraclavicular, and axillary nodes normal.   Neurologic:  No noted outbursts. Lab/Data Review:    No results found for this or any previous visit (from the past 24 hour(s)). Assessment/ Plan:     Principal Problem:    Acute metabolic encephalopathy (7/85/6281)    Active Problems:    Schizophrenia ([address] (3/24/2021)      Dementia ([facility] (3/24/2021)      Severe protein-calorie malnutrition ([address] (3/25/2021)    Per son, seems to be near her baseline. Has been declining since December of last year. Family wanting nursing home. UTI- Ceftriaxone 3/23-3/28    Hypokalemia - Resolved. Schizophrenia -  austedo 12mg daily.      Discussed poor oral intake. NG tube for now, but family state they would want PEG tube for nutrition since eating very little and poor PO intake getting worse since December. GI to place PEG tube Monday. Looking into nursing homes.      DVT prophylaxis - Lovenox  Signed By: Allison Henriquez, DO     March 28, 2021

## 2021-03-28 NOTE — PROGRESS NOTES
Report received from Prakash Valadez. Patient resting quietly in bed. Respirations present, even and unlabored on room air. Bed low and locked, safety measures in place. NGT in place, infusing Jevity 1.5 at 45mL/hour, with a 115 mL flush q4 hours. Bilateral soft wrist restraints noted. No signs of distress, no needs noted by this RN. Will continue to monitor.

## 2021-03-28 NOTE — PROGRESS NOTES
GI    I was informed that pt family was frustrated that they did not have a specific time that PEG placement would be completed. I have called pt sonNoam Session and discussed plans for procedure. Advised him that exact time of this procedure would be determined Monday morning once staff in GI lab is made aware of procedure. He expressed understanding. He works and was concerned that he may need to take time off. Advised pt that I would see if someone could call him with an updated time of procedure once this is determined.       Ty Carranza PA-C  [facility]

## 2021-03-28 NOTE — PROGRESS NOTES
SBAR from [facility]. Patient in stable condition with resps even/unlabored. NAD noted. Patient on room air. Restraints noted. IVF running at ordered rate. Safety measures noted. Will continue to monitor per policy.

## 2021-03-28 NOTE — PROGRESS NOTES
Patient has PEG tube insertion order with today's date and a one time dose of ancef due today. Called OR and messaged on-call GI MD. Orders should be for tomorrow. Orders modified. Contacted pharmacy and got on -jacky to OR ancef rescheduled for tomorrow. Also placed order for patient to be NPO after midnight/have tube feedings turned off at midnight in preparation for PEG Tube placement. Will get consent signed when patient's son gets here.

## 2021-03-28 NOTE — PROGRESS NOTES
Family members are very upset that nurse does not know when the procedure is scheduled and exactly how long procedure will take. This nurse has spoken with the OR and checked schedules. Nurse explained that the time taken for procedure can vary depending on condition of the patient and how well they tolerate procedure. Nurse also explained that the procedure has not yet been scheduled. Family states that this is no way to handle patient's and no one will come speak with them. Son, Riley Elmore, does state that the surgeon explained the procedure to him and states that it was before family decided whether to do the procedure. Family is upset that the surgeon who will be doing this procedure is not here today. Nurse offered to send message to GI doctor on call and see if someone would be able to speak with them. Nurse did tell them that since it is Sunday, there may not be anyone available to speak with them. Message sent and there has been no reply at this time.

## 2021-03-28 NOTE — PROGRESS NOTES
Patient remains in stable condition with respirations even/unlabored. No acute distress noted. No needs noted or voiced at this time. Safety measures in place. Patient on room air. Tube feeding in progress. Family member at bedside. Bilat wrist restraints in place/Mitts in place. Call light remains within reach. Preparing to give report to oncoming shift.

## 2021-03-28 NOTE — PROGRESS NOTES
No acute events overnight. Patient resting quietly in bed. Respirations present, even and unlabored on room air. Bed low and locked, safety measures in place. No signs of distress, no needs noted by this RN. Bilateral soft wrist restraints present. NGT with continuous tube feeds infusing at 45mL/hr with a 115mL flush q4 hours. Preparing to give report to oncoming RN.

## 2021-03-29 ENCOUNTER — ANESTHESIA EVENT (OUTPATIENT)
Dept: ENDOSCOPY | Age: 76
DRG: 689 | End: 2021-03-29
Payer: MEDICARE

## 2021-03-29 ENCOUNTER — ANESTHESIA (OUTPATIENT)
Dept: ENDOSCOPY | Age: 76
DRG: 689 | End: 2021-03-29
Payer: MEDICARE

## 2021-03-29 LAB
ANION GAP SERPL CALC-SCNC: 4 MMOL/L (ref 7–16)
BUN SERPL-MCNC: 8 MG/DL (ref 8–23)
C TRACH RRNA SPEC QL NAA+PROBE: NEGATIVE
CALCIUM SERPL-MCNC: 9.7 MG/DL (ref 8.3–10.4)
CHLORIDE SERPL-SCNC: 107 MMOL/L (ref 98–107)
CO2 SERPL-SCNC: 30 MMOL/L (ref 21–32)
CREAT SERPL-MCNC: 0.91 MG/DL (ref 0.6–1)
GLUCOSE SERPL-MCNC: 100 MG/DL (ref 65–100)
HCT VFR BLD AUTO: 36.6 % (ref 35.8–46.3)
HGB BLD-MCNC: 12.1 G/DL (ref 11.7–15.4)
MAGNESIUM SERPL-MCNC: 2.2 MG/DL (ref 1.8–2.4)
N GONORRHOEA RRNA SPEC QL NAA+PROBE: NEGATIVE
PHOSPHATE SERPL-MCNC: 2.9 MG/DL (ref 2.3–3.7)
POTASSIUM SERPL-SCNC: 4 MMOL/L (ref 3.5–5.1)
SODIUM SERPL-SCNC: 141 MMOL/L (ref 136–145)
SPECIMEN SOURCE: NORMAL
T VAGINALIS RRNA VAG QL NAA+PROBE: NEGATIVE

## 2021-03-29 PROCEDURE — 77030005122 HC CATH GASTMY PEG BSC -B: Performed by: INTERNAL MEDICINE

## 2021-03-29 PROCEDURE — 85018 HEMOGLOBIN: CPT

## 2021-03-29 PROCEDURE — 97535 SELF CARE MNGMENT TRAINING: CPT

## 2021-03-29 PROCEDURE — 2709999900 HC NON-CHARGEABLE SUPPLY

## 2021-03-29 PROCEDURE — 77030012056: Performed by: INTERNAL MEDICINE

## 2021-03-29 PROCEDURE — 76060000032 HC ANESTHESIA 0.5 TO 1 HR: Performed by: INTERNAL MEDICINE

## 2021-03-29 PROCEDURE — 74011250636 HC RX REV CODE- 250/636: Performed by: ANESTHESIOLOGY

## 2021-03-29 PROCEDURE — 36415 COLL VENOUS BLD VENIPUNCTURE: CPT

## 2021-03-29 PROCEDURE — 80048 BASIC METABOLIC PNL TOTAL CA: CPT

## 2021-03-29 PROCEDURE — 2709999900 HC NON-CHARGEABLE SUPPLY: Performed by: INTERNAL MEDICINE

## 2021-03-29 PROCEDURE — 97530 THERAPEUTIC ACTIVITIES: CPT

## 2021-03-29 PROCEDURE — 74011250636 HC RX REV CODE- 250/636: Performed by: INTERNAL MEDICINE

## 2021-03-29 PROCEDURE — 83735 ASSAY OF MAGNESIUM: CPT

## 2021-03-29 PROCEDURE — 74011000250 HC RX REV CODE- 250: Performed by: INTERNAL MEDICINE

## 2021-03-29 PROCEDURE — 74011000250 HC RX REV CODE- 250: Performed by: NURSE ANESTHETIST, CERTIFIED REGISTERED

## 2021-03-29 PROCEDURE — 74011250636 HC RX REV CODE- 250/636: Performed by: NURSE ANESTHETIST, CERTIFIED REGISTERED

## 2021-03-29 PROCEDURE — 0DH63UZ INSERTION OF FEEDING DEVICE INTO STOMACH, PERCUTANEOUS APPROACH: ICD-10-PCS | Performed by: INTERNAL MEDICINE

## 2021-03-29 PROCEDURE — 76040000025: Performed by: INTERNAL MEDICINE

## 2021-03-29 PROCEDURE — 65270000029 HC RM PRIVATE

## 2021-03-29 PROCEDURE — 84100 ASSAY OF PHOSPHORUS: CPT

## 2021-03-29 RX ORDER — PROPOFOL 10 MG/ML
INJECTION, EMULSION INTRAVENOUS AS NEEDED
Status: DISCONTINUED | OUTPATIENT
Start: 2021-03-29 | End: 2021-03-29 | Stop reason: HOSPADM

## 2021-03-29 RX ORDER — SODIUM CHLORIDE, SODIUM LACTATE, POTASSIUM CHLORIDE, CALCIUM CHLORIDE 600; 310; 30; 20 MG/100ML; MG/100ML; MG/100ML; MG/100ML
INJECTION, SOLUTION INTRAVENOUS
Status: DISCONTINUED | OUTPATIENT
Start: 2021-03-29 | End: 2021-03-29 | Stop reason: HOSPADM

## 2021-03-29 RX ORDER — SODIUM CHLORIDE 0.9 % (FLUSH) 0.9 %
5-40 SYRINGE (ML) INJECTION AS NEEDED
Status: DISCONTINUED | OUTPATIENT
Start: 2021-03-29 | End: 2021-03-29

## 2021-03-29 RX ORDER — SODIUM CHLORIDE 9 MG/ML
10 INJECTION, SOLUTION INTRAVENOUS CONTINUOUS
Status: DISCONTINUED | OUTPATIENT
Start: 2021-03-29 | End: 2021-03-29

## 2021-03-29 RX ORDER — SODIUM CHLORIDE 0.9 % (FLUSH) 0.9 %
5-40 SYRINGE (ML) INJECTION EVERY 8 HOURS
Status: DISCONTINUED | OUTPATIENT
Start: 2021-03-29 | End: 2021-03-29

## 2021-03-29 RX ORDER — LIDOCAINE HYDROCHLORIDE 20 MG/ML
INJECTION, SOLUTION EPIDURAL; INFILTRATION; INTRACAUDAL; PERINEURAL AS NEEDED
Status: DISCONTINUED | OUTPATIENT
Start: 2021-03-29 | End: 2021-03-29 | Stop reason: HOSPADM

## 2021-03-29 RX ORDER — SODIUM CHLORIDE, SODIUM LACTATE, POTASSIUM CHLORIDE, CALCIUM CHLORIDE 600; 310; 30; 20 MG/100ML; MG/100ML; MG/100ML; MG/100ML
100 INJECTION, SOLUTION INTRAVENOUS CONTINUOUS
Status: DISCONTINUED | OUTPATIENT
Start: 2021-03-29 | End: 2021-03-29

## 2021-03-29 RX ADMIN — PHENYLEPHRINE HYDROCHLORIDE 100 MCG: 10 INJECTION INTRAVENOUS at 14:32

## 2021-03-29 RX ADMIN — Medication 10 ML: at 21:40

## 2021-03-29 RX ADMIN — Medication 10 ML: at 06:18

## 2021-03-29 RX ADMIN — SODIUM CHLORIDE, SODIUM LACTATE, POTASSIUM CHLORIDE, AND CALCIUM CHLORIDE 100 ML/HR: 600; 310; 30; 20 INJECTION, SOLUTION INTRAVENOUS at 14:00

## 2021-03-29 RX ADMIN — CEFAZOLIN SODIUM 2 G: 100 INJECTION, POWDER, LYOPHILIZED, FOR SOLUTION INTRAVENOUS at 13:58

## 2021-03-29 RX ADMIN — LIDOCAINE HYDROCHLORIDE 40 MG: 20 INJECTION, SOLUTION EPIDURAL; INFILTRATION; INTRACAUDAL; PERINEURAL at 14:30

## 2021-03-29 RX ADMIN — SODIUM CHLORIDE, SODIUM LACTATE, POTASSIUM CHLORIDE, AND CALCIUM CHLORIDE: 600; 310; 30; 20 INJECTION, SOLUTION INTRAVENOUS at 14:17

## 2021-03-29 RX ADMIN — PHENYLEPHRINE HYDROCHLORIDE 100 MCG: 10 INJECTION INTRAVENOUS at 14:38

## 2021-03-29 RX ADMIN — PROPOFOL 30 MG: 10 INJECTION, EMULSION INTRAVENOUS at 14:30

## 2021-03-29 NOTE — PROGRESS NOTES
SPEECH PATHOLOGY NOTE:    Per chart, patient NPO for PEG placement. Will follow up at later date.      Manolo Trujillo, LULY-SLP

## 2021-03-29 NOTE — PROGRESS NOTES
TRANSFER - OUT REPORT:    Verbal report given to Chago Howell RN on Mildred Rivera  being transferred to [address] [place] [address] for routine post - op       Report consisted of patients Situation, Background, Assessment and   Recommendations(SBAR). Information from the following report(s) SBAR was reviewed with the receiving nurse. Lines:   Peripheral IV 03/28/21 Left;Posterior Wrist (Active)   Site Assessment Clean, dry, & intact 03/29/21 0727   Phlebitis Assessment 0 03/29/21 0727   Infiltration Assessment 0 03/29/21 0727   Dressing Status Clean, dry, & intact 03/29/21 0727   Dressing Type Tape;Transparent 03/29/21 0727   Hub Color/Line Status Patent 03/29/21 0727   Alcohol Cap Used No 03/29/21 0727       Peripheral IV 03/29/21 Anterior;Right Foot (Active)        Opportunity for questions and clarification was provided.       Patient transported with:  Transport tech

## 2021-03-29 NOTE — PROGRESS NOTES
Progress Note    Patient: Catrachito Mar MRN: [id number]  SSN: [id number]    YOB: 1945  Age: 67 y.o. Sex: female      Admit Date: 3/22/2021    LOS: 7 days     Subjective:     \"75 y. o. female with medical h/o dementia, schizophrenia who presented to [facility] ER with report of several weeks progressive confusion and worsening agitation/confusion/ shuffling gait for past 3 days. Patient is minimally conversant so history obtained from son at bedside. She lives with her son with other family helping at times. They are overwhelmed by her needs and are concerned for her safety due to her wandering behaviors. \" They would like her to be placed into a nursing home. UTI found. Urine culture with mixed colonies. Speech therapy has not cleared for regular or thick liquids due to patient pocketing her food. Spoken to family who want PEG tube. NG tube feedings started 3/25. Plan for PEG tube 3/29. Has psych medications for outburst. Since obtaining her home medications, agitated much improved. Talking more today. No pain.  No concerns  Current Facility-Administered Medications   Medication Dose Route Frequency    deutetrabenazine tab 12 mg (Patient Supplied)  12 mg Nasogastric Tube BID    ceFAZolin (ANCEF) 2 g/20 mL in sterile water IV syringe  2 g IntraVENous ON CALL TO OR    NUTRITIONAL SUPPORT ELECTROLYTE PRN ORDERS   Does Not Apply PRN    HYDROcodone-acetaminophen (NORCO) 5-325 mg per tablet 1 Tab  1 Tab Oral Q4H PRN    morphine injection 1 mg  1 mg IntraVENous Q4H PRN    sodium chloride (NS) flush 5-40 mL  5-40 mL IntraVENous Q8H    sodium chloride (NS) flush 5-40 mL  5-40 mL IntraVENous PRN    acetaminophen (TYLENOL) tablet 650 mg  650 mg Oral Q6H PRN    Or    acetaminophen (TYLENOL) suppository 650 mg  650 mg Rectal Q6H PRN    polyethylene glycol (MIRALAX) packet 17 g  17 g Oral DAILY PRN    promethazine (PHENERGAN) tablet 12.5 mg  12.5 mg Oral Q6H PRN    Or    ondansetron (ZOFRAN) injection 4 mg  4 mg IntraVENous Q6H PRN    [Held by provider] enoxaparin (LOVENOX) injection 40 mg  40 mg SubCUTAneous DAILY    hydrALAZINE (APRESOLINE) 20 mg/mL injection 20 mg  20 mg IntraVENous Q6H PRN       Objective:     Vitals:    03/29/21 0353 03/29/21 0744 03/29/21 1008 03/29/21 1127   BP: 135/76 127/82  112/71   Pulse: 81 100  75   Resp: 20 20  20   Temp: 97.8 °F (36.6 °C) 97.7 °F (36.5 °C)  97.5 °F (36.4 °C)   SpO2: 97% 98%  99%   Weight:   53.3 kg (117 lb 8 oz)    Height:             Intake and Output:  Current Shift: No intake/output data recorded. Last three shifts: 03/27 1901 - 03/29 0700  In: 4592.3 [I.V.:2926.3]  Out: 750 [Urine:350]    Physical Exam:   General:  Alert, no distress, talking full sentences today. Relaxed and moving around in bed. Eyes:  Conjunctivae/corneas clear. Ears:  Normal TMs and external ear canals both ears. Nose: Nares normal. Septum midline. Mouth/Throat: Poor dentition   Neck:  no JVD. Back:   deferred. Lungs:   Clear to auscultation bilaterally. Heart:  Regular rate and rhythm, S1, S2 normal   Abdomen:   Soft, non-tender. Bowel sounds normal.    Extremities: Muscle wasting, limited ROM   Pulses: 2+ and symmetric all extremities. Skin: Skin color, texture, turgor normal. No rashes or lesions   Lymph nodes: Cervical, supraclavicular, and axillary nodes normal.   Neurologic:  No noted outbursts.         Lab/Data Review:    Recent Results (from the past 24 hour(s))   PHOSPHORUS    Collection Time: 03/29/21  5:24 AM   Result Value Ref Range    Phosphorus 2.9 2.3 - 3.7 MG/DL   MAGNESIUM    Collection Time: 03/29/21  5:24 AM   Result Value Ref Range    Magnesium 2.2 1.8 - 2.4 mg/dL   METABOLIC PANEL, BASIC    Collection Time: 03/29/21  5:24 AM   Result Value Ref Range    Sodium 141 136 - 145 mmol/L    Potassium 4.0 3.5 - 5.1 mmol/L    Chloride 107 98 - 107 mmol/L    CO2 30 21 - 32 mmol/L    Anion gap 4 (L) 7 - 16 mmol/L    Glucose 100 65 - 100 mg/dL    BUN 8 8 - 23 MG/DL    Creatinine 0.91 0.6 - 1.0 MG/DL    GFR est AA >60 >60 ml/min/1.73m2    GFR est non-AA >60 >60 ml/min/1.73m2    Calcium 9.7 8.3 - 10.4 MG/DL   HGB & HCT    Collection Time: 03/29/21  5:24 AM   Result Value Ref Range    HGB 12.1 11.7 - 15.4 g/dL    HCT 36.6 35.8 - 46.3 %       Assessment/ Plan:     Principal Problem:    Acute metabolic encephalopathy (6/74/3508)    Active Problems:    Schizophrenia ([facility] [address] (3/24/2021)      Dementia ([facility] [address] [facility] (3/24/2021)      Severe protein-calorie malnutrition ([facility] [address] [facility] (3/25/2021)    Per son, seems to be near her baseline. Has been declining since December of last year. Family wanting nursing home. UTI- Ceftriaxone 3/23-3/28    Hypokalemia - Resolved. Schizophrenia -  austedo 12mg daily. Discussed poor oral intake. NG tube for now, but family state they would want PEG tube for nutrition since eating very little and poor PO intake getting worse since December. GI to place PEG tube 3/29. Looking into nursing homes.      DVT prophylaxis - Lovenox  Signed By: Magdalena Maldonado, DO     March 29, 2021

## 2021-03-29 NOTE — PROGRESS NOTES
Attempted to contact son, Costa Salazar, to update him of procedure time, unable to reach him at this time.     [phone]

## 2021-03-29 NOTE — PROGRESS NOTES
TRANSFER - IN REPORT:    Verbal report received from [facility]  being received from [phone] for ordered procedure      Report consisted of patients Situation, Background, Assessment and   Recommendations(SBAR). Information from the following report(s) SBAR, Intake/Output, MAR, Recent Results, Med Rec Status and Pre Procedure Checklist was reviewed with the receiving nurse. Opportunity for questions and clarification was provided.

## 2021-03-29 NOTE — ANESTHESIA POSTPROCEDURE EVALUATION
Procedure(s):  ESOPHAGOGASTRODUODENOSCOPY (EGD)  PERCUTANEOUS ENDOSCOPIC GASTROSTOMY TUBE INSERTION ROOM 806. total IV anesthesia    Anesthesia Post Evaluation      Multimodal analgesia: multimodal analgesia not used between 6 hours prior to anesthesia start to PACU discharge  Patient location during evaluation: bedside  Patient participation: complete - patient participated  Level of consciousness: awake  Pain score: 1  Pain management: adequate  Airway patency: patent  Anesthetic complications: no  Cardiovascular status: acceptable  Respiratory status: acceptable  Hydration status: acceptable  Comments: Pt doing well. Post anesthesia nausea and vomiting:  none  Final Post Anesthesia Temperature Assessment:  Normothermia (36.0-37.5 degrees C)      INITIAL Post-op Vital signs:   Vitals Value Taken Time   /59 03/29/21 1614   Temp 36.4 °C (97.5 °F) 03/29/21 1601   Pulse 111 03/29/21 1617   Resp 18 03/29/21 1601   SpO2 100 % 03/29/21 1617   Vitals shown include unvalidated device data.

## 2021-03-29 NOTE — INTERVAL H&P NOTE
Update History & Physical    The Patient's History and Physical of March 26, 2021 was reviewed with the patient and I examined the patient. There was no change. The surgical site was confirmed by the patient and me. Plan:  The risk, benefits, expected outcome, and alternative to the recommended procedure have been discussed with the patient. Patient understands and wants to proceed with the procedure.     Electronically signed by Fab Nassar MD on 3/29/2021 at 12:06 PM

## 2021-03-29 NOTE — PROGRESS NOTES
TRANSFER - IN REPORT:    Verbal report received from GI Lab on Wes Pyle  being received from GI Lab for routine progression of care      Report consisted of patients Situation, Background, Assessment and   Recommendations(SBAR). Information from the following report(s) SBAR, Procedure Summary and MAR was reviewed with the receiving nurse. Okay to use PEG Tube for medications today but wait until tomorrow to use PEG for feedings PER MD. May place abdominal binder prn patient out of mitts. Opportunity for questions and clarification was provided. Assessment completed upon patients arrival to unit and care assumed.

## 2021-03-29 NOTE — PROGRESS NOTES
TRANSFER - OUT REPORT:    Verbal report given to Bree Wolf RN on Abla Goff  being transferred to GI Lab for ordered procedure       Report consisted of patients Situation, Background, Assessment and   Recommendations(SBAR). Information from the following report(s) SBAR, Intake/Output and MAR was reviewed with the receiving nurse. Lines:   Peripheral IV 03/28/21 Left;Posterior Wrist (Active)   Site Assessment Clean, dry, & intact 03/29/21 0353   Phlebitis Assessment 0 03/29/21 0353   Infiltration Assessment 0 03/29/21 0353   Dressing Status Clean, dry, & intact 03/29/21 0353   Dressing Type Transparent;Tape 03/29/21 0353   Hub Color/Line Status Patent 03/29/21 0353   Alcohol Cap Used No 03/28/21 1703        Opportunity for questions and clarification was provided.       Patient transported with:   Registered Nurse, patient on bilateral soft wrist restraints

## 2021-03-29 NOTE — ANESTHESIA PREPROCEDURE EVALUATION
Relevant Problems   NEUROLOGY   (+) Dementia (HCC)   (+) Schizophrenia (HCC)       Anesthetic History   No history of anesthetic complications            Review of Systems / Medical History  Patient summary reviewed and pertinent labs reviewed    Pulmonary  Within defined limits                 Neuro/Psych         Psychiatric history and dementia    Comments: schizophrenia Cardiovascular                  Exercise tolerance: <4 METS     GI/Hepatic/Renal                Endo/Other             Other Findings   Comments: Failure to thrive since [place]  malnutrition           Physical Exam    Airway             Cardiovascular    Rhythm: regular  Rate: normal         Dental         Pulmonary  Breath sounds clear to auscultation               Abdominal  GI exam deferred       Other Findings            Anesthetic Plan    ASA: 3  Anesthesia type: total IV anesthesia          Induction: Intravenous  Anesthetic plan and risks discussed with: Patient and Son / Daughter

## 2021-03-29 NOTE — PROGRESS NOTES
SBAR from [facility]. Patient in stable condition with resps even/unlabored. NAD noted. Patient on room air. Feeding paused and patient has been NPO since midnight. Patient is trying to get out of bed and bilateral soft wrist restraints in use for patient safety. Safety measures noted. Will continue to monitor per policy.

## 2021-03-29 NOTE — PROGRESS NOTES
ACUTE OT GOALS:  (Developed with and agreed upon by patient and/or caregiver.)    1. Patient will bathe and dress total body with moderate assistance and adaptive device as needed. 2. Patient will toilet with maximal assistance and adaptive device as needed. 3. Patient will tolerate 30 minutes of OT treatment with up to 2 rest breaks to increase activity tolerance for ADLs. 4. Patient will complete functional mobility for ADLs with supervision and adaptive device as needed. 5. Patient will demonstrate improved cognition for ADLs by completing functional tasks with up to moderate cueing from therapist.     Timeframe: 7 visits  OCCUPATIONAL THERAPY: Daily Note OT Treatment Day # 4    Awilda Mail is a 67 y.o. female   PRIMARY DIAGNOSIS: Acute metabolic encephalopathy  Acute metabolic encephalopathy [U36.90]    Day of Surgery  [place] / [place] ONLY / Product Type: Medicare /   ASSESSMENT:     REHAB RECOMMENDATIONS: CURRENT LEVEL OF FUNCTION:  (Most Recently Demonstrated)   Recommendation to date pending progress:  Setting:   Short-term Rehab  Equipment:    To Be Determined Bathing:   Not tested  Dressing:   Supervision  Feeding/Grooming:   Not tested  Toileting:   Total Assistance for bowel hygiene  Functional Mobility:   Minimal Assistance     ASSESSMENT:  Ms. Chhaya Espinal is doing well today. More verbal today. Pt performed bed mobility with supervision for bowel hygiene and brief change. Pt performed LB dressing with supervision at EOB. Pt demonstrates fair sitting balance at edge of bed during dressing. Pt performed mobility in hallway with min assist. Better balance today as well. Making good progress with goals. Will continue to benefit from skilled OT during stay.      SUBJECTIVE:   Ms. Chhaya Espinal states, \"I live in [place]\"    SOCIAL HISTORY/LIVING ENVIRONMENT:   Home Environment: Private residence  One/Two Story Residence: Two story  Living Alone: No  Support Systems: Child(segundo)    OBJECTIVE:     PAIN: VITAL SIGNS: LINES/DRAINS:   Pre Treatment: Pain Screen  Pain Scale 1: Numeric (0 - 10)  Pain Intensity 1: 0  Post Treatment: 0   IV  O2 Device: Room air     ACTIVITIES OF DAILY LIVING: I Mod I S SBA CGA Min Mod Max Total NT Comments   BASIC ADLs:              Bathing/ Showering [] [] [] [] [] [] [] [] [] [x]    Toileting [] [] [] [] [] [] [] [] [x] [] Bowel hygiene   Dressing [] [] [x] [] [] [] [] [] [] []    Feeding [] [] [] [] [] [] [] [] [] [x]    Grooming [] [] [] [] [] [] [] [] [] [x]    Personal Device Care [] [] [] [] [] [] [] [] [] [x]    Functional Mobility [] [] [] [] [] [x] [] [] [] [] x2   I=Independent, Mod I=Modified Independent, S=Supervision, SBA=Standby Assistance, CGA=Contact Guard Assistance,   Min=Minimal Assistance, Mod=Moderate Assistance, Max=Maximal Assistance, Total=Total Assistance, NT=Not Tested    MOBILITY: I Mod I S SBA CGA Min Mod Max Total  NT x2 Comments:   Supine to sit [] [] [x] [] [] [] [] [] [] [] []    Sit to supine [] [] [x] [] [] [] [] [] [] [] []    Sit to stand [] [] [] [] [x] [] [] [] [] [] []    Bed to chair [] [] [] [] [] [] [] [] [] [x] []    I=Independent, Mod I=Modified Independent, S=Supervision, SBA=Standby Assistance, CGA=Contact Guard Assistance,   Min=Minimal Assistance, Mod=Moderate Assistance, Max=Maximal Assistance, Total=Total Assistance, NT=Not Tested    BALANCE: Good Fair+ Fair Fair- Poor NT Comments   Sitting Static [] [] [x] [] [] []    Sitting Dynamic [] [] [x] [] [] []              Standing Static [] [] [x] [] [] []    Standing Dynamic [] [] [x] [] [] []      PLAN:   FREQUENCY/DURATION: OT Plan of Care: 3 times/week for duration of hospital stay or until stated goals are met, whichever comes first.    TREATMENT:   TREATMENT:   ($$ Self Care/Home Management: 8-22 mins$$ Therapeutic Activity: 8-22 mins   )  Therapeutic Activity (15 Minutes):  Therapeutic activity included Rolling, Supine to Sit, Sit to Supine, Scooting, Ambulation on level ground, Sitting balance  and Standing balance to improve functional Mobility, Strength and Activity tolerance. Self Care (10 Minutes): Self care including Toileting to increase independence and decrease level of assistance required.     TREATMENT GRID:  N/A    AFTER TREATMENT POSITION/PRECAUTIONS:  Chair, Needs within reach, Restraints  and mitts     INTERDISCIPLINARY COLLABORATION:  RN/PCT and OT/HUERTAS    TOTAL TREATMENT DURATION:  OT Patient Time In/Time Out  Time In: 0950  Time Out: [address], [place]

## 2021-03-29 NOTE — PROGRESS NOTES
No acute events overnight. Patient resting quietly in bed. Respirations present, even and unlabored on room air. Bed low and locked, safety measures in place. Bilateral soft wrist restraints present. NGT present, clamped. No signs of distress, no needs noted by this RN. Preparing to give report to oncoming RN.

## 2021-03-29 NOTE — PROCEDURES
PERCUTANEOUS ENDOSCOPIC GASTROSTOMY (PEG)    DATE of PROCEDURE: 3/29/2021    INDICATION:  1. Malnutrition  2. dysphagia    POSTPROCEDURE DIAGNOSIS:  1. Successful PEG placement with Dr Audra Mann: CELESTE    ASSISTANT:    INSTRUMENT: GIF    PROCEDURE:  After obtaining informed consent, the patient was left in the supine position and sedated. The endoscope was advanced under direct vision without difficulty. The esophagus, stomach (including retroflexed views) and duodenum were evaluated. PEG placement is as per surgeon's note. Re-endoscopy after the PEG confirmed appropriate position/location of inner bolster in the anterior wall, body of the stomach. The patient was taken to the recovery area in stable condition. FINDINGS:  ESOPHAGUS: Normal without HH or esophagitis    STOMACH:Finger pressure is applied at the point of maximal transillumination, and a focal indentation of the anterior gastric wall is visible endoscopically. This area is about 2.5 cm below the costal margin and away from the xiphoid process. Once a good point on the abdominal wall was selected by using the above maneuvers, a surgical pen is used to jared the site. The skin at this site is cleansed using the swab sticks containing povidone-iodine solution (provided in the PEG kit). A sterile drape is placed over the abdomen and the site is anesthetized with lidocaine delivered via the 5-mL syringe. The same needle was passed from the abdominal wall into the stomach (confirmed by endoscopic visualization) and noting its angle of entry. Next, the scalpel was used to make a horizontal incision at the marked site. The catheter-over-needle is then passed through this incision into the stomach. The endoscopist then took the snare from the kit and passed it through the working channel of the endoscope into the stomach.    The looped guide wire was then passed through this catheter into the stomach, where it was caught by the snare and pulled out of the mouth along with the endoscope and released from the snare and held by the endoscopist.  The catheter was then removed by threading it back over the guide wire. The PEG tube was then secured to the looped end of the guide wire coming out from the mouth with a square knot. The PEG tube was then be lubricated and pulled the guide wire on the abdominal wall end. After this the internal bumper sits snugly against the gastric mucosa. The endoscopist then inserted the endoscope into the stomach to confirm adequate placement. The external bumper was passed over the external portion of the PEG tube, and the external bumper was secured at 3 cm.       DUODENUM: Normal    Estimated blood loss: 0-minimal       PLAN:  - May use PEG tube immediately for medications  - May use PEG tube in AM for tube feeds per nutrition recommendations after GI checks on peg  - Keep head of bed elevated 30 degrees while PEG tube in use  - Check residuals qshift.   - Rotate external bolster daily.  - Keep PEG site clean and dry  - Do not place bandage over PEG site    Lay Martinez MD  [facility], [place]

## 2021-03-29 NOTE — PROGRESS NOTES
Comprehensive Nutrition Assessment    Type and Reason for Visit: Reassess  Tube Feeding Management (Palliative Care)    Nutrition Recommendations/Plan:   · Enteral Nutrition: once PEG placed and appropriate for use  · Continue Jevity 1.5 via PEG at goal rate of 45ml/hour. · Water flush 115ml Q4H.    · At goal will provide 1485 kcal (100% estimated calorie needs), 63 grams protein (100% estimated protein needs) TJV 1700EB free fluid (1ml/kcal) calculations based on 22 hours infusion. · Vitamin and Mineral Supplement Therapy:  · Electrolyte management replacement protocol active.   · Labs:  · BMP daily, Mg and Phos MWF.      Malnutrition Assessment:  Malnutrition Status: Severe malnutrition  Context: Chronic illness  Findings of clinical characteristics of malnutrition:   Energy Intake:  Mild decrease in energy intake (specify)(poor po intake reported per son)  Weight Loss:  7.0 - Greater than 7.5% over 3 months(133lbs at [facility] visit; 13% in 2 months)     Body Fat Loss:  7 - Severe body fat loss, Triceps, Orbital   Muscle Mass Loss:  1 - Mild muscle mass loss, Calf (gastrocnemius), Temples (temporalis), Thigh (quadraceps), Clavicles (pectoralis &deltoids)  Fluid Accumulation:  Unable to assess,     Strength:  Not performed     Nutrition Assessment:   Nutrition History: Unable to assess. Per SLP note on 3/24, son reports pt has had poor po intake since 3/18. Pt is taking a bite or 2 of foods such as mashed potatoes. Noted ED visit at [facility] on 1/2/21 due to cognitive decline. Per ED note, pt's son Chayito Urbano, stated pt was fine until December 26th and pt was unable to walk well, not eating, and unable to carry on conversations with family members. Noted pt has had a significant wt loss since office visit at [facility] on 1/28/21. Nutrition Background: Pt presents due to several weeks of progressive confusion and worsening agitation.  PMH notable for dementia and schizophrenia. Daily Update:  Pt NPO for PEG placement today. RN reports pt was at goal rate prior to stopping TF and has been tolerating feeds well. Noted agitation has improved since pt's son brought pt's psych meds. D5 stopped 3/28. Abdominal Status (last documented): Intact, Soft abdomen with Active  bowel sounds. Last BM 03/28/21. Pertinent Medications: meds noted  Pertinent Labs:     Lab Results   Component Value Date/Time     03/29/2021 05:24 AM    K 4.0 03/29/2021 05:24 AM     03/29/2021 05:24 AM    CO2 30 03/29/2021 05:24 AM    AGAP 4 (L) 03/29/2021 05:24 AM     03/29/2021 05:24 AM    BUN 8 03/29/2021 05:24 AM    CREA 0.91 03/29/2021 05:24 AM    GFRAA >60 03/29/2021 05:24 AM    GFRNA >60 03/29/2021 05:24 AM    CA 9.7 03/29/2021 05:24 AM    MG 2.2 03/29/2021 05:24 AM    PHOS 2.9 03/29/2021 05:24 AM       Nutrition Related Findings:   Bridled NGT placed 3/25; TF started 3/26      Current Nutrition Therapies:  DIET NUTRITIONAL SUPPLEMENTS All Meals; Ensure Enlive  DIET TUBE FEEDING Please open for rate and flush. Start once NGT is placed with placement of NGT confirmed.   DIET NPO Past Midnight, Except Meds  Current Tube Feeding (TF) Orders:   · Feeding Route: Nasogastric  · Formula: Jevity 1.5  · Schedule:Continuous    · Regimen: 15ml/hr, progress 10ml/hr Q8H to goal rate of 45ml/hr  · Additives/Modulars: (none ordered)  · Water Flushes: 115ml Q4H  · Current TF & Flush Orders Provides: on hold for PEG placement  · Goal TF & Flush Orders Provides: 1485 kcal (100% estimated calorie needs), 63 grams protein (100% estimated protein needs) and 1442ml free fluid (1ml/kcal) calculations based on 22 hours infusion    Current Intake:   Average Meal Intake: NPO Average Supplement Intake: NPO      Anthropometric Measures:  Height: 5' 4\" (162.6 cm)  Current Body Wt: 52.7 kg (116 lb 2.9 oz)(3/25), Weight source: Standing scale  BMI: 19.9, Underweight (BMI less than 22) age over 72  Admission Body Weight: 115 lb 8.3 oz(standing scale; 3/24)  Ideal Body Weight (lbs) (Calculated): 120 lbs (55 kg), 96.3 %  Usual Body Wt: 60.4 kg (133 lb 2.5 oz)(1/28/21;  [address] [place] [address]), Percent weight change: -13.2          Edema: No data recorded   Estimated Daily Nutrient Needs:  Energy (kcal/day): 3594-3834 (Kcal/kg(25-30), Weight Used: Current(52.4kg))  Protein (g/day): 52-63(1-1.2gm/kg) Weight Used: (Current(52.4kg))  Fluid (ml/day):   (1 ml/kcal)    Nutrition Diagnosis:   · Severe malnutrition, In context of chronic illness related to cognitive or neurological impairment, inadequate protein-energy intake as evidenced by (criteria provided in malnutrition assessment above)    · Inadequate oral intake related to cognitive or neurological impairment, swallowing difficulty as evidenced by (CLQ from spoon only; NGT for EN)    Nutrition Interventions:   Food and/or Nutrient Delivery: (Continue EN via PEG when appropriate)     Coordination of Nutrition Care: Continue to monitor while inpatient  Plan of Care discussed with Tano Tidwell RN    Goals:   Previous Goal Met: Progressing toward goal(s)  Active Goal: Maintain estimated nutrition needs via EN during admission    Nutrition Monitoring and Evaluation:      Food/Nutrient Intake Outcomes: Enteral nutrition intake/tolerance       Discharge Planning:    Enteral nutrition    Electronically signed by Preston Walters MS, RDN, LD 3/29/2021 at 9:49 AM  Contact: [phone]

## 2021-03-29 NOTE — PROGRESS NOTES
Patient arrived to floor via stretcher. NGT has been removed. Unfortunately, we cannot start tube feeds through PEG until tomorrow. Dietitian is aware and okay's feeds to start in am at previously tolerated rate. Will reorder previous clear liquid diet.

## 2021-03-30 LAB
GLUCOSE BLD STRIP.AUTO-MCNC: 84 MG/DL (ref 65–100)
SERVICE CMNT-IMP: NORMAL

## 2021-03-30 PROCEDURE — 77030018798 HC PMP KT ENTRL FED COVD -A

## 2021-03-30 PROCEDURE — 65270000029 HC RM PRIVATE

## 2021-03-30 PROCEDURE — 97530 THERAPEUTIC ACTIVITIES: CPT

## 2021-03-30 PROCEDURE — 82962 GLUCOSE BLOOD TEST: CPT

## 2021-03-30 PROCEDURE — 92526 ORAL FUNCTION THERAPY: CPT

## 2021-03-30 RX ADMIN — Medication 10 ML: at 14:29

## 2021-03-30 RX ADMIN — Medication 10 ML: at 21:44

## 2021-03-30 RX ADMIN — Medication 5 ML: at 06:00

## 2021-03-30 NOTE — PROGRESS NOTES
GI DAILY PROGRESS NOTE    Admit Date:  3/22/2021    Today's Date:  3/30/2021    CC:  dysphagia    Subjective:     Patient had 20Fr peg placed yesterday endoscopically. History of Present Illness:  Patient is a 67 y.o. female with PMH of renal cell cancer, dementia and schizophrenia, seen in consultation at the request of Dr. Bill Wharton for evaluation of feeding difficulty and need for PEG. She was admitted 3/22 by the hospitalist service after presenting to the ED with worsening confusion and agitation. She was found to have a UTI and has been treated with antibx. She has had poor po intake and evaluation by speech therapy reveals oral dysphagia with poor acceptance of foods but without airway compromise. NG tube has been placed for nutrition. Plans are for SNF at discharge and family is interested in pursuing PEG.      Medications:   Current Facility-Administered Medications   Medication Dose Route Frequency    deutetrabenazine tab 12 mg (Patient Supplied)  12 mg Nasogastric Tube BID    NUTRITIONAL SUPPORT ELECTROLYTE PRN ORDERS   Does Not Apply PRN    HYDROcodone-acetaminophen (NORCO) 5-325 mg per tablet 1 Tab  1 Tab Oral Q4H PRN    morphine injection 1 mg  1 mg IntraVENous Q4H PRN    sodium chloride (NS) flush 5-40 mL  5-40 mL IntraVENous Q8H    sodium chloride (NS) flush 5-40 mL  5-40 mL IntraVENous PRN    acetaminophen (TYLENOL) tablet 650 mg  650 mg Oral Q6H PRN    Or    acetaminophen (TYLENOL) suppository 650 mg  650 mg Rectal Q6H PRN    polyethylene glycol (MIRALAX) packet 17 g  17 g Oral DAILY PRN    promethazine (PHENERGAN) tablet 12.5 mg  12.5 mg Oral Q6H PRN    Or    ondansetron (ZOFRAN) injection 4 mg  4 mg IntraVENous Q6H PRN    enoxaparin (LOVENOX) injection 40 mg  40 mg SubCUTAneous DAILY    hydrALAZINE (APRESOLINE) 20 mg/mL injection 20 mg  20 mg IntraVENous Q6H PRN       Review of Systems:  ROS was obtained, with pertinent positives as listed above.   No chest pain or SOB.    Diet:  NPO    Objective:   Vitals:  Visit Vitals  BP (!) 141/87   Pulse 82   Temp 97.5 °F (36.4 °C)   Resp 20   Ht 5' 3\" (1.6 m)   Wt 53.2 kg (117 lb 4.8 oz)   SpO2 100%   BMI 20.78 kg/m²     Intake/Output:  03/30 0701 - 03/30 1900  In: 120 [P.O.:120]  Out: -   03/28 1901 - 03/30 0700  In: 1041.7 [I.V.:321.7]  Out: 351 [Urine:350]  Exam:  General appearance: alert, no distress  Lungs: clear to auscultation bilaterally anteriorly  Heart: regular rate and rhythm  Abdomen: soft, non-tender. Bowel sounds normal. No masses, no organomegaly. PEG in place C, D, I with binder covering peg  Extremities: extremities normal, atraumatic, no cyanosis or edema - [facility]  Neuro:  alert and oriented    Data Review (Labs):    Recent Labs     03/29/21  0524   HGB 12.1   HCT 36.6      K 4.0      CO2 30   BUN 8   CREA 0.91   CA 9.7   MG 2.2          Assessment:     Principal Problem:    Acute metabolic encephalopathy (1/02/5843)    Active Problems:    Schizophrenia ([facility] [address] (3/24/2021)      Dementia ([facility] [address] (3/24/2021)      Severe protein-calorie malnutrition ([facility] [address] (3/25/2021)      antony is a 67 y.o. female with PMH of renal cell cancer, dementia and schizophrenia, seen in consultation at the request of Dr. Boris Yang for evaluation of feeding difficulty and need for PEG. Plan:   Successful PEG placement with no concerning findings on exam today  Proceed with PEG feeds with nutrition recommendations  Will sign off and please call us back if needed.     Maia Nieto MD  [facility], [place]

## 2021-03-30 NOTE — PROGRESS NOTES
Pt sitting up in bed watching tv. Alert and oriented to self at this time. On RA. Nos/sx of distress noted. No evidence of pain. Lap belt and mitts in place. Borup pad placed under patient and attached to alarm while in room. Will continue to monitor.

## 2021-03-30 NOTE — PROGRESS NOTES
Patient remains in stable condition at this time. Respirations even and unlabored on room air. PEG tube in place, dressing intact. No distress noted at this time. No acute events over night. Shift report given to oncoming RN.

## 2021-03-30 NOTE — PROGRESS NOTES
Patient in bed resting quietly with family at the bedside. No needs expressed at this time. Safety measures in place. Will continue to monitor.

## 2021-03-30 NOTE — PROGRESS NOTES
Comprehensive Nutrition Assessment    Type and Reason for Visit: Reassess  Tube Feeding Management (Hospitalist)    Nutrition Recommendations/Plan:   · Enteral Nutrition: once PEG is appropriate for use for TF per GI  · Continue Jevity 1.5 via PEG at 25ml/hr and progress by 20ml/hr every 4 hours to goal rate of 45ml/hour. · Water flush 115ml Q4H.    · At goal will provide 1485 kcal (100% estimated calorie needs), 63 grams protein (100% estimated protein needs) QKB 0799VL free fluid (1ml/kcal) calculations based on 22 hours infusion. · Vitamin and Mineral Supplement Therapy:  · Electrolyte management replacement protocol active.   · Labs:  · BMP daily, Mg and Phos MWF. · Reordering daily BMP today while pt is on TF     Malnutrition Assessment:  Malnutrition Status: Severe malnutrition  Context: Chronic illness  Findings of clinical characteristics of malnutrition:   Energy Intake:  Mild decrease in energy intake (specify)(poor po intake reported per son)  Weight Loss:  7.0 - Greater than 7.5% over 3 months(133lbs at [facility] visit; 13% in 2 months)     Body Fat Loss:  7 - Severe body fat loss, Triceps, Orbital   Muscle Mass Loss:  1 - Mild muscle mass loss, Calf (gastrocnemius), Temples (temporalis), Thigh (quadraceps), Clavicles (pectoralis &deltoids)  Fluid Accumulation:  Unable to assess,     Strength:  Not performed     Nutrition Assessment:   Nutrition History: Unable to assess. Per SLP note on 3/24, son reports pt has had poor po intake since 3/18. Pt is taking a bite or 2 of foods such as mashed potatoes. Noted ED visit at [facility] [place] on 1/2/21 due to cognitive decline. Per ED note, pt's son Alysha Lopes), stated pt was fine until December 26th and pt was unable to walk well, not eating, and unable to carry on conversations with family members. Noted pt has had a significant wt loss since office visit at [address] [place] [address] on 1/28/21.       Nutrition Background: Pt presents due to several weeks of progressive confusion and worsening agitation. PMH notable for dementia and schizophrenia. Daily Update:  Pt had PEG placed yesterday. Per GI procedure note, may use PEG in AM for tube feeds per nutrition recommendations after GI checks on PEG. Discussed with RN, new order will be placed with instructions to restart TF when GI approves PEG for use. Pt seen in bed with soft mitt restraints asking for food and to have mitts removed. Abdominal Status (last documented): (PEG tube) abdomen with Active  bowel sounds. Last BM 03/29/21. Pertinent Medications: PRN Miralax   Pertinent Labs: No new labs today    Nutrition Related Findings:   Bridled NGT placed 3/25; TF started 3/26; 20FR PEG placed 3/29      Current Nutrition Therapies:  DIET NUTRITIONAL SUPPLEMENTS All Meals; Ensure Clear  DIET CLEAR LIQUID  DIET TUBE FEEDING Please open for rate and flush. Start once GI approves for PEG to be used. Current Intake:   Average Meal Intake: 0% Average Supplement Intake: 0%      Anthropometric Measures:  Height: 5' 3\" (160 cm)  Current Body Wt: 53.3 kg (117 lb 8.1 oz)(3/29), Weight source: Standing scale  BMI: 20.8, Underweight (BMI less than 22) age over 72  Admission Body Weight: 115 lb 8.3 oz(standing scale; 3/24)  Ideal Body Weight (lbs) (Calculated): 115 lbs (52 kg), 96.3 %  Usual Body Wt: 60.4 kg (133 lb 2.5 oz)(1/28/21;  [address] [place] [address]), Percent weight change: -13.2          Edema: No data recorded   Estimated Daily Nutrient Needs:  Energy (kcal/day): 5763-6201 (Kcal/kg(25-30), Weight Used: Current(52.4kg))  Protein (g/day): 52-63(1-1.2gm/kg) Weight Used: (Current(52.4kg))  Fluid (ml/day):   (1 ml/kcal)    Nutrition Diagnosis:   · Severe malnutrition, In context of chronic illness related to cognitive or neurological impairment, inadequate protein-energy intake as evidenced by (criteria provided in malnutrition assessment above)    · Inadequate oral intake related to cognitive or neurological impairment, swallowing difficulty as evidenced by (PEG for primary nutrition)    Nutrition Interventions:   Food and/or Nutrient Delivery: Continue current diet, Start tube feeding     Coordination of Nutrition Care: Continue to monitor while inpatient  Plan of Care discussed with BONNIE Seymour    Goals:   Previous Goal Met: Progressing toward goal(s)  Active Goal: Maintain estimated nutrition needs via EN during admission    Nutrition Monitoring and Evaluation:      Food/Nutrient Intake Outcomes: Enteral nutrition intake/tolerance, Diet advancement/tolerance, Food and nutrient intake, Supplement intake       Discharge Planning:    Enteral nutrition    Electronically signed by Letitia Auguste MS, RDN, LD 3/30/2021 at 8:50 AM  Contact: [phone]

## 2021-03-30 NOTE — PROGRESS NOTES
ACUTE PHYSICAL THERAPY GOALS:  (Developed with and agreed upon by patient and/or caregiver. )  LTG:  (1.)Ms. Mayank Ramey will move from supine to sit and sit to supine, scoot up and down and roll side to side in bed INDEPENDENTLY with bed flat within 7 treatment day(s). (2.)Ms. Mayank Ramey will transfer from bed to chair and chair to bed with SUPERVISION using the least restrictive device within 7 treatment day(s). (3.)Ms. Mayank Ramey will ambulate with SUPERVISION for 150 feet with the least restrictive device within 7 treatment day(s). (4.)Ms. Mayank Ramey will ascend and descend 10 steps with MINIMAL ASSIST using handrail(s) and/or handheld assist within 7 days. PHYSICAL THERAPY: Daily Note and AM Treatment Day # 4    Wes Pyle is a 67 y.o. female   PRIMARY DIAGNOSIS: Acute metabolic encephalopathy  Acute metabolic encephalopathy [Y52.68]    1 Day Post-Op    ASSESSMENT:     REHAB RECOMMENDATIONS: CURRENT LEVEL OF FUNCTION:  (Most Recently Demonstrated)   Recommendation to date pending progress:  Setting:   Short-term Rehab  Equipment:    To Be Determined Bed Mobility:   Minimal Assistance  Sit to Stand:   Moderate Assistance to min A  Transfers:   Moderate Assistance to min A  Gait/Mobility:   Minimal Assistance      ASSESSMENT:  Ms. Mayank Ramey is alert, confused, states few words this date, mostly non verbal. Pt in B mitts, lap belt. Pt following directional cues more this date, not resistive to mobility. PT doffed mitts, pt does pull at gown, abdominal binder, brief periodically. Pt assisted to bathroom with min/mod A, total A for doffing/donning brief. After toileting, pt CGA for [place] care, able to perform most of task with SBA. Pt ambulated into hallway with HHA x 1, MIN A overall, short shuffled steps. Pt returned to room, seated in chair with alarms and mitts/lap belt in place. Pt with improvement this date in overall mobility and activity tolerance.        SUBJECTIVE:   Ms. Mayank Ramey with one word responses at times    SOCIAL HISTORY/ LIVING ENVIRONMENT: Spoke with son (evangelist) who reports pt lives with him and another son and daughter in two story home. His siblings are both moving out soon and he works. Patients bedroom is upstairs; she has been unable to navigate steps for the past 3-4 months so has bee staying upstairs. Needs some assistance with ADLs. Denies DME use or falls prior to admission. States patients speech and memory have been drastically declining.    Home Environment: Private residence  One/Two Story Residence: Two story  Living Alone: No  Support Systems: Child(segundo)  OBJECTIVE:     PAIN: VITAL SIGNS: LINES/DRAINS:   Pre Treatment: Pain Screen  Pain Scale 1: FLACC  Pain Intensity 1: 0  Post Treatment: 0/10 FLACC Vital Signs  O2 Device: Room air PEG  O2 Device: Room air     MOBILITY: I Mod I S SBA CGA Min Mod Max Total  NT x2 Comments:   Bed Mobility    Rolling [] [] [] [] [] [x] [] [] [] [] []    Supine to Sit [] [] [] [] [] [x] [x] [] [] [] []    Scooting [] [] [] [] [] [x] [] [] [] [] []    Sit to Supine [] [] [] [] [] [] [] [] [] [x] [] chair   Transfers    Sit to Stand [] [] [] [] [] [x] [x] [] [] [] []    Bed to Chair [] [] [] [] [] [x] [] [] [] [] []    Stand to Sit [] [] [] [] [] [x] [] [] [] [] []    I=Independent, Mod I=Modified Independent, S=Supervision, SBA=Standby Assistance, CGA=Contact Guard Assistance,   Min=Minimal Assistance, Mod=Moderate Assistance, Max=Maximal Assistance, Total=Total Assistance, NT=Not Tested    BALANCE: Good Fair+ Fair Fair- Poor NT Comments   Sitting Static [] [x] [] [] [] []    Sitting Dynamic [] [x] [] [] [] []              Standing Static [] [x] [] [] [] []    Standing Dynamic [] [] [x] [] [] []      GAIT: I Mod I S SBA CGA Min Mod Max Total  NT x2 Comments:   Level of Assistance [] [] [] [] [] [x] [x] [] [] [] []    Distance 250 ft    DME Gait Belt and HHA x 1, MIN A     Gait Quality Shuffled, short steps    Weightbearing  Status N/A     I=Independent, Mod I=Modified Independent, S=Supervision, SBA=Standby Assistance, CGA=Contact Guard Assistance,   Min=Minimal Assistance, Mod=Moderate Assistance, Max=Maximal Assistance, Total=Total Assistance, NT=Not Tested    PLAN:   FREQUENCY/DURATION: PT Plan of Care: 3 times/week for duration of hospital stay or until stated goals are met, whichever comes first.  TREATMENT:     TREATMENT:   ($$ Therapeutic Activity: 23-37 mins    )  Therapeutic Activity (25 Minutes): Therapeutic activity included Supine to Sit, Scooting, Transfer Training, Ambulation on level ground, Sitting balance , Standing balance and toilet transfers to improve functional Mobility, Strength and Activity tolerance.      TREATMENT GRID:  N/A    AFTER TREATMENT POSITION/PRECAUTIONS:  Alarm Activated, Chair, Needs within reach and Restraints     INTERDISCIPLINARY COLLABORATION:  RN/PCT and PT/PTA    TOTAL TREATMENT DURATION:  PT Patient Time In/Time Out  Time In: 3249  Time Out: 10[facility], PT

## 2021-03-30 NOTE — PROGRESS NOTES
LTG: Patient will tolerate least restrictive diet without overt signs or symptoms of airway compromise. MET 3/30/21  STG: Patient will tolerate thin liquids without overt signs or symptoms of airway compromise. MET 3/30/21  STG: Patient will tolerate ongoing po trials in efforts to advance diet. Discontinued 3/30/21  STG: Patient will participate in modified barium swallow study as clinically indicated. Not indicated/discontinued 3/30    SPEECH LANGUAGE PATHOLOGY: DYSPHAGIA- Daily Note 4 and Discharge  NAME/AGE/GENDER: Kirstin Monroe is a 67 y.o. female  DATE: 3/30/2021  PRIMARY DIAGNOSIS: Acute metabolic encephalopathy [Y37.11]   1 Day Post-Op  ICD-10: Treatment Diagnosis: R13.12 Dysphagia, Oropharyngeal Phase    RECOMMENDATIONS   DIET: WITH 1:1 ASSSITANCE   Thin Liquids by spoon or single straw sips for pleasure    MEDICATIONS: Non-oral     ASPIRATION PRECAUTIONS  · Slow rate of intake  · Small bites/sips  · Upright at 90 degrees during meal     COMPENSATORY STRATEGIES/MODIFICATIONS  · Liquids by spoon/single straw sips when awake/alert/upright and actively accepting  · 1:1 assistance  · Cues to swallow      RECOMMENDATIONS for CONTINUED SPEECH THERAPY:   No further speech therapy indicated at this time. ASSESSMENT   Patient continues to demonstrate oral dysphagia with mild oral holding of thin liquids and poor acceptance of po trials. Consuming minimal trials of thin by teaspoon and single straw sips without overt s/sx airway compromise. Refusing puree trials. Recommend liquids only/thin liquids via spoon or single straw sip for pleasure with staff. Needs 1:1 assistance with oral intake. Patient unable to follow commands to functionally participate in dysphagia treament. No progress has been made toward diet advancement since start of care. PEG placed for nutrition per family wishes. Discontinue speech therapy services at this time.      EDUCATION:  · Recommendations discussed with Patient and RN  CONTINUATION OF SKILLED SERVICES/MEDICAL NECESSITY:   No additional speech services warranted. REHABILITATION POTENTIAL FOR STATED GOALS: Good    PLAN    FREQUENCY/DURATION: No further speech therapy needs at this time.     - Recommendations for next treatment session: No additional speech therapy indicated at this time. SUBJECTIVE   Patient upright in bedside chair with lap belt and mitts in place. Minimally verbal, but does state name. Oxygen Device: room air  Pain: Pain Scale 1: Adult Nonverbal Pain Scale  Pain Intensity 1: 0    History of Present Injury/Illness: Ms. Larisa Gordon  has a past medical history of Cancer [facility]) and Psychiatric disorder. . She also  has a past surgical history that includes hx other surgical. PRECAUTIONS/ALLERGIES: Patient has no known allergies. Problem List:  (Impairments causing functional limitations):  1. Oropharyngeal dysphagia     Prior dysphagia: Per chart, it appears decline in intake has been over the past several months. Orientation: Does not respond to name. OBJECTIVE   Dysphagia treatment-po trials/tolerance  Patient consumed thin by teaspoon across 4 trials and thin by single straw sips across 5 trials. Mild oral holding with thin liquids, but no overt s/sx airway compromise. Accepted only 2 small bites of puree before refusing further po intake.       Tool Used: Dysphagia Outcome and Severity Scale (WILY)    Score Comments   Normal Diet  [] 7 With no strategies or extra time needed   Functional Swallow  [] 6 May have mild oral or pharyngeal delay   Mild Dysphagia  [] 5 Which may require one diet consistency restricted    Mild-Moderate Dysphagia  [] 4 With 1-2 diet consistencies restricted   Moderate Dysphagia  [] 3 With 2 or more diet consistencies restricted   Moderate-Severe Dysphagia  [] 2 With partial PO strategies (trials with ST only)   Severe Dysphagia  [] 1 With inability to tolerate any PO safely      Score:  Initial: 3 Most Recent: 3 (Date 03/30/21 )   Interpretation of Tool: The Dysphagia Outcome and Severity Scale (WILY) is a simple, easy-to-use, 7-point scale developed to systematically rate the functional severity of dysphagia based on objective assessment and make recommendations for diet level, independence level, and type of nutrition.          INTERDISCIPLINARY COLLABORATION: RN    After treatment position/precautions:  · Upright in chair  · RN notified   · Lap belt and Mitts in place    Total Treatment Duration:   Time In: 1104  Time Out: [address] [facility] [address], [address] [facility]

## 2021-03-30 NOTE — PROGRESS NOTES
Progress Note    Patient: Herbert Rodriguez MRN: [id number]  SSN: [id number]    YOB: 1945  Age: 67 y.o. Sex: female      Admit Date: 3/22/2021    LOS: 8 days     Subjective:     \"75 y. o. female with medical h/o dementia, schizophrenia who presented to [facility] ER with report of several weeks progressive confusion and worsening agitation/confusion/ shuffling gait for past 3 days. Patient is minimally conversant so history obtained from son at bedside. She lives with her son with other family helping at times. They are overwhelmed by her needs and are concerned for her safety due to her wandering behaviors. \" They would like her to be placed into a nursing home. UTI found. Urine culture with mixed colonies. Speech therapy has not cleared for regular or thick liquids due to patient pocketing her food. Spoken to family who want PEG tube. NG tube feedings started 3/25. PEG tube placed 3/29. To start tube feedings today. Has psych medications for outburst. Since obtaining her home medications, agitated much improved. Did have to be placed in mittens overnight. Not speaking this AM. No acute distress.    Current Facility-Administered Medications   Medication Dose Route Frequency    deutetrabenazine tab 12 mg (Patient Supplied)  12 mg Nasogastric Tube BID    NUTRITIONAL SUPPORT ELECTROLYTE PRN ORDERS   Does Not Apply PRN    HYDROcodone-acetaminophen (NORCO) 5-325 mg per tablet 1 Tab  1 Tab Oral Q4H PRN    morphine injection 1 mg  1 mg IntraVENous Q4H PRN    sodium chloride (NS) flush 5-40 mL  5-40 mL IntraVENous Q8H    sodium chloride (NS) flush 5-40 mL  5-40 mL IntraVENous PRN    acetaminophen (TYLENOL) tablet 650 mg  650 mg Oral Q6H PRN    Or    acetaminophen (TYLENOL) suppository 650 mg  650 mg Rectal Q6H PRN    polyethylene glycol (MIRALAX) packet 17 g  17 g Oral DAILY PRN    promethazine (PHENERGAN) tablet 12.5 mg  12.5 mg Oral Q6H PRN    Or    ondansetron (ZOFRAN) injection 4 mg  4 mg IntraVENous Q6H PRN    [Held by provider] enoxaparin (LOVENOX) injection 40 mg  40 mg SubCUTAneous DAILY    hydrALAZINE (APRESOLINE) 20 mg/mL injection 20 mg  20 mg IntraVENous Q6H PRN       Objective:     Vitals:    03/29/21 2321 03/30/21 0327 03/30/21 0740 03/30/21 0907   BP: 116/78 111/78 (!) 154/82    Pulse: 88 81 94    Resp: 18 19 20    Temp: 97.6 °F (36.4 °C) 98.3 °F (36.8 °C) 98 °F (36.7 °C)    SpO2: 91% 94% 99%    Weight:    53.2 kg (117 lb 4.8 oz)   Height:             Intake and Output:  Current Shift: No intake/output data recorded. Last three shifts: 03/28 1901 - 03/30 0700  In: 1041.7 [I.V.:321.7]  Out: 351 [Urine:350]    Physical Exam:   General:  Alert, no distress, confused. Eyes:  Conjunctivae/corneas clear. Ears:  Normal TMs and external ear canals both ears. Nose: Nares normal. Septum midline. Mouth/Throat: Poor dentition   Neck:  no JVD. Back:   deferred. Lungs:   Clear to auscultation bilaterally. Heart:  Regular rate and rhythm, S1, S2 normal   Abdomen:   Soft, non-tender. Bowel sounds normal. PEG tube in place   Extremities: Muscle wasting, limited ROM   Pulses: 2+ and symmetric all extremities. Skin: Skin color, texture, turgor normal. No rashes or lesions   Lymph nodes: Cervical, supraclavicular, and axillary nodes normal.   Neurologic:  No noted outbursts. Lab/Data Review:    Recent Results (from the past 24 hour(s))   GLUCOSE, POC    Collection Time: 03/30/21  6:29 AM   Result Value Ref Range    Glucose (POC) 84 65 - 100 mg/dL    Performed by Kiran        Assessment/ Plan:     Principal Problem:    Acute metabolic encephalopathy (6/27/5471)    Active Problems:    Schizophrenia ([address] (3/24/2021)      Dementia ([facility] [address] (3/24/2021)      Severe protein-calorie malnutrition ([address] (3/25/2021)    Per son, seems to be near her baseline. Has been declining since December of last year. Family wanting nursing home.      UTI- Ceftriaxone 3/23-3/28    Hypokalemia - Resolved. Schizophrenia -  austedo 12mg daily. Discussed poor oral intake. S/p PEG tube 3/29. To start tube feedings today. Looking into nursing homes.      DVT prophylaxis - Lovenox  Signed By: David Duffy DO     March 30, 2021

## 2021-03-31 LAB
ANION GAP SERPL CALC-SCNC: 5 MMOL/L (ref 7–16)
BUN SERPL-MCNC: 12 MG/DL (ref 8–23)
CALCIUM SERPL-MCNC: 9.3 MG/DL (ref 8.3–10.4)
CHLORIDE SERPL-SCNC: 107 MMOL/L (ref 98–107)
CO2 SERPL-SCNC: 29 MMOL/L (ref 21–32)
CREAT SERPL-MCNC: 1 MG/DL (ref 0.6–1)
GLUCOSE SERPL-MCNC: 107 MG/DL (ref 65–100)
MAGNESIUM SERPL-MCNC: 2.2 MG/DL (ref 1.8–2.4)
PHOSPHATE SERPL-MCNC: 3.4 MG/DL (ref 2.3–3.7)
POTASSIUM SERPL-SCNC: 3.8 MMOL/L (ref 3.5–5.1)
SODIUM SERPL-SCNC: 141 MMOL/L (ref 136–145)

## 2021-03-31 PROCEDURE — 36415 COLL VENOUS BLD VENIPUNCTURE: CPT

## 2021-03-31 PROCEDURE — 74011250637 HC RX REV CODE- 250/637: Performed by: INTERNAL MEDICINE

## 2021-03-31 PROCEDURE — 65270000029 HC RM PRIVATE

## 2021-03-31 PROCEDURE — 97535 SELF CARE MNGMENT TRAINING: CPT

## 2021-03-31 PROCEDURE — 84100 ASSAY OF PHOSPHORUS: CPT

## 2021-03-31 PROCEDURE — 97530 THERAPEUTIC ACTIVITIES: CPT

## 2021-03-31 PROCEDURE — 80048 BASIC METABOLIC PNL TOTAL CA: CPT

## 2021-03-31 PROCEDURE — 83735 ASSAY OF MAGNESIUM: CPT

## 2021-03-31 PROCEDURE — 74011250636 HC RX REV CODE- 250/636: Performed by: INTERNAL MEDICINE

## 2021-03-31 RX ADMIN — ACETAMINOPHEN 650 MG: 325 TABLET ORAL at 14:52

## 2021-03-31 RX ADMIN — ENOXAPARIN SODIUM 40 MG: 40 INJECTION SUBCUTANEOUS at 08:25

## 2021-03-31 RX ADMIN — Medication 10 ML: at 08:25

## 2021-03-31 RX ADMIN — Medication 5 ML: at 14:09

## 2021-03-31 NOTE — PROGRESS NOTES
MSN, CM:  Referrals sent to several SNF for admission when medically stable. Patient is advancing with TF and working well with PT. Case Management will continue to follow.

## 2021-03-31 NOTE — PROGRESS NOTES
ACUTE OT GOALS:  (Developed with and agreed upon by patient and/or caregiver.)    1. Patient will bathe and dress total body with moderate assistance and adaptive device as needed. 2. Patient will toilet with maximal assistance and adaptive device as needed. 3. Patient will tolerate 30 minutes of OT treatment with up to 2 rest breaks to increase activity tolerance for ADLs. 4. Patient will complete functional mobility for ADLs with supervision and adaptive device as needed. 5. Patient will demonstrate improved cognition for ADLs by completing functional tasks with up to moderate cueing from therapist.     Timeframe: 7 visits  OCCUPATIONAL THERAPY: Daily Note OT Treatment Day # 5    Wes Pyle is a 67 y.o. female   PRIMARY DIAGNOSIS: Acute metabolic encephalopathy  Acute metabolic encephalopathy [M55.80]  Reason for Referral:  Weakness  ICD-10: Treatment Diagnosis: Generalized Muscle Weakness (M62.81)  Other lack of cordination (R27.8)  2 Days Post-Op  [place] / [place] ONLY / Product Type: Medicare /   ASSESSMENT:     REHAB RECOMMENDATIONS: CURRENT LEVEL OF FUNCTION:  (Most Recently Demonstrated)   Recommendation to date pending progress:  Setting:   Short-term Rehab  Equipment:    To Be Determined Bathing:   Not tested  Dressing:   Not tested  Feeding/Grooming:   Moderate Assistance  Toileting:   Not tested   Functional Mobility:   Minimal Assistance     ASSESSMENT:  Ms. Mayank Ramey presents with metabolic encephalopathy. Hx dementia, schizophrenia, per chart has had recent and drastic decline in memory and speech, increased confusion and wandering. At baseline pt lives with children, completes ADLs with some assistance and ambulates independently. Pt with decreased strength, balance, endurance, and cognition impacting mobility and ADLs.  Practiced functional transfers with CGA, ambulation with HHA-Danielle, initially with 2 slight losses of balance but improved steadiness with time. Practiced grooming in sitting with Mod-MaxA for thoroughness. Pt able to follow some commands and answer some questions today. Good progress towards goals. SUBJECTIVE:   Ms. Sangita Mena states, \"I'm okay. \"    SOCIAL HISTORY/LIVING ENVIRONMENT: Per PT note, pt lives with children in a 2 level home with bedroom upstairs. Pt receives some assistance with ADLs, is independent with ambulation with no hx falls.  Pt's speech and memory have been drastically declining and pt will not have 24/7 supervision as son works   Home Environment: Private residence  [facility] Residence: Two story  Living Alone: No  New Pia: Child(segundo)    OBJECTIVE:     PAIN: VITAL SIGNS: LINES/DRAINS:   Pre Treatment: Pain Screen  Pain Scale 1: FLACC  Pain Intensity 1: 0  Post Treatment: 0   PEG  O2 Device: Room air     ACTIVITIES OF DAILY LIVING: I Mod I S SBA CGA Min Mod Max Total NT Comments   BASIC ADLs:              Bathing/ Showering [] [] [] [] [] [] [] [] [] [x]    Toileting [] [] [] [] [] [] [] [] [] [x]    Dressing [] [] [] [] [] [] [] [] [] [x]    Feeding [] [] [] [] [] [] [] [] [] [x]    Grooming [] [] [] [] [] [] [x] [x] [] [] Brush teeth, wash face   Personal Device Care [] [] [] [] [] [] [] [] [] [x]    Functional Mobility [] [] [] [] [] [x] [] [] [] [] HHA   I=Independent, Mod I=Modified Independent, S=Supervision, SBA=Standby Assistance, CGA=Contact Guard Assistance,   Min=Minimal Assistance, Mod=Moderate Assistance, Max=Maximal Assistance, Total=Total Assistance, NT=Not Tested    MOBILITY: I Mod I S SBA CGA Min Mod Max Total  NT x2 Comments:   Supine to sit [] [] [] [] [] [] [] [] [] [] []    Sit to supine [] [] [] [x] [] [] [] [] [] [] []    Sit to stand [] [] [] [] [x] [] [] [] [] [] []    Bed to chair [] [] [] [] [] [x] [] [] [] [] []    I=Independent, Mod I=Modified Independent, S=Supervision, SBA=Standby Assistance, CGA=Contact Guard Assistance,   Min=Minimal Assistance, Mod=Moderate Assistance, Max=Maximal Assistance, Total=Total Assistance, NT=Not Tested    BALANCE: Good Fair+ Fair Fair- Poor NT Comments   Sitting Static [] [x] [] [] [] []    Sitting Dynamic [] [x] [] [] [] []              Standing Static [] [] [x] [] [] []    Standing Dynamic [] [] [x] [] [] []      PLAN:   FREQUENCY/DURATION: OT Plan of Care: 3 times/week for duration of hospital stay or until stated goals are met, whichever comes first.    TREATMENT:   TREATMENT:   ($$ Self Care/Home Management: 8-22 mins$$ Therapeutic Activity: 23-37 mins   )  Therapeutic Activity (23 Minutes): Therapeutic activity included Rolling, Sit to Supine, Scooting, Transfer Training, Ambulation on level ground, Sitting balance  and Standing balance to improve functional Mobility, Strength and Activity tolerance. Self Care (15 Minutes): Self care including Grooming to increase independence and decrease level of assistance required.     TREATMENT GRID:  N/A    AFTER TREATMENT POSITION/PRECAUTIONS:  Alarm Activated, Bed, Needs within reach, Restraints , RN notified and Mitts, lap belt, sitter at bedside    INTERDISCIPLINARY COLLABORATION:  RN/PCT and OT/HUERTAS    TOTAL TREATMENT DURATION:  OT Patient Time In/Time Out  Time In: 0915  Time Out: [address], OTR/L

## 2021-03-31 NOTE — PROGRESS NOTES
Progress Note    Patient: Trudi Granados MRN: [id number]  SSN: [id number]    YOB: 1945  Age: 67 y.o. Sex: female      Admit Date: 3/22/2021    LOS: 9 days     Subjective:     \"75 y. o. female with medical h/o dementia, schizophrenia who presented to [facility] ER with report of several weeks progressive confusion and worsening agitation/confusion/ shuffling gait for past 3 days. Patient is minimally conversant so history obtained from son at bedside. She lives with her son with other family helping at times. They are overwhelmed by her needs and are concerned for her safety due to her wandering behaviors. \" They would like her to be placed into a nursing home. UTI found. Urine culture with mixed colonies. Speech therapy has not cleared for regular or thick liquids due to patient pocketing her food. Spoken to family who want PEG tube. NG tube feedings started 3/25. PEG tube placed 3/29. Tolerating tube feedings well. Has psych medications for outburst. Since obtaining her home medications, agitated much improved. [facility] Not speaking this AM. No acute distress.    Current Facility-Administered Medications   Medication Dose Route Frequency    deutetrabenazine tab 12 mg (Patient Supplied)  12 mg Nasogastric Tube BID    NUTRITIONAL SUPPORT ELECTROLYTE PRN ORDERS   Does Not Apply PRN    HYDROcodone-acetaminophen (NORCO) 5-325 mg per tablet 1 Tab  1 Tab Oral Q4H PRN    morphine injection 1 mg  1 mg IntraVENous Q4H PRN    sodium chloride (NS) flush 5-40 mL  5-40 mL IntraVENous Q8H    sodium chloride (NS) flush 5-40 mL  5-40 mL IntraVENous PRN    acetaminophen (TYLENOL) tablet 650 mg  650 mg Oral Q6H PRN    Or    acetaminophen (TYLENOL) suppository 650 mg  650 mg Rectal Q6H PRN    polyethylene glycol (MIRALAX) packet 17 g  17 g Oral DAILY PRN    promethazine (PHENERGAN) tablet 12.5 mg  12.5 mg Oral Q6H PRN    Or    ondansetron (ZOFRAN) injection 4 mg  4 mg IntraVENous Q6H PRN    enoxaparin (LOVENOX) injection 40 mg  40 mg SubCUTAneous DAILY    hydrALAZINE (APRESOLINE) 20 mg/mL injection 20 mg  20 mg IntraVENous Q6H PRN       Objective:     Vitals:    03/30/21 1937 03/30/21 2259 03/31/21 0308 03/31/21 0754   BP: 111/76 120/75 125/81 116/72   Pulse: 90 82 89 (!) 104   Resp: 20 21 20 20   Temp: 98.4 °F (36.9 °C) 98.2 °F (36.8 °C) 98.4 °F (36.9 °C) 97.6 °F (36.4 °C)   SpO2: 98% 97% 100% 98%   Weight:       Height:             Intake and Output:  Current Shift: No intake/output data recorded. Last three shifts: 03/29 1901 - 03/31 0700  In: 417 [P.O.:417]  Out: 450 [Urine:200]    Physical Exam:   General:  Alert, no distress, trying to get out of chair. Will answer a few questions for me. Eyes:  Conjunctivae/corneas clear. Ears:  Normal TMs and external ear canals both ears. Nose: Nares normal. Septum midline. Mouth/Throat: Poor dentition   Neck:  no JVD. Back:   deferred. Lungs:   Clear to auscultation bilaterally. Heart:  Regular rate and rhythm, S1, S2 normal   Abdomen:   Soft, non-tender. Bowel sounds normal. PEG tube in place   Extremities: Muscle wasting, limited ROM   Pulses: 2+ and symmetric all extremities. Skin: Skin color, texture, turgor normal. No rashes or lesions   Lymph nodes: Cervical, supraclavicular, and axillary nodes normal.   Neurologic:  No noted outbursts.         Lab/Data Review:    Recent Results (from the past 24 hour(s))   PHOSPHORUS    Collection Time: 03/31/21  5:25 AM   Result Value Ref Range    Phosphorus 3.4 2.3 - 3.7 MG/DL   MAGNESIUM    Collection Time: 03/31/21  5:25 AM   Result Value Ref Range    Magnesium 2.2 1.8 - 2.4 mg/dL   METABOLIC PANEL, BASIC    Collection Time: 03/31/21  5:25 AM   Result Value Ref Range    Sodium 141 136 - 145 mmol/L    Potassium 3.8 3.5 - 5.1 mmol/L    Chloride 107 98 - 107 mmol/L    CO2 29 21 - 32 mmol/L    Anion gap 5 (L) 7 - 16 mmol/L    Glucose 107 (H) 65 - 100 mg/dL    BUN 12 8 - 23 MG/DL    Creatinine 1.00 0.6 - 1.0 MG/DL    GFR est AA >60 >60 ml/min/1.73m2    GFR est non-AA 57 (L) >60 ml/min/1.73m2    Calcium 9.3 8.3 - 10.4 MG/DL       Assessment/ Plan:     Principal Problem:    Acute metabolic encephalopathy (0/66/1075)    Active Problems:    Schizophrenia ([facility] [address] (3/24/2021)      Dementia ([facility] [address] (3/24/2021)      Severe protein-calorie malnutrition ([facility] [address] (3/25/2021)    Per son, seems to be near her baseline. Has been declining since December of last year. Family wanting nursing home. UTI- Ceftriaxone 3/23-3/28    Hypokalemia - Resolved. Schizophrenia -  austedo 12mg daily. Discussed poor oral intake. S/p PEG tube 3/29. Tolerating tube feedings. Speech therapy has not cleared for regular or thick liquids due to patient pocketing her food. Looking into nursing homes.      DVT prophylaxis - Lovenox  Signed By: Dandy Serrano DO     March 31, 2021

## 2021-03-31 NOTE — PROGRESS NOTES
HEATHER from [facility]. Patient in stable condition with resps even/unlabored. NAD noted. Patient on room air. Safety measures noted. Will continue to monitor per policy.

## 2021-03-31 NOTE — PROGRESS NOTES
Patient remains in stable condition with respirations even/unlabored. No acute distress noted. No needs noted or voiced at this time. Safety measures in place. Patient on room air. Sitter at bedside. Mitts and lab belt in place. Tube feeding in progress. Call light remains within reach. Preparing to give report to oncoming shift.

## 2021-04-01 LAB
ANION GAP SERPL CALC-SCNC: 5 MMOL/L (ref 7–16)
BUN SERPL-MCNC: 10 MG/DL (ref 8–23)
CALCIUM SERPL-MCNC: 9.2 MG/DL (ref 8.3–10.4)
CHLORIDE SERPL-SCNC: 107 MMOL/L (ref 98–107)
CO2 SERPL-SCNC: 29 MMOL/L (ref 21–32)
CREAT SERPL-MCNC: 0.89 MG/DL (ref 0.6–1)
GLUCOSE SERPL-MCNC: 100 MG/DL (ref 65–100)
POTASSIUM SERPL-SCNC: 3.8 MMOL/L (ref 3.5–5.1)
SODIUM SERPL-SCNC: 141 MMOL/L (ref 136–145)

## 2021-04-01 PROCEDURE — 36415 COLL VENOUS BLD VENIPUNCTURE: CPT

## 2021-04-01 PROCEDURE — 74011250636 HC RX REV CODE- 250/636: Performed by: INTERNAL MEDICINE

## 2021-04-01 PROCEDURE — 65270000029 HC RM PRIVATE

## 2021-04-01 PROCEDURE — 97530 THERAPEUTIC ACTIVITIES: CPT

## 2021-04-01 PROCEDURE — 80048 BASIC METABOLIC PNL TOTAL CA: CPT

## 2021-04-01 RX ORDER — FLUPHENAZINE DECANOATE 25 MG/ML
50 INJECTION, SOLUTION INTRAMUSCULAR; SUBCUTANEOUS EVERY 2 WEEKS
Status: DISCONTINUED | OUTPATIENT
Start: 2021-04-01 | End: 2021-04-08 | Stop reason: HOSPADM

## 2021-04-01 RX ORDER — FLUPHENAZINE HYDROCHLORIDE 2.5 MG/ML
2.5 INJECTION, SOLUTION INTRAMUSCULAR EVERY 2 WEEKS
Status: DISCONTINUED | OUTPATIENT
Start: 2021-04-01 | End: 2021-04-01

## 2021-04-01 RX ADMIN — ENOXAPARIN SODIUM 40 MG: 40 INJECTION SUBCUTANEOUS at 08:47

## 2021-04-01 RX ADMIN — Medication 10 ML: at 09:36

## 2021-04-01 RX ADMIN — FLUPHENAZINE DECANOATE 50 MG: 25 INJECTION, SOLUTION INTRAMUSCULAR; SUBCUTANEOUS at 17:08

## 2021-04-01 RX ADMIN — Medication 10 ML: at 22:30

## 2021-04-01 RX ADMIN — Medication 10 ML: at 13:49

## 2021-04-01 NOTE — PROGRESS NOTES
Patient in bed resting quietly with sitter at the bedside. Safety measures in place. No signs of distress. Report given to Christy Wyman RN.

## 2021-04-01 NOTE — PROGRESS NOTES
HEATHER from [facility]. Patient in stable condition with resps even/unlabored. NAD noted. Patient on room air. Family at bedside. Tube feeding in progress. Safety measures noted. Will continue to monitor per policy.

## 2021-04-01 NOTE — PROGRESS NOTES
Comprehensive Nutrition Assessment    Type and Reason for Visit: Reassess  Tube Feeding Management (Hospitalists)    Nutrition Recommendations/Plan:   · Enteral Nutrition: once PEG is appropriate for use for TF per GI  · Continue Jevity 1.5 via PEG at goal rate of 45ml/hour. · Water flush 115ml Q4H.    · Provides 1485 kcal (100% estimated calorie needs), 63 grams protein (100% estimated protein needs) ONR 3811DP free fluid (1ml/kcal) calculations based on 22 hours infusion. · Vitamin and Mineral Supplement Therapy:  · Electrolyte management replacement protocol active.   · Labs:  · BMP daily, Mg and Phos MWF. Malnutrition Assessment:  Malnutrition Status: Severe malnutrition  Context: Chronic illness  Findings of clinical characteristics of malnutrition:   Energy Intake:  Mild decrease in energy intake (specify)(poor po intake reported per son)  Weight Loss:  7.0 - Greater than 7.5% over 3 months(133lbs at [facility] visit; 13% in 2 months)     Body Fat Loss:  7 - Severe body fat loss, Triceps, Orbital   Muscle Mass Loss:  1 - Mild muscle mass loss, Calf (gastrocnemius), Temples (temporalis), Thigh (quadraceps), Clavicles (pectoralis &deltoids)  Fluid Accumulation:  Unable to assess,     Strength:  Not performed     Nutrition Assessment:   Nutrition History: Unable to assess. Per SLP note on 3/24, son reports pt has had poor po intake since 3/18. Pt is taking a bite or 2 of foods such as mashed potatoes. Noted ED visit at [facility] on 1/2/21 due to cognitive decline. Per ED note, pt's son Chayito Urbano, stated pt was fine until December 26th and pt was unable to walk well, not eating, and unable to carry on conversations with family members. Noted pt has had a significant wt loss since office visit at [facility] on 1/28/21. Nutrition Background: Pt presents due to several weeks of progressive confusion and worsening agitation. PMH notable for dementia and schizophrenia.   Daily Update: Tolerating TF at goal. At times has needed mittens to prevent pulling on NG tube but during visit did not pull tube and mittens were not on. Son in room. Abdominal Status (last documented): (PEG Tube) abdomen with Active  bowel sounds. Last BM 04/01/21. Pertinent Medications: prn miralax (none needed)  Pertinent Labs: reviewed. Nutrition Related Findings:   Bridled NGT placed 3/25; TF started 3/26; 20FR PEG placed 3/20, TF started 3/30 and tolerated at goal.       Current Nutrition Therapies:  DIET NUTRITIONAL SUPPLEMENTS All Meals; Ensure Clear  DIET CLEAR LIQUID  DIET TUBE FEEDING Please open for rate and flush. Start once GI approves for PEG to be used for TF. Current Tube Feeding (TF) Orders:   · Feeding Route: Nasogastric  · Formula: Jevity 1.5  · Schedule:Continuous    · Regimen: 45 ml/hr  · Additives/Modulars: (none ordered)  · Water Flushes: 115ml Q4H  · Current TF & Flush Orders Provides: at goal  · Goal TF & Flush Orders Provides: 1485 kcal (100% estimated calorie needs), 63 grams protein (100% estimated protein needs) EVV 4127CO free fluid (1ml/kcal) calculations based on 22 hours infusion. Current Intake:   Average Meal Intake: 0% Average Supplement Intake: 0%      Anthropometric Measures:  Height: 5' 3\" (160 cm)  Current Body Wt: 53.3 kg (117 lb 8.1 oz)(3/29), Weight source: Standing scale  BMI: 20.8, Underweight (BMI less than 22) age over 72  Admission Body Weight: 115 lb 8.3 oz(standing scale; 3/24)  Ideal Body Weight (lbs) (Calculated): 115 lbs (52 kg), 96.3 %  Usual Body Wt: 60.4 kg (133 lb 2.5 oz)(1/28/21;  [address] [place] [address]), Percent weight change: -13.2          Edema: Facial: No Edema (3/31/2021  8:15 PM)     Estimated Daily Nutrient Needs:  Energy (kcal/day): 8287-7340 (Kcal/kg(25-30), Weight Used: Current(52.4kg))  Protein (g/day): 52-63(1-1.2gm/kg) Weight Used: (Current(52.4kg))  Fluid (ml/day):   (1 ml/kcal)    Nutrition Diagnosis:   · Severe malnutrition, In context of chronic illness related to cognitive or neurological impairment, inadequate protein-energy intake as evidenced by (criteria provided in malnutrition assessment above)    · Inadequate oral intake related to cognitive or neurological impairment, swallowing difficulty as evidenced by (PEG for primary nutrition)    Nutrition Interventions:   Food and/or Nutrient Delivery: Continue current diet, Continue tube feeding     Coordination of Nutrition Care: Continue to monitor while inpatient  Plan of Care discussed with Jez Cantu RN    Goals:   Previous Goal Met: Goal(s) achieved  Active Goal: Maintain estimated nutrition needs via EN during admission    Nutrition Monitoring and Evaluation:      Food/Nutrient Intake Outcomes: Enteral nutrition intake/tolerance, Diet advancement/tolerance, Food and nutrient intake, Supplement intake       Discharge Planning:    Enteral nutrition    Marialuisa Garcia, [address] [place] [address],   Contact: [phone]

## 2021-04-01 NOTE — PROGRESS NOTES
ACUTE PHYSICAL THERAPY GOALS:  (Developed with and agreed upon by patient and/or caregiver. )  LTG:  (1.)Ms. Garo Medeiros will move from supine to sit and sit to supine, scoot up and down and roll side to side in bed INDEPENDENTLY with bed flat within 7 treatment day(s). (2.)Ms. Garo Medeiros will transfer from bed to chair and chair to bed with SUPERVISION using the least restrictive device within 7 treatment day(s). (3.)Ms. Garo Medeiros will ambulate with SUPERVISION for 150 feet with the least restrictive device within 7 treatment day(s). (4.)Ms. Garo Medeiros will ascend and descend 10 steps with MINIMAL ASSIST using handrail(s) and/or handheld assist within 7 days. PHYSICAL THERAPY: Daily Note and PM Treatment Day # 5    Low Ann is a 67 y.o. female   PRIMARY DIAGNOSIS: Acute metabolic encephalopathy  Acute metabolic encephalopathy [O65.69]    3 Days Post-Op    ASSESSMENT:     REHAB RECOMMENDATIONS: CURRENT LEVEL OF FUNCTION:  (Most Recently Demonstrated)   Recommendation to date pending progress:  Setting:   Short-term Rehab  Equipment:    To Be Determined Bed Mobility:   Minimal Assistance  Sit to Stand:   Moderate Assistance to min A  Transfers:   Moderate Assistance to min A  Gait/Mobility:   Minimal Assistance      ASSESSMENT:  Ms. Garo Medeiros is supine, son present, lap belt and B mitts donned. Pt mostly non verbal, does respond at times with limited response. PT doffed B mitts, noted pt brief saturated. Pt min A bed mobility, min/mod A for sit to stand transfer. Pt with fair static standing balance for total A with cleaning and brief change. Pt ambulated with HHA and close contact min A for 250', required constant verbal cues, unsteady gait, shuffled steps. Pt reports L hip pain with ambulation during this time. Pt fall risk with activity. Pt making slow progress toward goals. Pt left up in chair with son in room, left mitts off due to son with patient, RN notified.        SUBJECTIVE:   Ms. Garo Medeiros with one word responses at times    SOCIAL HISTORY/ LIVING ENVIRONMENT: Spoke with son (evangelist) who reports pt lives with him and another son and daughter in two story home. His siblings are both moving out soon and he works. Patients bedroom is upstairs; she has been unable to navigate steps for the past 3-4 months so has bee staying upstairs. Needs some assistance with ADLs. Denies DME use or falls prior to admission. States patients speech and memory have been drastically declining.    Home Environment: Private residence  One/Two Story Residence: Two story  Living Alone: No  Support Systems: Child(segundo)  OBJECTIVE:     PAIN: VITAL SIGNS: LINES/DRAINS:   Pre Treatment: Pain Screen  Pain Scale 1: FLACC  Pain Intensity 1: 0(does state L hip pain with ambulation)  Post Treatment: 0/10 FLACC Vital Signs  O2 Device: Room air PEG  O2 Device: Room air     MOBILITY: I Mod I S SBA CGA Min Mod Max Total  NT x2 Comments:   Bed Mobility    Rolling [] [] [] [] [] [x] [] [] [] [] []    Supine to Sit [] [] [] [] [] [x] [x] [] [] [] []    Scooting [] [] [] [] [] [x] [] [] [] [] []    Sit to Supine [] [] [] [] [] [] [] [] [] [x] [] chair   Transfers    Sit to Stand [] [] [] [] [] [x] [x] [] [] [] []    Bed to Chair [] [] [] [] [] [x] [] [] [] [] []    Stand to Sit [] [] [] [] [] [x] [] [] [] [] []    I=Independent, Mod I=Modified Independent, S=Supervision, SBA=Standby Assistance, CGA=Contact Guard Assistance,   Min=Minimal Assistance, Mod=Moderate Assistance, Max=Maximal Assistance, Total=Total Assistance, NT=Not Tested    BALANCE: Good Fair+ Fair Fair- Poor NT Comments   Sitting Static [] [x] [] [] [] []    Sitting Dynamic [] [x] [] [] [] []              Standing Static [] [] [x] [] [] []    Standing Dynamic [] [] [x] [] [] []      GAIT: I Mod I S SBA CGA Min Mod Max Total  NT x2 Comments:   Level of Assistance [] [] [] [] [] [x] [x] [] [] [] []    Distance 250 ft    DME Gait Belt and HHA x 1, MIN A     Gait Quality Shuffled, short steps Weightbearing  Status N/A     I=Independent, Mod I=Modified Independent, S=Supervision, SBA=Standby Assistance, CGA=Contact Guard Assistance,   Min=Minimal Assistance, Mod=Moderate Assistance, Max=Maximal Assistance, Total=Total Assistance, NT=Not Tested    PLAN:   FREQUENCY/DURATION: PT Plan of Care: 3 times/week for duration of hospital stay or until stated goals are met, whichever comes first.  TREATMENT:     TREATMENT:   ($$ Therapeutic Activity: 23-37 mins    )  Therapeutic Activity (23 Minutes): Therapeutic activity included Supine to Sit, Scooting, Transfer Training, Ambulation on level ground, Sitting balance  and Standing balance to improve functional Mobility, Strength and Activity tolerance.      TREATMENT GRID:  N/A    AFTER TREATMENT POSITION/PRECAUTIONS:  Alarm Activated, Chair, Needs within reach, Restraints  and Visitors at bedside    INTERDISCIPLINARY COLLABORATION:  RN/PCT and PT/PTA    TOTAL TREATMENT DURATION:  PT Patient Time In/Time Out  Time In: 7108  Time Out: [facility]

## 2021-04-01 NOTE — PROGRESS NOTES
Progress Note    Patient: Luciana Bowser MRN: [id number]  SSN: [id number]    YOB: 1945  Age: 67 y.o. Sex: female      Admit Date: 3/22/2021    LOS: 10 days     Subjective:   As per prior documentation:    \"75 y. o. female with medical h/o dementia, schizophrenia who presented to [facility] ER with report of several weeks progressive confusion and worsening agitation/confusion/ shuffling gait for past 3 days. Patient is minimally conversant so history obtained from son at bedside. She lives with her son with other family helping at times. They are overwhelmed by her needs and are concerned for her safety due to her wandering behaviors. \" They would like her to be placed into a nursing home. UTI found. Urine culture with mixed colonies. Speech therapy has not cleared for regular or thick liquids due to patient pocketing her food. Spoken to family who want PEG tube. NG tube feedings started 3/25. PEG tube placed 3/29. Tolerating tube feedings well. Has psych medications for outburst. Since obtaining her home medications, agitated much improved. Currently in mittens. Not speaking this AM. No acute distress. \"      April 1, 2021- patient seen and evaluated. No adverse overnight events reported by nursing. She is afebrile. Son at bedside all questions answered. He would like Prolixin given if possible. Pharmacy consulted. The patient herself is unable to give a history secondary to her baseline medical condition.     Current Facility-Administered Medications   Medication Dose Route Frequency    deutetrabenazine tab 12 mg (Patient Supplied)  12 mg Nasogastric Tube BID    NUTRITIONAL SUPPORT ELECTROLYTE PRN ORDERS   Does Not Apply PRN    HYDROcodone-acetaminophen (NORCO) 5-325 mg per tablet 1 Tab  1 Tab Oral Q4H PRN    morphine injection 1 mg  1 mg IntraVENous Q4H PRN    sodium chloride (NS) flush 5-40 mL  5-40 mL IntraVENous Q8H    sodium chloride (NS) flush 5-40 mL  5-40 mL IntraVENous PRN  acetaminophen (TYLENOL) tablet 650 mg  650 mg Oral Q6H PRN    Or    acetaminophen (TYLENOL) suppository 650 mg  650 mg Rectal Q6H PRN    polyethylene glycol (MIRALAX) packet 17 g  17 g Oral DAILY PRN    promethazine (PHENERGAN) tablet 12.5 mg  12.5 mg Oral Q6H PRN    Or    ondansetron (ZOFRAN) injection 4 mg  4 mg IntraVENous Q6H PRN    enoxaparin (LOVENOX) injection 40 mg  40 mg SubCUTAneous DAILY    hydrALAZINE (APRESOLINE) 20 mg/mL injection 20 mg  20 mg IntraVENous Q6H PRN       Objective:     Vitals:    03/31/21 1928 03/31/21 2242 04/01/21 0230 04/01/21 0610   BP: (!) 148/75 124/85 125/81 139/89   Pulse: 93 90 85 90   Resp: 20 20 19 20   Temp: 98.1 °F (36.7 °C) 98.5 °F (36.9 °C) 98.1 °F (36.7 °C) 97.8 °F (36.6 °C)   SpO2: 98% 97% 98% 100%   Weight:       Height:             Intake and Output:  Current Shift: No intake/output data recorded. Last three shifts: 03/30 1901 - 04/01 0700  In: 320 [P.O.:60]  Out: 900 [Urine:400]    Physical Exam:   General:  Alert, no distress, trying to get out of chair. Will answer a few questions for me. Eyes:  Conjunctivae/corneas clear. Ears:  Normal TMs and external ear canals both ears. Nose: Nares normal. Septum midline. Mouth/Throat: Poor dentition   Neck:  no JVD. Back:   deferred. Lungs:   Clear to auscultation bilaterally. Heart:  Regular rate and rhythm, S1, S2 normal   Abdomen:   Soft, non-tender. Bowel sounds normal. PEG tube in place   Extremities: Muscle wasting, limited ROM   Pulses: 2+ and symmetric all extremities. Skin: Skin color, texture, turgor normal. No rashes or lesions   Lymph nodes: Cervical, supraclavicular, and axillary nodes normal.   Neurologic:  No noted outbursts.         Lab/Data Review:    Recent Results (from the past 24 hour(s))   METABOLIC PANEL, BASIC    Collection Time: 04/01/21  5:32 AM   Result Value Ref Range    Sodium 141 136 - 145 mmol/L    Potassium 3.8 3.5 - 5.1 mmol/L    Chloride 107 98 - 107 mmol/L    CO2 29 21 - 32 mmol/L    Anion gap 5 (L) 7 - 16 mmol/L    Glucose 100 65 - 100 mg/dL    BUN 10 8 - 23 MG/DL    Creatinine 0.89 0.6 - 1.0 MG/DL    GFR est AA >60 >60 ml/min/1.73m2    GFR est non-AA >60 >60 ml/min/1.73m2    Calcium 9.2 8.3 - 10.4 MG/DL       Assessment/ Plan:   April 1, 2021-this assessment and plan remains unchanged unless specifically delineated below. Principal Problem:    Acute metabolic encephalopathy (1/21/0453)    Active Problems:    Schizophrenia ([facility] [address] (3/24/2021)      Dementia ([facility] [address] (3/24/2021)      Severe protein-calorie malnutrition ([facility] [address] (3/25/2021)    Acute metabolic encephalopathy-Per son, seems to be near her baseline. Has been declining since December of last year. Family wanting nursing home. UTI- Ceftriaxone 3/23-3/28    Hypokalemia - Resolved. Schizophrenia -  austedo 12mg daily. Discussed with pharmacy dosing her Prolixin or giving her a pharmacy equivalent substitution. This chart was due on March 31, 2021    Poor oral intake: S/p PEG tube 3/29. Tolerating tube feedings. Speech therapy has not cleared for regular or thick liquids due to patient pocketing her food. Case management is aware and is looking into nursing homes.      DVT prophylaxis - Lovenox  Signed By: Jian Jon MD     April 1, 2021

## 2021-04-02 LAB
ANION GAP SERPL CALC-SCNC: 4 MMOL/L (ref 7–16)
BUN SERPL-MCNC: 14 MG/DL (ref 8–23)
CALCIUM SERPL-MCNC: 9.3 MG/DL (ref 8.3–10.4)
CHLORIDE SERPL-SCNC: 108 MMOL/L (ref 98–107)
CO2 SERPL-SCNC: 30 MMOL/L (ref 21–32)
COVID-19 RAPID TEST, COVR: NOT DETECTED
CREAT SERPL-MCNC: 0.94 MG/DL (ref 0.6–1)
GLUCOSE SERPL-MCNC: 96 MG/DL (ref 65–100)
MAGNESIUM SERPL-MCNC: 2.2 MG/DL (ref 1.8–2.4)
PHOSPHATE SERPL-MCNC: 3.6 MG/DL (ref 2.3–3.7)
POTASSIUM SERPL-SCNC: 4 MMOL/L (ref 3.5–5.1)
SODIUM SERPL-SCNC: 142 MMOL/L (ref 136–145)
SOURCE, COVRS: NORMAL

## 2021-04-02 PROCEDURE — 84100 ASSAY OF PHOSPHORUS: CPT

## 2021-04-02 PROCEDURE — 87635 SARS-COV-2 COVID-19 AMP PRB: CPT

## 2021-04-02 PROCEDURE — 80048 BASIC METABOLIC PNL TOTAL CA: CPT

## 2021-04-02 PROCEDURE — 74011250636 HC RX REV CODE- 250/636: Performed by: INTERNAL MEDICINE

## 2021-04-02 PROCEDURE — 83735 ASSAY OF MAGNESIUM: CPT

## 2021-04-02 PROCEDURE — 65270000029 HC RM PRIVATE

## 2021-04-02 PROCEDURE — 36415 COLL VENOUS BLD VENIPUNCTURE: CPT

## 2021-04-02 PROCEDURE — 77030018798 HC PMP KT ENTRL FED COVD -A

## 2021-04-02 RX ADMIN — Medication 5 ML: at 08:05

## 2021-04-02 RX ADMIN — Medication 10 ML: at 20:57

## 2021-04-02 RX ADMIN — Medication 10 ML: at 14:04

## 2021-04-02 RX ADMIN — ENOXAPARIN SODIUM 40 MG: 40 INJECTION SUBCUTANEOUS at 09:32

## 2021-04-02 NOTE — PROGRESS NOTES
Progress Note    Patient: Elisa Kingston MRN: [id number]  SSN: [id number]    YOB: 1945  Age: 67 y.o. Sex: female      Admit Date: 3/22/2021    LOS: 11 days     Subjective:   As per prior documentation:    \"75 y. o. female with medical h/o dementia, schizophrenia who presented to [facility] ER with report of several weeks progressive confusion and worsening agitation/confusion/ shuffling gait for past 3 days. Patient is minimally conversant so history obtained from son at bedside. She lives with her son with other family helping at times. They are overwhelmed by her needs and are concerned for her safety due to her wandering behaviors. \" They would like her to be placed into a nursing home. UTI found. Urine culture with mixed colonies. Speech therapy has not cleared for regular or thick liquids due to patient pocketing her food. Spoken to family who want PEG tube. NG tube feedings started 3/25. PEG tube placed 3/29. Tolerating tube feedings well. Has psych medications for outburst. Since obtaining her home medications, agitated much improved. Currently in mittens. Not speaking this AM. No acute distress. \"      April 1, 2021- patient seen and evaluated. No adverse overnight events reported by nursing. She is afebrile. Son at bedside all questions answered. He would like Prolixin given if possible. Pharmacy consulted. The patient herself is unable to give a history secondary to her baseline medical condition. April 2, 2021-patient seen and evaluated. No adverse overnight events reported by nursing. She remains afebrile. She did get her Prolixin yesterday after case management arranged for her to come from her psych facility. No nausea vomiting shortness of breath or fevers. Son again visiting at bedside. All questions answered.     Current Facility-Administered Medications   Medication Dose Route Frequency    deutetrabenazine tab 12 mg (Patient Supplied)  12 mg PEG Tube BID    fluPHENAZine decanoate (PROLIXIN) 25 mg/mL injection 50 mg (Patient Supplied)  50 mg IntraMUSCular EVERY 2 WEEKS    NUTRITIONAL SUPPORT ELECTROLYTE PRN ORDERS   Does Not Apply PRN    HYDROcodone-acetaminophen (NORCO) 5-325 mg per tablet 1 Tab  1 Tab Oral Q4H PRN    morphine injection 1 mg  1 mg IntraVENous Q4H PRN    sodium chloride (NS) flush 5-40 mL  5-40 mL IntraVENous Q8H    sodium chloride (NS) flush 5-40 mL  5-40 mL IntraVENous PRN    acetaminophen (TYLENOL) tablet 650 mg  650 mg Oral Q6H PRN    Or    acetaminophen (TYLENOL) suppository 650 mg  650 mg Rectal Q6H PRN    polyethylene glycol (MIRALAX) packet 17 g  17 g Oral DAILY PRN    promethazine (PHENERGAN) tablet 12.5 mg  12.5 mg Oral Q6H PRN    Or    ondansetron (ZOFRAN) injection 4 mg  4 mg IntraVENous Q6H PRN    enoxaparin (LOVENOX) injection 40 mg  40 mg SubCUTAneous DAILY    hydrALAZINE (APRESOLINE) 20 mg/mL injection 20 mg  20 mg IntraVENous Q6H PRN       Objective:     Vitals:    04/02/21 0544 04/02/21 0732 04/02/21 1110 04/02/21 1500   BP:  121/82 (!) 158/95 122/80   Pulse:  90 92 (!) 111   Resp:  19 19 19   Temp:  97.7 °F (36.5 °C) 97.8 °F (36.6 °C) 98.3 °F (36.8 °C)   SpO2:  100% 97% 91%   Weight: 53.7 kg (118 lb 4.8 oz)      Height:             Intake and Output:  Current Shift: 04/02 0701 - 04/02 1900  In: 175   Out: -   Last three shifts: 03/31 1901 - 04/02 0700  In: 115   Out: 450 [Urine:200]    Physical Exam:   General:  Alert, no distress, trying to get out of chair. Will answer a few questions for me. Eyes:  Conjunctivae/corneas clear. Ears:  Normal TMs and external ear canals both ears. Nose: Nares normal. Septum midline. Mouth/Throat: Poor dentition   Neck:  no JVD. Back:   deferred. Lungs:   Clear to auscultation bilaterally. Heart:  Regular rate and rhythm, S1, S2 normal   Abdomen:   Soft, non-tender.  Bowel sounds normal. PEG tube in place   Extremities: Muscle wasting, limited ROM   Pulses: 2+ and symmetric all extremities. Skin: Skin color, texture, turgor normal. No rashes or lesions   Lymph nodes: Cervical, supraclavicular, and axillary nodes normal.   Neurologic:  No noted outbursts. Lab/Data Review:    Recent Results (from the past 24 hour(s))   PHOSPHORUS    Collection Time: 04/02/21  6:15 AM   Result Value Ref Range    Phosphorus 3.6 2.3 - 3.7 MG/DL   MAGNESIUM    Collection Time: 04/02/21  6:15 AM   Result Value Ref Range    Magnesium 2.2 1.8 - 2.4 mg/dL   METABOLIC PANEL, BASIC    Collection Time: 04/02/21  6:15 AM   Result Value Ref Range    Sodium 142 136 - 145 mmol/L    Potassium 4.0 3.5 - 5.1 mmol/L    Chloride 108 (H) 98 - 107 mmol/L    CO2 30 21 - 32 mmol/L    Anion gap 4 (L) 7 - 16 mmol/L    Glucose 96 65 - 100 mg/dL    BUN 14 8 - 23 MG/DL    Creatinine 0.94 0.6 - 1.0 MG/DL    GFR est AA >60 >60 ml/min/1.73m2    GFR est non-AA >60 >60 ml/min/1.73m2    Calcium 9.3 8.3 - 10.4 MG/DL   COVID-19 RAPID TEST    Collection Time: 04/02/21 10:03 AM   Result Value Ref Range    Specimen source NASAL      COVID-19 rapid test Not detected NOTD         Assessment/ Plan:   April 2, 2021-this assessment and plan remains unchanged unless specifically delineated below. Principal Problem:    Acute metabolic encephalopathy (5/50/2163)    Active Problems:    Schizophrenia ([facility] [address] (3/24/2021)      Dementia ([facility] [address] (3/24/2021)      Severe protein-calorie malnutrition ([facility] [address] (3/25/2021)    Acute metabolic encephalopathy- Per son, seems to be near her baseline. She has been declining since December of last year. Family wanting nursing home. UTI- Ceftriaxone 3/23-3/28    Hypokalemia - Resolved. Schizophrenia -  austedo 12mg daily. She received a dose of Prolixin on April 1, 2021. This medication was due on March 31, 2021. Poor oral intake: S/p PEG tube 3/29. Tolerating tube feedings. Speech therapy has not cleared for regular or thick liquids due to patient pocketing her food.     Case management is aware and is looking into nursing homes for placement. Disposition:  To be determined    DVT prophylaxis - Lovenox  Signed By: Usha Hill MD     April 2, 2021

## 2021-04-02 NOTE — PROGRESS NOTES
HEATHER from [facility], [address] [place] [address]. Patient in stable condition with resps even/unlabored. NAD noted. Patient on room air. Feeding in progress. Safety measures noted. Will continue to monitor per policy.

## 2021-04-02 NOTE — PROGRESS NOTES
good visit with patient and family   patient did not speak but did look at the [address]   family was very thankful for the support

## 2021-04-02 NOTE — PROGRESS NOTES
BIN, CM:  Ailyn huang [place] to come visit with patient and son today. Will assess and consider admitting. Will await on decision. Case Management will continue to follow.

## 2021-04-02 NOTE — PROGRESS NOTES
Patient in bed resting quietly, daughter and son at the beside. Safety measures in place. No needs verbalized at this time. Will continue to monitor.

## 2021-04-03 LAB
ANION GAP SERPL CALC-SCNC: 5 MMOL/L (ref 7–16)
BUN SERPL-MCNC: 16 MG/DL (ref 8–23)
CALCIUM SERPL-MCNC: 9.6 MG/DL (ref 8.3–10.4)
CHLORIDE SERPL-SCNC: 107 MMOL/L (ref 98–107)
CO2 SERPL-SCNC: 29 MMOL/L (ref 21–32)
CREAT SERPL-MCNC: 0.97 MG/DL (ref 0.6–1)
GLUCOSE SERPL-MCNC: 93 MG/DL (ref 65–100)
POTASSIUM SERPL-SCNC: 4.2 MMOL/L (ref 3.5–5.1)
SODIUM SERPL-SCNC: 141 MMOL/L (ref 136–145)

## 2021-04-03 PROCEDURE — 65270000029 HC RM PRIVATE

## 2021-04-03 PROCEDURE — 36415 COLL VENOUS BLD VENIPUNCTURE: CPT

## 2021-04-03 PROCEDURE — 74011250637 HC RX REV CODE- 250/637: Performed by: INTERNAL MEDICINE

## 2021-04-03 PROCEDURE — 80048 BASIC METABOLIC PNL TOTAL CA: CPT

## 2021-04-03 PROCEDURE — 74011250636 HC RX REV CODE- 250/636: Performed by: INTERNAL MEDICINE

## 2021-04-03 RX ORDER — DIVALPROEX SODIUM 125 MG/1
125 CAPSULE, COATED PELLETS ORAL EVERY 8 HOURS
Status: DISCONTINUED | OUTPATIENT
Start: 2021-04-03 | End: 2021-04-04

## 2021-04-03 RX ORDER — HALOPERIDOL 5 MG/ML
5 INJECTION INTRAMUSCULAR ONCE
Status: COMPLETED | OUTPATIENT
Start: 2021-04-03 | End: 2021-04-03

## 2021-04-03 RX ADMIN — ENOXAPARIN SODIUM 40 MG: 40 INJECTION SUBCUTANEOUS at 09:44

## 2021-04-03 RX ADMIN — Medication 10 ML: at 16:13

## 2021-04-03 RX ADMIN — Medication 10 ML: at 05:27

## 2021-04-03 RX ADMIN — DIVALPROEX SODIUM 125 MG: 125 CAPSULE, COATED PELLETS ORAL at 16:05

## 2021-04-03 RX ADMIN — Medication 10 ML: at 21:13

## 2021-04-03 RX ADMIN — DIVALPROEX SODIUM 125 MG: 125 CAPSULE, COATED PELLETS ORAL at 21:13

## 2021-04-03 RX ADMIN — DIVALPROEX SODIUM 125 MG: 125 CAPSULE, COATED PELLETS ORAL at 09:45

## 2021-04-03 RX ADMIN — HALOPERIDOL LACTATE 5 MG: 5 INJECTION, SOLUTION INTRAMUSCULAR at 09:44

## 2021-04-03 NOTE — PROGRESS NOTES
Report received from night RN. Patient is in bed with HOB elevated. Patient is confused. Patient asked after her sons. Stated sons name. No family at bedside today. Bed in low position. Bed alarm on. Patient requested coffee today. Call bell in reach.

## 2021-04-03 NOTE — PROGRESS NOTES
Progress Note    Patient: Cm Love MRN: [id number]  SSN: [id number]    YOB: 1945  Age: 67 y.o. Sex: female      Admit Date: 3/22/2021    LOS: 12 days     Subjective:   As per prior documentation:    \"75 y. o. female with medical h/o dementia, schizophrenia who presented to [facility] ER with report of several weeks progressive confusion and worsening agitation/confusion/ shuffling gait for past 3 days. Patient is minimally conversant so history obtained from son at bedside. She lives with her son with other family helping at times. They are overwhelmed by her needs and are concerned for her safety due to her wandering behaviors. \" They would like her to be placed into a nursing home. UTI found. Urine culture with mixed colonies. Speech therapy has not cleared for regular or thick liquids due to patient pocketing her food. Spoken to family who want PEG tube. NG tube feedings started 3/25. PEG tube placed 3/29. Tolerating tube feedings well. Has psych medications for outburst. Since obtaining her home medications, agitated much improved. Currently in mittens. Not speaking this AM. No acute distress. \"      April 1, 2021- patient seen and evaluated. No adverse overnight events reported by nursing. She is afebrile. Son at bedside all questions answered. He would like Prolixin given if possible. Pharmacy consulted. The patient herself is unable to give a history secondary to her baseline medical condition. April 2, 2021-patient seen and evaluated. No adverse overnight events reported by nursing. She remains afebrile. She did get her Prolixin yesterday after case management arranged for her to come from her psych facility. No nausea vomiting shortness of breath or fevers. Son again visiting at bedside. All questions answered. April 3 2021-patient seen and evaluated. No adverse overnight events reported by nursing staff.   This a.m. she is attempting to get out of bed and asking me to pull her up. Can be redirected. Current Facility-Administered Medications   Medication Dose Route Frequency    divalproex (DEPAKOTE SPRINKLE) capsule 125 mg  125 mg Oral Q8H    deutetrabenazine tab 12 mg (Patient Supplied)  12 mg PEG Tube BID    fluPHENAZine decanoate (PROLIXIN) 25 mg/mL injection 50 mg (Patient Supplied)  50 mg IntraMUSCular EVERY 2 WEEKS    NUTRITIONAL SUPPORT ELECTROLYTE PRN ORDERS   Does Not Apply PRN    HYDROcodone-acetaminophen (NORCO) 5-325 mg per tablet 1 Tab  1 Tab Oral Q4H PRN    morphine injection 1 mg  1 mg IntraVENous Q4H PRN    sodium chloride (NS) flush 5-40 mL  5-40 mL IntraVENous Q8H    sodium chloride (NS) flush 5-40 mL  5-40 mL IntraVENous PRN    acetaminophen (TYLENOL) tablet 650 mg  650 mg Oral Q6H PRN    Or    acetaminophen (TYLENOL) suppository 650 mg  650 mg Rectal Q6H PRN    polyethylene glycol (MIRALAX) packet 17 g  17 g Oral DAILY PRN    promethazine (PHENERGAN) tablet 12.5 mg  12.5 mg Oral Q6H PRN    Or    ondansetron (ZOFRAN) injection 4 mg  4 mg IntraVENous Q6H PRN    enoxaparin (LOVENOX) injection 40 mg  40 mg SubCUTAneous DAILY    hydrALAZINE (APRESOLINE) 20 mg/mL injection 20 mg  20 mg IntraVENous Q6H PRN       Objective:     Vitals:    04/02/21 2324 04/03/21 0445 04/03/21 0739 04/03/21 1127   BP: (!) 156/94 (!) 157/89 136/83 128/84   Pulse: 98 97 90 92   Resp: 20 20 18 18   Temp: 97.9 °F (36.6 °C) 98.1 °F (36.7 °C) 97.9 °F (36.6 °C) 97.4 °F (36.3 °C)   SpO2: 92% 91% 96% 97%   Weight:       Height:             Intake and Output:  Current Shift: 04/03 0701 - 04/03 1900  In: 115   Out: -   Last three shifts: 04/01 1901 - 04/03 0700  In: 405   Out: 0     Physical Exam:   General:  Alert, no distress, trying to get out of chair. Will answer a few questions for me. Eyes:  Conjunctivae/corneas clear. Ears:  Normal TMs and external ear canals both ears. Nose: Nares normal. Septum midline.    Mouth/Throat: Poor dentition   Neck:  no JVD.   Back:   deferred. Lungs:   Clear to auscultation bilaterally. Heart:  Regular rate and rhythm, S1, S2 normal   Abdomen:   Soft, non-tender. Bowel sounds normal. PEG tube in place   Extremities: Muscle wasting, limited ROM   Pulses: 2+ and symmetric all extremities. Skin: Skin color, texture, turgor normal. No rashes or lesions   Lymph nodes: Cervical, supraclavicular, and axillary nodes normal.   Neurologic:  No noted outbursts. Lab/Data Review:    Recent Results (from the past 24 hour(s))   METABOLIC PANEL, BASIC    Collection Time: 04/03/21  9:57 AM   Result Value Ref Range    Sodium 141 136 - 145 mmol/L    Potassium 4.2 3.5 - 5.1 mmol/L    Chloride 107 98 - 107 mmol/L    CO2 29 21 - 32 mmol/L    Anion gap 5 (L) 7 - 16 mmol/L    Glucose 93 65 - 100 mg/dL    BUN 16 8 - 23 MG/DL    Creatinine 0.97 0.6 - 1.0 MG/DL    GFR est AA >60 >60 ml/min/1.73m2    GFR est non-AA 59 (L) >60 ml/min/1.73m2    Calcium 9.6 8.3 - 10.4 MG/DL       Assessment/ Plan:   April 3, 2021-this assessment and plan remains unchanged unless specifically delineated below. Principal Problem:    Acute metabolic encephalopathy (4/43/7943)    Active Problems:    Schizophrenia ([facility] [address] (3/24/2021)      Dementia ([facility] [address] (3/24/2021)      Severe protein-calorie malnutrition ([facility] [address] (3/25/2021)    Acute metabolic encephalopathy- Per son, seems to be near her baseline. She has been declining since December of last year. Family wanting nursing home. UTI- Ceftriaxone 3/23-3/28    Hypokalemia - Resolved. Schizophrenia -  austedo 12mg daily. She received a dose of Prolixin on April 1, 2021. This medication was due on March 31, 2021. Poor oral intake: S/p PEG tube 3/29. Tolerating tube feedings. Speech therapy has not cleared for regular or thick liquids due to patient pocketing her food. Agitation-we will start her on low-dose Depakote     Case management is aware and is looking into nursing homes for placement. Disposition:  To be determined    DVT prophylaxis - Lovenox  Signed By: Ashely Lamar MD     April 3, 2021

## 2021-04-04 ENCOUNTER — ANESTHESIA (OUTPATIENT)
Dept: SURGERY | Age: 76
DRG: 689 | End: 2021-04-04
Payer: MEDICARE

## 2021-04-04 ENCOUNTER — ANESTHESIA EVENT (OUTPATIENT)
Dept: SURGERY | Age: 76
DRG: 689 | End: 2021-04-04
Payer: MEDICARE

## 2021-04-04 LAB
ANION GAP SERPL CALC-SCNC: 5 MMOL/L (ref 7–16)
BUN SERPL-MCNC: 18 MG/DL (ref 8–23)
CALCIUM SERPL-MCNC: 9.2 MG/DL (ref 8.3–10.4)
CHLORIDE SERPL-SCNC: 106 MMOL/L (ref 98–107)
CO2 SERPL-SCNC: 28 MMOL/L (ref 21–32)
CREAT SERPL-MCNC: 0.97 MG/DL (ref 0.6–1)
GLUCOSE SERPL-MCNC: 114 MG/DL (ref 65–100)
POTASSIUM SERPL-SCNC: 4.4 MMOL/L (ref 3.5–5.1)
SODIUM SERPL-SCNC: 139 MMOL/L (ref 136–145)

## 2021-04-04 PROCEDURE — 36415 COLL VENOUS BLD VENIPUNCTURE: CPT

## 2021-04-04 PROCEDURE — 74011000250 HC RX REV CODE- 250: Performed by: ANESTHESIOLOGY

## 2021-04-04 PROCEDURE — 2709999900 HC NON-CHARGEABLE SUPPLY

## 2021-04-04 PROCEDURE — 77030005122 HC CATH GASTMY PEG BSC -B: Performed by: ANESTHESIOLOGY

## 2021-04-04 PROCEDURE — 2709999900 HC NON-CHARGEABLE SUPPLY: Performed by: INTERNAL MEDICINE

## 2021-04-04 PROCEDURE — 77030012064: Performed by: INTERNAL MEDICINE

## 2021-04-04 PROCEDURE — 0DH63UZ INSERTION OF FEEDING DEVICE INTO STOMACH, PERCUTANEOUS APPROACH: ICD-10-PCS | Performed by: INTERNAL MEDICINE

## 2021-04-04 PROCEDURE — 50590 FRAGMENTING OF KIDNEY STONE: CPT | Performed by: INTERNAL MEDICINE

## 2021-04-04 PROCEDURE — 0DP68UZ REMOVAL OF FEEDING DEVICE FROM STOMACH, VIA NATURAL OR ARTIFICIAL OPENING ENDOSCOPIC: ICD-10-PCS | Performed by: INTERNAL MEDICINE

## 2021-04-04 PROCEDURE — 76060000032 HC ANESTHESIA 0.5 TO 1 HR: Performed by: INTERNAL MEDICINE

## 2021-04-04 PROCEDURE — 80048 BASIC METABOLIC PNL TOTAL CA: CPT

## 2021-04-04 PROCEDURE — 74011250636 HC RX REV CODE- 250/636: Performed by: INTERNAL MEDICINE

## 2021-04-04 PROCEDURE — 65270000029 HC RM PRIVATE

## 2021-04-04 PROCEDURE — 74011250637 HC RX REV CODE- 250/637: Performed by: INTERNAL MEDICINE

## 2021-04-04 PROCEDURE — 74011250636 HC RX REV CODE- 250/636: Performed by: ANESTHESIOLOGY

## 2021-04-04 RX ORDER — FAMOTIDINE 20 MG/1
20 TABLET, FILM COATED ORAL AS NEEDED
Status: CANCELLED | OUTPATIENT
Start: 2021-04-04

## 2021-04-04 RX ORDER — SODIUM CHLORIDE, SODIUM LACTATE, POTASSIUM CHLORIDE, CALCIUM CHLORIDE 600; 310; 30; 20 MG/100ML; MG/100ML; MG/100ML; MG/100ML
100 INJECTION, SOLUTION INTRAVENOUS CONTINUOUS
Status: DISCONTINUED | OUTPATIENT
Start: 2021-04-04 | End: 2021-04-04 | Stop reason: HOSPADM

## 2021-04-04 RX ORDER — PROPOFOL 10 MG/ML
INJECTION, EMULSION INTRAVENOUS
Status: DISCONTINUED | OUTPATIENT
Start: 2021-04-04 | End: 2021-04-04 | Stop reason: HOSPADM

## 2021-04-04 RX ORDER — DIVALPROEX SODIUM 125 MG/1
125 CAPSULE, COATED PELLETS ORAL 2 TIMES DAILY
Status: DISCONTINUED | OUTPATIENT
Start: 2021-04-04 | End: 2021-04-05

## 2021-04-04 RX ORDER — LIDOCAINE HYDROCHLORIDE 20 MG/ML
INJECTION, SOLUTION EPIDURAL; INFILTRATION; INTRACAUDAL; PERINEURAL AS NEEDED
Status: DISCONTINUED | OUTPATIENT
Start: 2021-04-04 | End: 2021-04-04 | Stop reason: HOSPADM

## 2021-04-04 RX ORDER — PROPOFOL 10 MG/ML
INJECTION, EMULSION INTRAVENOUS AS NEEDED
Status: DISCONTINUED | OUTPATIENT
Start: 2021-04-04 | End: 2021-04-04 | Stop reason: HOSPADM

## 2021-04-04 RX ORDER — SODIUM CHLORIDE, SODIUM LACTATE, POTASSIUM CHLORIDE, CALCIUM CHLORIDE 600; 310; 30; 20 MG/100ML; MG/100ML; MG/100ML; MG/100ML
100 INJECTION, SOLUTION INTRAVENOUS CONTINUOUS
Status: CANCELLED | OUTPATIENT
Start: 2021-04-04

## 2021-04-04 RX ORDER — DIVALPROEX SODIUM 125 MG/1
250 CAPSULE, COATED PELLETS ORAL
Status: DISCONTINUED | OUTPATIENT
Start: 2021-04-04 | End: 2021-04-08 | Stop reason: HOSPADM

## 2021-04-04 RX ADMIN — DIVALPROEX SODIUM 250 MG: 125 CAPSULE, COATED PELLETS ORAL at 22:45

## 2021-04-04 RX ADMIN — PHENYLEPHRINE HYDROCHLORIDE 40 MCG: 10 INJECTION INTRAVENOUS at 21:38

## 2021-04-04 RX ADMIN — Medication 10 ML: at 22:45

## 2021-04-04 RX ADMIN — LIDOCAINE HYDROCHLORIDE 40 MG: 20 INJECTION, SOLUTION EPIDURAL; INFILTRATION; INTRACAUDAL; PERINEURAL at 21:31

## 2021-04-04 RX ADMIN — Medication 10 ML: at 06:06

## 2021-04-04 RX ADMIN — PROPOFOL 20 MG: 10 INJECTION, EMULSION INTRAVENOUS at 21:34

## 2021-04-04 RX ADMIN — ENOXAPARIN SODIUM 40 MG: 40 INJECTION SUBCUTANEOUS at 08:57

## 2021-04-04 RX ADMIN — DIVALPROEX SODIUM 125 MG: 125 CAPSULE ORAL at 15:21

## 2021-04-04 RX ADMIN — PROPOFOL 100 MCG/KG/MIN: 10 INJECTION, EMULSION INTRAVENOUS at 21:37

## 2021-04-04 RX ADMIN — Medication 10 ML: at 15:21

## 2021-04-04 RX ADMIN — DIVALPROEX SODIUM 125 MG: 125 CAPSULE, COATED PELLETS ORAL at 06:00

## 2021-04-04 NOTE — PROGRESS NOTES
.  [facility] Progress Note             Date: 4/4/2021    GI Problem: PEG dislodged    History of Present Illness:  Patient is a 67 y.o. female who is seen in consultation for PEG dislodgement. PEG was initially placed 3/29 by Dr. Nader Velazquez for dysphagia and malnutrition related to her schizophrenia and dementia. I was called to see her tonight because she partially pulled out her PEG tube. The internal bumper has been pulled out of the stomach and is right under the skin. She has mittens on her hands.       Hospital Medications:  Current Facility-Administered Medications   Medication Dose Route Frequency    divalproex (DEPAKOTE SPRINKLE) capsule 125 mg  125 mg Oral BID    divalproex (DEPAKOTE SPRINKLE) capsule 250 mg  250 mg Oral QHS    deutetrabenazine tab 12 mg (Patient Supplied)  12 mg PEG Tube BID    fluPHENAZine decanoate (PROLIXIN) 25 mg/mL injection 50 mg (Patient Supplied)  50 mg IntraMUSCular EVERY 2 WEEKS    NUTRITIONAL SUPPORT ELECTROLYTE PRN ORDERS   Does Not Apply PRN    HYDROcodone-acetaminophen (NORCO) 5-325 mg per tablet 1 Tab  1 Tab Oral Q4H PRN    morphine injection 1 mg  1 mg IntraVENous Q4H PRN    sodium chloride (NS) flush 5-40 mL  5-40 mL IntraVENous Q8H    sodium chloride (NS) flush 5-40 mL  5-40 mL IntraVENous PRN    acetaminophen (TYLENOL) tablet 650 mg  650 mg Oral Q6H PRN    Or    acetaminophen (TYLENOL) suppository 650 mg  650 mg Rectal Q6H PRN    polyethylene glycol (MIRALAX) packet 17 g  17 g Oral DAILY PRN    promethazine (PHENERGAN) tablet 12.5 mg  12.5 mg Oral Q6H PRN    Or    ondansetron (ZOFRAN) injection 4 mg  4 mg IntraVENous Q6H PRN    enoxaparin (LOVENOX) injection 40 mg  40 mg SubCUTAneous DAILY    hydrALAZINE (APRESOLINE) 20 mg/mL injection 20 mg  20 mg IntraVENous Q6H PRN       Objective:     Physical Exam:  Vitals:  Visit Vitals  /86 (BP 1 Location: Left arm, BP Patient Position: At rest)   Pulse 74   Temp 98.4 °F (36.9 °C)   Resp 16 Ht 5' 3\" (1.6 m)   Wt 54.3 kg (119 lb 11.2 oz)   SpO2 98%   BMI 21.20 kg/m²       General: No acute distress. Skin:  Extremities and face reveal no rashes. No quesada erythema. No telangiectasias on the chest wall. HEENT: Sclerae anicteric. No oral ulcers. No abnormal pigmentation of the lips. The neck is supple. Cardiovascular: Regular rate and rhythm. No murmurs, gallops, or rubs. Respiratory:  Comfortable breathing  With no accessory muscle use. Clear breath sounds with no wheezes, rales, or rhonchi. GI:  Abdomen nondistended, soft, and nontender. Normal active bowel sounds. No enlargement of the liver or spleen. No masses palpable. Musculoskeletal:  No pitting edema of the lower legs. Extremities have good range of motion. Neurological:  Gross memory appears intact. Patient is alert and oriented. Psychiatric:  Mood appears appropriate with judgement intact. Lymphatic:  No cervical or supraclavicular adenopathy. Laboratory:    No results for input(s): WBC, RBC, HGB, HCT, PLT, HGBEXT, HCTEXT, PLTEXT in the last 72 hours. Recent Labs     04/04/21  1017   *      K 4.4      CO2 28   BUN 18   CREA 0.97   CA 9.2     No results for input(s): PTP, INR, APTT, INREXT in the last 72 hours. No results for input(s): TBIL, CBIL, AP, ALB, TP, AML, LPSE in the last 72 hours. No lab exists for component: SGOT, GPT    Assessment:       A 67 y.o. female with dislodged PEG with internal bumper pulled out of stomach and is just under the skin.       Plan:       Replace PEG by EGD tonight      Signed By: Ratna Harry MD     April 4, 2021

## 2021-04-04 NOTE — PROGRESS NOTES
Patient noted pulling peg tube. Peg tube site leaking.   MD notified via perfect serve  Tube feed placed on hold and site reinforced per MD

## 2021-04-04 NOTE — PROGRESS NOTES
Cross Coverage Note:      Notified that patient had pulled out her peg tube. Examined patient at bedside. Alert, awake but confused. In mittens. Denies any abdominal pain or discomfort. No abdominal tenderness on exam.  Scant blood in the dressing over the PEG tube but no obvious bleeding or drainage or oozing noted. GI contacted and they will come see the patient.

## 2021-04-04 NOTE — PROGRESS NOTES
Problem: Falls - Risk of  Goal: *Absence of Falls  Description: Document Caryn Broad Fall Risk and appropriate interventions in the flowsheet.   Outcome: Progressing Towards Goal  Note: Fall Risk Interventions:  Mobility Interventions: PT Consult for mobility concerns    Mentation Interventions: Door open when patient unattended    Medication Interventions: Evaluate medications/consider consulting pharmacy    Elimination Interventions: Call light in reach

## 2021-04-04 NOTE — PROGRESS NOTES
Progress Note    Patient: Kishor Mastesro MRN: [id number]  SSN: [id number]    YOB: 1945  Age: 67 y.o. Sex: female      Admit Date: 3/22/2021    LOS: 13 days     Subjective:   As per prior documentation:    \"75 y. o. female with medical h/o dementia, schizophrenia who presented to [facility] ER with report of several weeks progressive confusion and worsening agitation/confusion/ shuffling gait for past 3 days. Patient is minimally conversant so history obtained from son at bedside. She lives with her son with other family helping at times. They are overwhelmed by her needs and are concerned for her safety due to her wandering behaviors. \" They would like her to be placed into a nursing home. UTI found. Urine culture with mixed colonies. Speech therapy has not cleared for regular or thick liquids due to patient pocketing her food. Spoken to family who want PEG tube. NG tube feedings started 3/25. PEG tube placed 3/29. Tolerating tube feedings well. Has psych medications for outburst. Since obtaining her home medications, agitated much improved. Currently in mittens. Not speaking this AM. No acute distress. \"      April 1, 2021- patient seen and evaluated. No adverse overnight events reported by nursing. She is afebrile. Son at bedside all questions answered. He would like Prolixin given if possible. Pharmacy consulted. The patient herself is unable to give a history secondary to her baseline medical condition. April 2, 2021-patient seen and evaluated. No adverse overnight events reported by nursing. She remains afebrile. She did get her Prolixin yesterday after case management arranged for her to come from her psych facility. No nausea vomiting shortness of breath or fevers. Son again visiting at bedside. All questions answered. April 3 2021-patient seen and evaluated. No adverse overnight events reported by nursing staff.   This a.m. she is attempting to get out of bed and asking me to pull her up. Can be redirected. April 4, 2021-patient seen and evaluated. No adverse overnight events reported by nursing staff overall less agitated but still attempting to get up on her own. Current Facility-Administered Medications   Medication Dose Route Frequency    divalproex (DEPAKOTE SPRINKLE) capsule 125 mg  125 mg Oral Q8H    deutetrabenazine tab 12 mg (Patient Supplied)  12 mg PEG Tube BID    fluPHENAZine decanoate (PROLIXIN) 25 mg/mL injection 50 mg (Patient Supplied)  50 mg IntraMUSCular EVERY 2 WEEKS    NUTRITIONAL SUPPORT ELECTROLYTE PRN ORDERS   Does Not Apply PRN    HYDROcodone-acetaminophen (NORCO) 5-325 mg per tablet 1 Tab  1 Tab Oral Q4H PRN    morphine injection 1 mg  1 mg IntraVENous Q4H PRN    sodium chloride (NS) flush 5-40 mL  5-40 mL IntraVENous Q8H    sodium chloride (NS) flush 5-40 mL  5-40 mL IntraVENous PRN    acetaminophen (TYLENOL) tablet 650 mg  650 mg Oral Q6H PRN    Or    acetaminophen (TYLENOL) suppository 650 mg  650 mg Rectal Q6H PRN    polyethylene glycol (MIRALAX) packet 17 g  17 g Oral DAILY PRN    promethazine (PHENERGAN) tablet 12.5 mg  12.5 mg Oral Q6H PRN    Or    ondansetron (ZOFRAN) injection 4 mg  4 mg IntraVENous Q6H PRN    enoxaparin (LOVENOX) injection 40 mg  40 mg SubCUTAneous DAILY    hydrALAZINE (APRESOLINE) 20 mg/mL injection 20 mg  20 mg IntraVENous Q6H PRN       Objective:     Vitals:    04/03/21 1919 04/03/21 2326 04/04/21 0405 04/04/21 0700   BP: 137/84 120/76 131/82 93/66   Pulse: 91 86 75 73   Resp: 18 18 17 16   Temp: 97.9 °F (36.6 °C) 98.2 °F (36.8 °C) 98.5 °F (36.9 °C) 98.4 °F (36.9 °C)   SpO2: 95% 90% 96% 98%   Weight:       Height:             Intake and Output:  Current Shift: 04/04 0701 - 04/04 1900  In: 115   Out: -   Last three shifts: 04/02 1901 - 04/04 0700  In: 463 [P.O.:118]  Out: 0     Physical Exam:   General:  Alert, no distress, trying to get out of chair. Will answer a few questions for me.      Eyes: Conjunctivae/corneas clear. Ears:  Normal TMs and external ear canals both ears. Nose: Nares normal. Septum midline. Mouth/Throat: Poor dentition   Neck:  no JVD. Back:   deferred. Lungs:   Clear to auscultation bilaterally. Heart:  Regular rate and rhythm, S1, S2 normal   Abdomen:   Soft, non-tender. Bowel sounds normal. PEG tube in place   Extremities: Muscle wasting, limited ROM   Pulses: 2+ and symmetric all extremities. Skin: Skin color, texture, turgor normal. No rashes or lesions   Lymph nodes: Cervical, supraclavicular, and axillary nodes normal.   Neurologic:  No noted outbursts. Lab/Data Review:    Recent Results (from the past 24 hour(s))   METABOLIC PANEL, BASIC    Collection Time: 04/03/21  9:57 AM   Result Value Ref Range    Sodium 141 136 - 145 mmol/L    Potassium 4.2 3.5 - 5.1 mmol/L    Chloride 107 98 - 107 mmol/L    CO2 29 21 - 32 mmol/L    Anion gap 5 (L) 7 - 16 mmol/L    Glucose 93 65 - 100 mg/dL    BUN 16 8 - 23 MG/DL    Creatinine 0.97 0.6 - 1.0 MG/DL    GFR est AA >60 >60 ml/min/1.73m2    GFR est non-AA 59 (L) >60 ml/min/1.73m2    Calcium 9.6 8.3 - 10.4 MG/DL       Assessment/ Plan:   April 4th, 2021-this assessment and plan remains unchanged unless specifically delineated below. Principal Problem:    Acute metabolic encephalopathy (0/77/2995)    Active Problems:    Schizophrenia ([facility] [address] (3/24/2021)      Dementia ([facility] [address] (3/24/2021)      Severe protein-calorie malnutrition ([address] (3/25/2021)    Acute metabolic encephalopathy- Per son, seems to be near her baseline. She has been declining since December of last year. Family wanting nursing home. UTI- Ceftriaxone 3/23-3/28    Hypokalemia - Resolved. Schizophrenia -  austedo 12mg daily. She received a dose of Prolixin on April 1, 2021. This medication was due on March 31, 2021. Poor oral intake: S/p PEG tube 3/29. Tolerating tube feedings.  Speech therapy has not cleared for regular or thick liquids due to patient pocketing her food. Agitation-patient started on Depakote. Will increase to evening dosing to 250 mg. Case management is aware and is looking into nursing homes for placement. Disposition:  To be determined    DVT prophylaxis - Lovenox  Signed By: Fazal Bray MD     April 4, 2021

## 2021-04-04 NOTE — PROGRESS NOTES
Beside shift report received from [facility]  Patient lying in bed  Respirations present  No signs of distress  No needs expressed at this time  Safety measures in place

## 2021-04-05 LAB
ANION GAP SERPL CALC-SCNC: 8 MMOL/L (ref 7–16)
BUN SERPL-MCNC: 16 MG/DL (ref 8–23)
CALCIUM SERPL-MCNC: 9.2 MG/DL (ref 8.3–10.4)
CHLORIDE SERPL-SCNC: 105 MMOL/L (ref 98–107)
CO2 SERPL-SCNC: 27 MMOL/L (ref 21–32)
CREAT SERPL-MCNC: 1.01 MG/DL (ref 0.6–1)
GLUCOSE SERPL-MCNC: 90 MG/DL (ref 65–100)
MAGNESIUM SERPL-MCNC: 2.3 MG/DL (ref 1.8–2.4)
PHOSPHATE SERPL-MCNC: 4.1 MG/DL (ref 2.3–3.7)
POTASSIUM SERPL-SCNC: 4.2 MMOL/L (ref 3.5–5.1)
SODIUM SERPL-SCNC: 140 MMOL/L (ref 136–145)

## 2021-04-05 PROCEDURE — 74011250637 HC RX REV CODE- 250/637: Performed by: INTERNAL MEDICINE

## 2021-04-05 PROCEDURE — 36415 COLL VENOUS BLD VENIPUNCTURE: CPT

## 2021-04-05 PROCEDURE — 84100 ASSAY OF PHOSPHORUS: CPT

## 2021-04-05 PROCEDURE — 80048 BASIC METABOLIC PNL TOTAL CA: CPT

## 2021-04-05 PROCEDURE — 74011250636 HC RX REV CODE- 250/636: Performed by: INTERNAL MEDICINE

## 2021-04-05 PROCEDURE — 97530 THERAPEUTIC ACTIVITIES: CPT

## 2021-04-05 PROCEDURE — 97530 THERAPEUTIC ACTIVITIES: CPT | Performed by: PHYSICAL THERAPIST

## 2021-04-05 PROCEDURE — 97535 SELF CARE MNGMENT TRAINING: CPT

## 2021-04-05 PROCEDURE — 97164 PT RE-EVAL EST PLAN CARE: CPT | Performed by: PHYSICAL THERAPIST

## 2021-04-05 PROCEDURE — 83735 ASSAY OF MAGNESIUM: CPT

## 2021-04-05 PROCEDURE — 65270000029 HC RM PRIVATE

## 2021-04-05 RX ORDER — OLANZAPINE 5 MG/1
5 TABLET, ORALLY DISINTEGRATING ORAL
Status: DISCONTINUED | OUTPATIENT
Start: 2021-04-05 | End: 2021-04-08

## 2021-04-05 RX ORDER — DIVALPROEX SODIUM 125 MG/1
250 CAPSULE, COATED PELLETS ORAL 2 TIMES DAILY
Status: DISCONTINUED | OUTPATIENT
Start: 2021-04-05 | End: 2021-04-06

## 2021-04-05 RX ORDER — HALOPERIDOL 5 MG/ML
5 INJECTION INTRAMUSCULAR ONCE
Status: COMPLETED | OUTPATIENT
Start: 2021-04-05 | End: 2021-04-05

## 2021-04-05 RX ORDER — FACIAL-BODY WIPES
10 EACH TOPICAL ONCE
Status: COMPLETED | OUTPATIENT
Start: 2021-04-05 | End: 2021-04-05

## 2021-04-05 RX ADMIN — Medication 10 ML: at 06:00

## 2021-04-05 RX ADMIN — ENOXAPARIN SODIUM 40 MG: 40 INJECTION SUBCUTANEOUS at 10:01

## 2021-04-05 RX ADMIN — Medication 10 ML: at 22:00

## 2021-04-05 RX ADMIN — BISACODYL 10 MG: 10 SUPPOSITORY RECTAL at 16:10

## 2021-04-05 RX ADMIN — DIVALPROEX SODIUM 250 MG: 125 CAPSULE, COATED PELLETS ORAL at 22:05

## 2021-04-05 RX ADMIN — DIVALPROEX SODIUM 250 MG: 125 CAPSULE ORAL at 13:42

## 2021-04-05 RX ADMIN — HALOPERIDOL LACTATE 5 MG: 5 INJECTION, SOLUTION INTRAMUSCULAR at 16:10

## 2021-04-05 RX ADMIN — DIVALPROEX SODIUM 125 MG: 125 CAPSULE ORAL at 06:00

## 2021-04-05 RX ADMIN — OLANZAPINE 5 MG: 5 TABLET, ORALLY DISINTEGRATING ORAL at 22:05

## 2021-04-05 NOTE — ANESTHESIA POSTPROCEDURE EVALUATION
Procedure(s):  ESOPHAGOGASTRODUODENOSCOPY (EGD) / PEG TUBE PLACEMENT.    total IV anesthesia    Anesthesia Post Evaluation      Multimodal analgesia: multimodal analgesia not used between 6 hours prior to anesthesia start to PACU discharge  Patient location during evaluation: PACU  Patient participation: complete - patient cannot participate  Level of consciousness: confused (same as pre op)  Pain management: adequate  Airway patency: patent  Anesthetic complications: no  Cardiovascular status: hemodynamically stable  Respiratory status: acceptable and spontaneous ventilation  Hydration status: acceptable  Post anesthesia nausea and vomiting:  none  Final Post Anesthesia Temperature Assessment:  Normothermia (36.0-37.5 degrees C)      INITIAL Post-op Vital signs:   Vitals Value Taken Time   /85 04/04/21 2159   Temp 36.7 °C (98 °F) 04/04/21 2159   Pulse 85 04/04/21 2201   Resp 23 04/04/21 2201   SpO2 100 % 04/04/21 2201   Vitals shown include unvalidated device data.

## 2021-04-05 NOTE — PROCEDURES
Endoscopic Gastroduodenoscopy Procedure Note    Indications: Recently placed PEG dislodged; internal bumper pulled through stomach and was just underneath skin    Anesthesia/Sedation: MAC IV     Pre-Procedure Physical:    Current Facility-Administered Medications   Medication Dose Route Frequency    divalproex (DEPAKOTE SPRINKLE) capsule 125 mg  125 mg Oral BID    divalproex (DEPAKOTE SPRINKLE) capsule 250 mg  250 mg Oral QHS    lactated Ringers infusion  100 mL/hr IntraVENous CONTINUOUS    deutetrabenazine tab 12 mg (Patient Supplied)  12 mg PEG Tube BID    fluPHENAZine decanoate (PROLIXIN) 25 mg/mL injection 50 mg (Patient Supplied)  50 mg IntraMUSCular EVERY 2 WEEKS    NUTRITIONAL SUPPORT ELECTROLYTE PRN ORDERS   Does Not Apply PRN    HYDROcodone-acetaminophen (NORCO) 5-325 mg per tablet 1 Tab  1 Tab Oral Q4H PRN    morphine injection 1 mg  1 mg IntraVENous Q4H PRN    sodium chloride (NS) flush 5-40 mL  5-40 mL IntraVENous Q8H    sodium chloride (NS) flush 5-40 mL  5-40 mL IntraVENous PRN    acetaminophen (TYLENOL) tablet 650 mg  650 mg Oral Q6H PRN    Or    acetaminophen (TYLENOL) suppository 650 mg  650 mg Rectal Q6H PRN    polyethylene glycol (MIRALAX) packet 17 g  17 g Oral DAILY PRN    promethazine (PHENERGAN) tablet 12.5 mg  12.5 mg Oral Q6H PRN    Or    ondansetron (ZOFRAN) injection 4 mg  4 mg IntraVENous Q6H PRN    enoxaparin (LOVENOX) injection 40 mg  40 mg SubCUTAneous DAILY    hydrALAZINE (APRESOLINE) 20 mg/mL injection 20 mg  20 mg IntraVENous Q6H PRN     Facility-Administered Medications Ordered in Other Encounters   Medication Dose Route Frequency    lidocaine (PF) (XYLOCAINE) 20 mg/mL (2 %) injection   IntraVENous PRN    propofoL (DIPRIVAN) 10 mg/mL injection    PRN    propofoL (DIPRIVAN) 10 mg/mL injection    CONTINUOUS    PHENYLephrine (CYNDEE-SYNEPHRINE) 25,000 mcg in 0.9% sodium chloride 250 mL infusion   IntraVENous CONTINUOUS      Patient has no known allergies.     Patient Vitals for the past 8 hrs:   BP Temp Pulse Resp SpO2   04/04/21 2000 119/74 98.2 °F (36.8 °C) 88 16 98 %       Exam      Airway: clear   Heart: normal S1and S2    Lungs: clear bilateral  Abdomen: soft, nontender, bowel sounds present and normal in all quads   Mental Status: awake, alert and oriented to person, place and time          Procedure Details     Informed consent was obtained for the procedure, including conscious sedation. Risks of pancreatitis, infection, perforation, hemorrhage, adverse drug reaction and aspiration were discussed. The patient was placed in the left lateral decubitus position. Based on the pre-procedure assessment, including review of the patient's medical history, medications, allergies, and review of systems, she had been deemed to be an appropriate candidate for conscious sedation; she was therefore sedated with the medications listed below. She was monitored continuously with ECG tracing, pulse oximetry, blood pressure monitoring, and direct observation. The EGD gastroscope was inserted into the mouth and advanced under direct vision to the second portion of the duodenum. A careful inspection was made as the gastroscope was withdrawn, including a retroflexed view of the proximal stomach; findings and interventions are described below. Appropriate photodocumentation was obtained. This was not done with sterile technique as I was only  and incision was not made. Antibiotics not given as no new cut made. Under EGD visualization I tried inserting the wire through the prior PEG tract, but it would not enter the stomach. I used another needle to go as close as possible to the previously created gastric opening. New PEG placed. Bumper is at 3.5 cm. Findings:   Esophagus- Normal.  Stomach- Previous PEG whole in stomach seen. New 20 Fr PEG placed as close to old opening as possible.   Duodenum- Normal.    Therapies: New PEG placed    Specimens: None    Estimated Blood Loss: 0 cc           Complications:   None; patient tolerated the procedure well. Attending Attestation:  I performed the procedure. Impression:    Previously placed PEG dislodged, so removed. New 20 Fr PEG placed through same skin opening as closely as possible to previous gastric opening to tract. Recommendations:  Restart tube feeds tomorrow.

## 2021-04-05 NOTE — PROGRESS NOTES
TRANSFER - IN REPORT:    Verbal report received from Waqar Alcala on Ksenia Dean  being received from  for routine progression of care      Report consisted of patients Situation, Background, Assessment and   Recommendations(SBAR). Information from the following report(s) SBAR was reviewed with the receiving nurse. Opportunity for questions and clarification was provided. Assessment completed upon patients arrival to unit and care assumed.

## 2021-04-05 NOTE — PROGRESS NOTES
ACUTE OT GOALS:  (Developed with and agreed upon by patient and/or caregiver.)  1. Patient will bathe and dress total body with moderate assistance and adaptive device as needed. 2. Patient will toilet with maximal assistance and adaptive device as needed. 3. Patient will tolerate 30 minutes of OT treatment with up to 2 rest breaks to increase activity tolerance for ADLs. 4. Patient will complete functional mobility for ADLs with supervision and adaptive device as needed. 5. Patient will demonstrate improved cognition for ADLs by completing functional tasks with up to moderate cueing from therapist.     Timeframe: 7 visits  OCCUPATIONAL THERAPY: Daily Note OT Treatment Day # 6    Malathi Ragland is a 67 y.o. female   PRIMARY DIAGNOSIS: Acute metabolic encephalopathy  Acute metabolic encephalopathy [R80.50]  Reason for Referral:  Weakness  ICD-10: Treatment Diagnosis: Generalized Muscle Weakness (M62.81)  Other lack of cordination (R27.8)  1 Day Post-Op  [place] / [place] ONLY / Product Type: Medicare /   ASSESSMENT:     REHAB RECOMMENDATIONS: CURRENT LEVEL OF FUNCTION:  (Most Recently Demonstrated)   Recommendation to date pending progress:  Setting:   Short-term Rehab  Equipment:    To Be Determined Bathing:   Not tested  Dressing:   Moderate Assistance  Feeding/Grooming:   Moderate Assistance  Toileting:   Moderate Assistance   Functional Mobility:   Minimal Assistance     ASSESSMENT:  Ms. Cecilia Rios presents with metabolic encephalopathy. Hx dementia, schizophrenia, per chart has had recent decline in memory and speech, increased confusion and wandering. At baseline pt lives with children, completes ADLs with some assistance and ambulates independently. Pt with decreased strength, balance, endurance, and cognition impacting mobility and ADLs.  Pt cooperative but restless, practiced bed mobility with SBA-CGA, numerous functional transfers with CGA, ambulation with HHAx1-2/Danielle. Pt with decreased balance/steadiness this session. Antalgic gait noted with decreased time weight bearing through LLE. Pt unable to answer if she has pain in LLE. Practiced toileting with ModA to manage brief, grooming with set up to wash hands. Good progress towards goals. SUBJECTIVE:   Ms. Cecilia Rios states, \"Pull me up. \"    SOCIAL HISTORY/LIVING ENVIRONMENT: Per PT note, pt lives with children in a 2 level home with bedroom upstairs. Pt receives some assistance with ADLs, is independent with ambulation with no hx falls.  Pt's speech and memory have been drastically declining and pt will not have 24/7 supervision as son works   Home Environment: Private residence  [facility] Residence: Two story  Living Alone: No  New Pia: Child(segundo)    OBJECTIVE:     PAIN: VITAL SIGNS: LINES/DRAINS:   Pre Treatment: Pain Screen  Pain Scale 1: FLACC  Pain Intensity 1: 0  Post Treatment: 0   PEG  O2 Device: Room air     ACTIVITIES OF DAILY LIVING: I Mod I S SBA CGA Min Mod Max Total NT Comments   BASIC ADLs:              Bathing/ Showering [] [] [] [] [] [] [] [] [] [x]    Toileting [] [] [] [] [] [] [x] [] [] [] Assist to change brief   Dressing [] [] [] [] [] [x] [x] [] [] [] Change gown   Feeding [] [] [] [] [] [] [] [] [] [x]    Grooming [] [] [] [] [] [] [x] [] [] [] Brush teeth with total assist, wash hands with CGA after set up   Personal Device Care [] [] [] [] [] [] [] [] [] [x]    Functional Mobility [] [] [] [] [] [x] [] [] [] [] HHA   I=Independent, Mod I=Modified Independent, S=Supervision, SBA=Standby Assistance, CGA=Contact Guard Assistance,   Min=Minimal Assistance, Mod=Moderate Assistance, Max=Maximal Assistance, Total=Total Assistance, NT=Not Tested    MOBILITY: I Mod I S SBA CGA Min Mod Max Total  NT x2 Comments:   Supine to sit [] [] [] [x] [x] [] [] [] [] [] []    Sit to supine [] [] [] [x] [x] [] [] [] [] [] []    Sit to stand [] [] [] [] [x] [] [] [] [] [] []    Bed to chair [] [] [] [] [] [x] [] [] [] [] []    I=Independent, Mod I=Modified Independent, S=Supervision, SBA=Standby Assistance, CGA=Contact Guard Assistance,   Min=Minimal Assistance, Mod=Moderate Assistance, Max=Maximal Assistance, Total=Total Assistance, NT=Not Tested    BALANCE: Good Fair+ Fair Fair- Poor NT Comments   Sitting Static [] [x] [] [] [] []    Sitting Dynamic [] [x] [] [] [] []              Standing Static [] [] [x] [] [] []    Standing Dynamic [] [] [] [x] [] []      PLAN:   FREQUENCY/DURATION: OT Plan of Care: 3 times/week for duration of hospital stay or until stated goals are met, whichever comes first.    TREATMENT:   TREATMENT:   ($$ Self Care/Home Management: 23-37 mins$$ Therapeutic Activity: 23-37 mins   )  Therapeutic Activity (30 Minutes): Therapeutic activity included Rolling, Supine to Sit, Sit to Supine, Scooting, Transfer Training, Ambulation on level ground, Sitting balance  and Standing balance to improve functional Mobility, Strength, ROM and Activity tolerance. Self Care (24 Minutes): Self care including Toileting, Upper Body Dressing and Grooming to increase independence and decrease level of assistance required.     TREATMENT GRID:  N/A    AFTER TREATMENT POSITION/PRECAUTIONS:  Alarm Activated, Bed, Needs within reach and Restraints     INTERDISCIPLINARY COLLABORATION:  RN/PCT and OT/HUERTAS    TOTAL TREATMENT DURATION:  OT Patient Time In/Time Out  Time In: 1152  Time Out: Lynnette Merchant 112, OTR/L

## 2021-04-05 NOTE — PROGRESS NOTES
Report given to [facility], RN. Patient resting quietly in bed. Respirations even and unlabored. On room air. PEG tube clamped. No signs of distress. No needs expressed.

## 2021-04-05 NOTE — PROGRESS NOTES
Patient's son Javier Medrano at bedside. Obtained consent for EGD with PEG replacement. Consent signed by Javier Medrano and placed in patient's chart.

## 2021-04-05 NOTE — PROGRESS NOTES
Spoke with patient's son, Mary Graf ([phone]), and obtained telephone consent with second RN, Simone Charles, for EGD with PEG replacement. Yenni Liu denies questions at this time. Consent placed in patient's paper chart.

## 2021-04-05 NOTE — ANESTHESIA PREPROCEDURE EVALUATION
Relevant Problems   NEUROLOGY   (+) Dementia (HCC)   (+) Schizophrenia (HCC)       Anesthetic History   No history of anesthetic complications            Review of Systems / Medical History  Patient summary reviewed and pertinent labs reviewed    Pulmonary  Within defined limits                 Neuro/Psych         Psychiatric history and dementia    Comments: schizophrenia Cardiovascular                  Exercise tolerance: <4 METS     GI/Hepatic/Renal               Comments: Pulled out her peg  Endo/Other  Within defined limits           Other Findings   Comments: Failure to thrive since Roberta  malnutrition           Physical Exam    Airway             Cardiovascular    Rhythm: regular  Rate: normal         Dental         Pulmonary                 Abdominal  GI exam deferred       Other Findings            Anesthetic Plan    ASA: 3, emergent  Anesthesia type: total IV anesthesia          Induction: Intravenous  Anesthetic plan and risks discussed with: Patient and Son / Daughter

## 2021-04-05 NOTE — PROGRESS NOTES
Patient will likely be stable for discharge tomorrow. Patient will discharge to [facility] when stable.

## 2021-04-05 NOTE — PROGRESS NOTES
Progress Note    Patient: Juan Manuel Wharton MRN: [id number]  SSN: [id number]    YOB: 1945  Age: 67 y.o. Sex: female      Admit Date: 3/22/2021    LOS: 14 days     Subjective:   As per prior documentation:    \"75 y. o. female with medical h/o dementia, schizophrenia who presented to [facility] ER with report of several weeks progressive confusion and worsening agitation/confusion/ shuffling gait for past 3 days. Patient is minimally conversant so history obtained from son at bedside. She lives with her son with other family helping at times. They are overwhelmed by her needs and are concerned for her safety due to her wandering behaviors. \" They would like her to be placed into a nursing home. UTI found. Urine culture with mixed colonies. Speech therapy has not cleared for regular or thick liquids due to patient pocketing her food. Spoken to family who want PEG tube. NG tube feedings started 3/25. PEG tube placed 3/29. Tolerating tube feedings well. Has psych medications for outburst. Since obtaining her home medications, agitated much improved. Currently in mittens. Not speaking this AM. No acute distress. \"      April 1, 2021- patient seen and evaluated. No adverse overnight events reported by nursing. She is afebrile. Son at bedside all questions answered. He would like Prolixin given if possible. Pharmacy consulted. The patient herself is unable to give a history secondary to her baseline medical condition. April 2, 2021-patient seen and evaluated. No adverse overnight events reported by nursing. She remains afebrile. She did get her Prolixin yesterday after case management arranged for her to come from her psych facility. No nausea vomiting shortness of breath or fevers. Son again visiting at bedside. All questions answered. April 3 2021-patient seen and evaluated. No adverse overnight events reported by nursing staff.   This a.m. she is attempting to get out of bed and asking me to pull her up. Can be redirected. April 4, 2021-patient seen and evaluated. No adverse overnight events reported by nursing staff overall less agitated but still attempting to get up on her own. April 5, 2021-patient seen and evaluated. She apparently pulled out her PEG tube last night and had to be replaced by GI. Calmer today and less agitated.   Unable to give a history secondary to her current medical condition    Current Facility-Administered Medications   Medication Dose Route Frequency    divalproex (DEPAKOTE SPRINKLE) capsule 250 mg  250 mg Oral BID    OLANZapine (ZyPREXA zydis) disintegrating tablet 5 mg  5 mg Oral QHS    haloperidol lactate (HALDOL) injection 5 mg  5 mg IntraVENous ONCE    divalproex (DEPAKOTE SPRINKLE) capsule 250 mg  250 mg Oral QHS    deutetrabenazine tab 12 mg (Patient Supplied)  12 mg PEG Tube BID    fluPHENAZine decanoate (PROLIXIN) 25 mg/mL injection 50 mg (Patient Supplied)  50 mg IntraMUSCular EVERY 2 WEEKS    NUTRITIONAL SUPPORT ELECTROLYTE PRN ORDERS   Does Not Apply PRN    HYDROcodone-acetaminophen (NORCO) 5-325 mg per tablet 1 Tab  1 Tab Oral Q4H PRN    morphine injection 1 mg  1 mg IntraVENous Q4H PRN    sodium chloride (NS) flush 5-40 mL  5-40 mL IntraVENous Q8H    sodium chloride (NS) flush 5-40 mL  5-40 mL IntraVENous PRN    acetaminophen (TYLENOL) tablet 650 mg  650 mg Oral Q6H PRN    Or    acetaminophen (TYLENOL) suppository 650 mg  650 mg Rectal Q6H PRN    polyethylene glycol (MIRALAX) packet 17 g  17 g Oral DAILY PRN    promethazine (PHENERGAN) tablet 12.5 mg  12.5 mg Oral Q6H PRN    Or    ondansetron (ZOFRAN) injection 4 mg  4 mg IntraVENous Q6H PRN    enoxaparin (LOVENOX) injection 40 mg  40 mg SubCUTAneous DAILY    hydrALAZINE (APRESOLINE) 20 mg/mL injection 20 mg  20 mg IntraVENous Q6H PRN       Objective:     Vitals:    04/05/21 0400 04/05/21 0430 04/05/21 0748 04/05/21 1128   BP:  (!) 153/81 125/79 130/67   Pulse:  86 83 97 Resp:  16 18 19   Temp:  98.8 °F (37.1 °C) 99 °F (37.2 °C) 98.5 °F (36.9 °C)   SpO2:  98% 95% 98%   Weight: 57.2 kg (126 lb 1.6 oz)      Height:             Intake and Output:  Current Shift: No intake/output data recorded. Last three shifts: 04/03 1901 - 04/05 0700  In: 205   Out: -     Physical Exam:   General:  Alert, no distress, trying to get out of chair. Will answer no questions for me. Eyes:  Conjunctivae/corneas clear. Ears:  Normal TMs and external ear canals both ears. Nose: Nares normal. Septum midline. Mouth/Throat: Poor dentition   Neck:  no JVD. Back:   deferred. Lungs:   Clear to auscultation bilaterally. Heart:  Regular rate and rhythm, S1, S2 normal   Abdomen:   Soft, non-tender. Bowel sounds normal. PEG tube in place   Extremities: Muscle wasting, limited ROM   Pulses: 2+ and symmetric all extremities. Skin: Skin color, texture, turgor normal. No rashes or lesions   Lymph nodes: Cervical, supraclavicular, and axillary nodes normal.   Neurologic:  No noted outbursts. Lab/Data Review:    Recent Results (from the past 24 hour(s))   PHOSPHORUS    Collection Time: 04/05/21  6:19 AM   Result Value Ref Range    Phosphorus 4.1 (H) 2.3 - 3.7 MG/DL   MAGNESIUM    Collection Time: 04/05/21  6:19 AM   Result Value Ref Range    Magnesium 2.3 1.8 - 2.4 mg/dL   METABOLIC PANEL, BASIC    Collection Time: 04/05/21  6:19 AM   Result Value Ref Range    Sodium 140 136 - 145 mmol/L    Potassium 4.2 3.5 - 5.1 mmol/L    Chloride 105 98 - 107 mmol/L    CO2 27 21 - 32 mmol/L    Anion gap 8 7 - 16 mmol/L    Glucose 90 65 - 100 mg/dL    BUN 16 8 - 23 MG/DL    Creatinine 1.01 (H) 0.6 - 1.0 MG/DL    GFR est AA >60 >60 ml/min/1.73m2    GFR est non-AA 57 (L) >60 ml/min/1.73m2    Calcium 9.2 8.3 - 10.4 MG/DL       Assessment/ Plan:   April 5th, 2021-this assessment and plan remains unchanged unless specifically delineated below.   Principal Problem:    Acute metabolic encephalopathy (3/22/2021)    Active Problems:    Schizophrenia ([facility] (3/24/2021)      Dementia ([facility] (3/24/2021)      Severe protein-calorie malnutrition ([facility] (3/25/2021)    Acute metabolic encephalopathy- Per son, seems to be near her baseline. She has been declining since December of last year. Family wanting nursing home. UTI- Ceftriaxone 3/23-3/28    Hypokalemia - Resolved. Schizophrenia -  austedo 12mg daily. She received a dose of Prolixin on April 1, 2021. This medication was due on March 31, 2021. Poor oral intake: S/p PEG tube 3/29. Tolerating tube feedings. Speech therapy has not cleared for regular or thick liquids due to patient pocketing her food. She pulled out the PEG tube last night and is being replaced by GI. Agitation-patient started on Depakote. Will increase to evening dosing to 250 mg every 8 hours. We will also add Zyprexa at night. Case management is aware and is looking into nursing homes for placement. Disposition:  To be determined    DVT prophylaxis - Lovenox  Signed By: Fredis Knight MD     April 5, 2021

## 2021-04-05 NOTE — PROGRESS NOTES
Received report from Zahraa Medrano, [facility]. Patient awake and in bed. Oriented to self only. Respirations even and unlabored. On room air. No signs of distress. No needs expressed. Bed low and locked. Call light within reach. Will continue to monitor.

## 2021-04-05 NOTE — PROGRESS NOTES
VSS. Confused for the shift. Attempted to exit the bed several times. Peg tube remains in place with abd binder in place. Tube feeding restarted. Pt tolerating well. Large BM noted this evening. Abd soft with active BS. Tolerating meds well. Pt currently in bed. Bed alarm activated. Bed low and locked with call bell within reach.

## 2021-04-05 NOTE — PROGRESS NOTES
Comprehensive Nutrition Assessment    Type and Reason for Visit: Reassess  Tube Feeding Management (Hospitalist)    Nutrition Recommendations/Plan:   · Enteral Nutrition:  · Continue Jevity 1.5 via PEG at 25ml/hr and progress 20ml/hr Q4H to goal rate of 45ml/hour. · Water flush 115ml Q4H.    · Provides 1485 kcal (100% estimated calorie needs), 63 grams protein (100% estimated protein needs) RCN 0715TC free fluid (1ml/kcal) calculations based on 22 hours infusion. · Vitamin and Mineral Supplement Therapy:  · Electrolyte management replacement protocol active.   · Labs:  · BMP daily, Mg and Phos MWF. · Bowel Regimen:  · Add one time dose dulcolax suppository     Malnutrition Assessment:  Malnutrition Status: Severe malnutrition  Context: Chronic illness  Findings of clinical characteristics of malnutrition:   Energy Intake:  Mild decrease in energy intake (specify)(poor po intake reported per son)  Weight Loss:  7.0 - Greater than 7.5% over 3 months(133lbs at [address] [facility] [place] visit; 13% in 2 months)     Body Fat Loss:  7 - Severe body fat loss, Triceps, Orbital   Muscle Mass Loss:  1 - Mild muscle mass loss, Calf (gastrocnemius), Temples (temporalis), Thigh (quadraceps), Clavicles (pectoralis &deltoids)  Fluid Accumulation:  Unable to assess,     Strength:  Not performed     Nutrition Assessment:   Nutrition History: Unable to assess. Per SLP note on 3/24, son reports pt has had poor po intake since 3/18. Pt is taking a bite or 2 of foods such as mashed potatoes. Noted ED visit at [facility] on 1/2/21 due to cognitive decline. Per ED note, pt's son Mami Mccarty), stated pt was fine until December 26th and pt was unable to walk well, not eating, and unable to carry on conversations with family members. Noted pt has had a significant wt loss since office visit at [address] [facility] [place] on 1/28/21. Nutrition Background: Pt presents due to several weeks of progressive confusion and worsening agitation.  PMH notable for dementia and schizophrenia. Daily Update:  Observed pt in soft mitt restraints and lap belt today. Pt pulled PEG tube 4/4 with internal bumper pulled out of the stomach and right under the skin per GI note. New 20 Fr PEG tube replaced 4/4. Per GI note 4/5, continue with PEG feeds today. Discussed with RN to start at 25ml/hr as ordered previously. No noted BM since 4/1. RN today and RN from 4/4 deny pt having BM. Abdominal Status (last documented): Other (comment)(PEG tube) abdomen with Active  bowel sounds. Last BM 04/01/21. Pertinent Medications: PRN Miralax   Pertinent Labs:     Lab Results   Component Value Date/Time     04/05/2021 06:19 AM    K 4.2 04/05/2021 06:19 AM     04/05/2021 06:19 AM    CO2 27 04/05/2021 06:19 AM    AGAP 8 04/05/2021 06:19 AM    GLU 90 04/05/2021 06:19 AM    BUN 16 04/05/2021 06:19 AM    CREA 1.01 (H) 04/05/2021 06:19 AM    GFRAA >60 04/05/2021 06:19 AM    GFRNA 57 (L) 04/05/2021 06:19 AM    CA 9.2 04/05/2021 06:19 AM    MG 2.3 04/05/2021 06:19 AM    PHOS 4.1 (H) 04/05/2021 06:19 AM   Phos elevated today with last BM noted on 4/1. Nutrition Related Findings:   Bridled NGT placed 3/25; TF started 3/26; 20FR PEG placed 3/20, TF started 3/30 and tolerated at goal.; PEG pulled 4/4 with new PEG placed 4/4      Current Nutrition Therapies:  DIET NUTRITIONAL SUPPLEMENTS All Meals; Ensure Clear  DIET CLEAR LIQUID  DIET TUBE FEEDING Please open for rate and flush. Start once GI approves for PEG to be used for TF.   Current Tube Feeding (TF) Orders:   · Feeding Route: PEG  · Formula: Jevity 1.5  · Schedule:Continuous    · Regimen: 25ml/hr; 20ml Q4H to 45ml/hr  · Additives/Modulars: (none ordered)  · Water Flushes: 115ml Q4H  · Current TF & Flush Orders Provides: at 25ml/hr meeting 83% estimated nutrition needs  · Goal TF & Flush Orders Provides: 1485 kcal (100% estimated calorie needs), 63 grams protein (100% estimated protein needs) WTX 3255ZC free fluid (1ml/kcal) calculations based on 22 hours infusion. Current Intake:   Average Meal Intake: 0% Average Supplement Intake: 0%      Anthropometric Measures:  Height: 5' 3\" (160 cm)  Current Body Wt: 57.2 kg (126 lb 1.7 oz)(4/5), Weight source: Not specified  BMI: 22.3, Normal weight (BMI 22.0-24.9) age over 72  Admission Body Weight: 115 lb 8.3 oz(standing scale; 3/24)  Ideal Body Weight (lbs) (Calculated): 115 lbs (52 kg), 96.3 %  Usual Body Wt: 60.4 kg (133 lb 2.5 oz)(1/28/21;  [facility]), Percent weight change: -13.2          Edema: No data recorded   Estimated Daily Nutrient Needs:  Energy (kcal/day): 5146-6556 (Kcal/kg(25-30), Weight Used: Current(52.4kg))  Protein (g/day): 52-63(1-1.2gm/kg) Weight Used: (Current(52.4kg))  Fluid (ml/day):   (1 ml/kcal)    Nutrition Diagnosis:   · Severe malnutrition, In context of chronic illness related to cognitive or neurological impairment, inadequate protein-energy intake as evidenced by (criteria provided in malnutrition assessment above)    · Inadequate oral intake related to cognitive or neurological impairment, swallowing difficulty as evidenced by (PEG for primary nutrition)    Nutrition Interventions:   Food and/or Nutrient Delivery: Continue current diet, Continue oral nutrition supplement, Continue tube feeding     Coordination of Nutrition Care: Continue to monitor while inpatient  Plan of Care discussed with BONNIE Basilio    Goals:   Previous Goal Met: Progressing toward goal(s)  Active Goal: Maintain estimated nutrition needs via EN during admission    Nutrition Monitoring and Evaluation:      Food/Nutrient Intake Outcomes: Food and nutrient intake, Supplement intake, Enteral nutrition intake/tolerance       Discharge Planning:    Enteral nutrition    Electronically signed by Joe Carlton MS, PAULAN, LD 4/5/2021 at 2:52 PM  Contact: [phone]

## 2021-04-05 NOTE — PROGRESS NOTES
TRANSFER - OUT REPORT:    Verbal report given to BONNIE Steel on Cm Areas being transferred to  for routine post - op       Report consisted of patients Situation, Background, Assessment and Recommendations (SBAR). Information from the following report(s) Procedure Summary was reviewed with the receiving nurse. Opportunity for questions and clarification was provided.

## 2021-04-05 NOTE — PROGRESS NOTES
ACUTE PHYSICAL THERAPY GOALS:  (Developed with and agreed upon by patient and/or caregiver. )  LTG:  (1.)Ms. Little Tavarez will move from supine to sit and sit to supine, scoot up and down and roll side to side in bed INDEPENDENTLY with bed flat within 7 treatment day(s). VERY GOOD PROGRESS 4/5  (2.)Ms. Little Tavarez will transfer from bed to chair and chair to bed with SUPERVISION using the least restrictive device within 7 treatment day(s). GOOD PROGRESS 4/5  (3.)Ms. Little Tavarez will ambulate with SUPERVISION for 150 feet with the least restrictive device within 7 treatment day(s). UNTIL TODAY, GOOD PROGRESS, BUT RECENT DECLINE IN THIS GOAL, SEE NOTE  (4.)Ms. Little Tavarez will ascend and descend 10 steps with MINIMAL ASSIST using handrail(s) and/or handheld assist within 7 days.  SLOW PROGRESS 4/5  ________________________________________________________________________________________________    PHYSICAL THERAPY ASSESSMENT: Daily Note, Re-evaluation and AM PT Treatment Day # 1      Cordelia Zaldivar is a 67 y.o. female   PRIMARY DIAGNOSIS: Acute metabolic encephalopathy  Acute metabolic encephalopathy [K84.41]  Procedure(s) (LRB):  ESOPHAGOGASTRODUODENOSCOPY (EGD) / PEG TUBE PLACEMENT (N/A)  1 Day Post-Op  Reason for Referral:  Encephalopathy, weakness, AMS  ICD-10: Treatment Diagnosis: Generalized Muscle Weakness (M62.81)  Difficulty in walking, Not elsewhere classified (R26.2)  Other abnormalities of gait and mobility (R26.89)  INPATIENT: [place] / Plan[place] ONLY / Product Type: Medicare /     ASSESSMENT:     REHAB RECOMMENDATIONS:   Recommendation to date pending progress:  Setting:   Short-term Rehab  Equipment:    To Be Determined     PRIOR LEVEL OF FUNCTION:  (Prior to Hospitalization) INITIAL/CURRENT LEVEL OF FUNCTION:  (Most Recently Demonstrated)   Bed Mobility:   Standby Assistance  Sit to Stand:   Minimal Assistance  Transfers:   Minimal Assistance x 2  Gait/Mobility:   Minimal Assistance x 2 Bed Mobility:   Standby Assistance  Sit to Stand:   Contact Guard Assistance  Transfers:   Contact Guard Assistance  Gait/Mobility:   Minimal Assistance x 2     ASSESSMENT:  Ms. Larisa Gordon   Upon entering, Pnt is agreeable to PT treatment. she is non-verbal, but denies pain at rest. Pnt performed supine > sit with SBA, sitting EOB with fair sitting balance control. Sit > stand with CGA using HHA. Gait 2x 50 ft with min Ax2, cues for widening CHATA and increasing step length. Gait is noted to be very slow compared to last session, and pnt is lethargic and requires frequent cueing to continue ambulation. Stand > sit with CGA, followed by positioning for comfort. At end of session pt supine in bed with all needs within reach, alarm activated for safety, mitts re-applied, RN notified. Overall, good progress today in bed mobility, but pnt displays decreased OOB/ambulation tolerance. Pnt continues to present as functioning below her baseline, with deficits in mobility including transfers, gait, balance, and activity tolerance. Pt will continue to benefit from skilled therapy services to address stated deficits to promote return to highest level of function, independence, and safety. Will continue to follow. SUBJECTIVE:   Ms. Larisa Gordon is non verbal throughout session    SOCIAL HISTORY/LIVING ENVIRONMENT: Per son: pt lives with him and another son and daughter in two story home. His siblings are both moving out soon and he works. Patients bedroom is upstairs; she has been unable to navigate steps for the past 3-4 months so has bee staying upstairs. Needs some assistance with ADLs. Denies DME use or falls prior to admission. States patients speech and memory have been drastically declining.    Home Environment: Private residence  One/Two Story Residence: Two story  Living Alone: No  Support Systems: Child(segundo)  OBJECTIVE:     PAIN: VITAL SIGNS: LINES/DRAINS:   Pre Treatment: Pain Screen  Pain Scale 1: FLACC  Pain Intensity 1: 0  Post Treatment: 0/10 FLACC   none  O2 Device: Room air     GROSS EVALUATION:   Within Functional Limits Abnormal/ Functional Abnormal/ Non-Functional (see comments) Not Tested Comments:   AROM [x] [x] [] [] Decreased bilateral dorsiflexion and hip extension   PROM [] [] [] []    Strength [] [x] [] []    Balance [] [x] [] []    Posture [] [x] [] []    Sensation [] [] [] []    Coordination [] [] [] []    Tone [] [] [] []    Edema [] [] [] []    Activity Tolerance [] [x] [] [] pnt presents w/ worsened activity tolerance today    [] [] [] []      COGNITION/  PERCEPTION: Intact Impaired   (see comments) Comments:   Orientation [] [] Unable to verbalize   Vision [] []    Hearing [] []    Command Following [] [x] One step verbal commands   Safety Awareness [] [x]     [] []      MOBILITY: I Mod I S SBA CGA Min Mod Max Total  NT x2 Comments:   Bed Mobility    Rolling [] [] [] [x] [] [] [] [] [] [] []    Supine to Sit [] [] [] [x] [] [] [] [] [] [] []    Scooting [] [] [] [x] [] [] [] [] [] [] []    Sit to Supine [] [] [] [x] [] [] [] [] [] [] []    Transfers    Sit to Stand [] [] [] [] [x] [] [] [] [] [] []    Bed to Chair [] [] [] [] [] [] [] [] [] [] []    Stand to Sit [] [] [] [] [x] [] [] [] [] [] []    I=Independent, Mod I=Modified Independent, S=Supervision, SBA=Standby Assistance, CGA=Contact Guard Assistance,   Min=Minimal Assistance, Mod=Moderate Assistance, Max=Maximal Assistance, Total=Total Assistance, NT=Not Tested  GAIT: I Mod I S SBA CGA Min Mod Max Total  NT x2 Comments:   Level of Assistance [] [] [] [] [] [x] [] [] [] [] [x]    Distance 2 x 50'    DME min HHA x2 w/ chair follow    Gait Quality Impaired balance, gait safety    Weightbearing Status N/A     I=Independent, Mod I=Modified Independent, S=Supervision, SBA=Standby Assistance, CGA=Contact Guard Assistance,   Min=Minimal Assistance, Mod=Moderate Assistance, Max=Maximal Assistance, Total=Total Assistance, NT=Not [facility]   Basic Mobility Inpatient Short Form       How much difficulty does the patient currently have. .. Unable A Lot A Little None   1. Turning over in bed (including adjusting bedclothes, sheets and blankets)? [] 1   [] 2   [] 3   [x] 4   2. Sitting down on and standing up from a chair with arms ( e.g., wheelchair, bedside commode, etc.)   [] 1   [] 2   [x] 3   [] 4   3. Moving from lying on back to sitting on the side of the bed? [] 1   [] 2   [x] 3   [] 4   How much help from another person does the patient currently need. .. Total A Lot A Little None   4. Moving to and from a bed to a chair (including a wheelchair)? [] 1   [] 2   [x] 3   [] 4   5. Need to walk in hospital room? [] 1   [] 2   [x] 3   [] 4   6. Climbing 3-5 steps with a railing? [x] 1   [] 2   [] 3   [] 4   © 2007, Trustees of [address] [place] [address], under license to [employer]. All rights reserved     Score:  Initial: 16 Most Recent: 17 (Date: 4/5 )    Interpretation of Tool:  Represents activities that are increasingly more difficult (i.e. Bed mobility, Transfers, Gait). PLAN:   FREQUENCY/DURATION: PT Plan of Care: 3 times/week for duration of hospital stay or until stated goals are met, whichever comes first.    PROBLEM LIST:   (Skilled intervention is medically necessary to address:)  1. Decreased Activity Tolerance  2. Decreased Balance  3. Decreased Cognition  4. Decreased Coordination  5. Decreased Gait Ability  6. Decreased Strength  7. Decreased Transfer Abilities   INTERVENTIONS PLANNED:   (Benefits and precautions of physical therapy have been discussed with the patient.)  1. Therapeutic Activity  2. Therapeutic Exercise/HEP  3. Neuromuscular Re-education  4. Gait Training  5. Manual Therapy  6. Education     TREATMENT:     RE-EVALUATION:(Untimed Charge)    TREATMENT:   ($$ Therapeutic Activity: 8-22 mins    )  Therapeutic Activity (9 Minutes):  Therapeutic activity included Sit to Supine, Scooting, Transfer Training, Ambulation on level ground, Sitting balance  and Standing balance to improve functional Mobility, Strength and Activity tolerance.     TREATMENT GRID:  N/A    AFTER TREATMENT POSITION/PRECAUTIONS:  Bed, Needs within reach, RN notified and lissettes    INTERDISCIPLINARY COLLABORATION:  RN/PCT and PT/PTA    TOTAL TREATMENT DURATION:  PT Patient Time In/Time Out  Time In: [facility]  Time Out: [facility],[address]

## 2021-04-05 NOTE — PROGRESS NOTES
.  [facility] Progress Note             Date: 4/5/2021    GI Problem: PEG dislodged    History of Present Illness:   Pt laying in bed with abdominal binder and mittens. She appears to be pleasantly confused. No complaints. Hospital Medications:  Current Facility-Administered Medications   Medication Dose Route Frequency    divalproex (DEPAKOTE SPRINKLE) capsule 125 mg  125 mg Oral BID    divalproex (DEPAKOTE SPRINKLE) capsule 250 mg  250 mg Oral QHS    deutetrabenazine tab 12 mg (Patient Supplied)  12 mg PEG Tube BID    fluPHENAZine decanoate (PROLIXIN) 25 mg/mL injection 50 mg (Patient Supplied)  50 mg IntraMUSCular EVERY 2 WEEKS    NUTRITIONAL SUPPORT ELECTROLYTE PRN ORDERS   Does Not Apply PRN    HYDROcodone-acetaminophen (NORCO) 5-325 mg per tablet 1 Tab  1 Tab Oral Q4H PRN    morphine injection 1 mg  1 mg IntraVENous Q4H PRN    sodium chloride (NS) flush 5-40 mL  5-40 mL IntraVENous Q8H    sodium chloride (NS) flush 5-40 mL  5-40 mL IntraVENous PRN    acetaminophen (TYLENOL) tablet 650 mg  650 mg Oral Q6H PRN    Or    acetaminophen (TYLENOL) suppository 650 mg  650 mg Rectal Q6H PRN    polyethylene glycol (MIRALAX) packet 17 g  17 g Oral DAILY PRN    promethazine (PHENERGAN) tablet 12.5 mg  12.5 mg Oral Q6H PRN    Or    ondansetron (ZOFRAN) injection 4 mg  4 mg IntraVENous Q6H PRN    enoxaparin (LOVENOX) injection 40 mg  40 mg SubCUTAneous DAILY    hydrALAZINE (APRESOLINE) 20 mg/mL injection 20 mg  20 mg IntraVENous Q6H PRN       Objective:     Physical Exam:  Vitals:  Visit Vitals  /79 (BP 1 Location: Right arm, BP Patient Position: At rest)   Pulse 83   Temp 99 °F (37.2 °C)   Resp 18   Ht 5' 3\" (1.6 m)   Wt 57.2 kg (126 lb 1.6 oz)   SpO2 95%   BMI 22.34 kg/m²       General: No acute distress. Skin:  Extremities and face reveal no rashes  HEENT: Sclerae anicteric. GI:  Abdomen nondistended, soft, and nontender. Normal active bowel sounds.  PEG in place/intact. Laboratory:    No results for input(s): WBC, RBC, HGB, HCT, PLT, HGBEXT, HCTEXT, PLTEXT, HGBEXT, HCTEXT, PLTEXT in the last 72 hours. Recent Labs     04/05/21  0619   GLU 90      K 4.2      CO2 27   BUN 16   CREA 1.01*   CA 9.2     No results for input(s): PTP, INR, APTT, INREXT, INREXT in the last 72 hours. No results for input(s): TBIL, CBIL, AP, ALB, TP, AML, LPSE in the last 72 hours. No lab exists for component: SGOT, GPT      Assessment:       A 67 y.o. female seen in GI consult by Dr. Mariya López 4/4 for dislodged PEG. PEG was initially placed 3/29 by Dr. Kye Ansari for dysphagia and malnutrition related to her schizophrenia and dementia. Last night Dr. Mariya López was called with report that she partially pulled out her PEG tube. The internal bumper had reportedly been pulled out of the stomach and was right under the skin. PEG was replaced last night with new 20 Fr PEG placed through same skin opening as closely as possible to previous gastric opening to tract. Currently PEG remains in place and in tact. Pt in abdominal binder and hand mittens. No complications per RN. Plan:       -Continue with PEG feeds with nutrition recommendations  No further GI recommendations at this time.      Norma Boo PA-C  [facility]    Patient seen and examined. PEG has now been replaced; an abdominal wall binder and mittens necessary to help prevent another displacement of the tube. Will sign off--please jacky if needed.   Thanks Kai Bose MD

## 2021-04-06 LAB
ANION GAP SERPL CALC-SCNC: 6 MMOL/L (ref 7–16)
BUN SERPL-MCNC: 16 MG/DL (ref 8–23)
CALCIUM SERPL-MCNC: 9.4 MG/DL (ref 8.3–10.4)
CHLORIDE SERPL-SCNC: 107 MMOL/L (ref 98–107)
CO2 SERPL-SCNC: 28 MMOL/L (ref 21–32)
CREAT SERPL-MCNC: 1.05 MG/DL (ref 0.6–1)
GLUCOSE SERPL-MCNC: 109 MG/DL (ref 65–100)
POTASSIUM SERPL-SCNC: 3.9 MMOL/L (ref 3.5–5.1)
SODIUM SERPL-SCNC: 141 MMOL/L (ref 136–145)

## 2021-04-06 PROCEDURE — 36415 COLL VENOUS BLD VENIPUNCTURE: CPT

## 2021-04-06 PROCEDURE — 2709999900 HC NON-CHARGEABLE SUPPLY

## 2021-04-06 PROCEDURE — 65270000029 HC RM PRIVATE

## 2021-04-06 PROCEDURE — 74011250637 HC RX REV CODE- 250/637: Performed by: INTERNAL MEDICINE

## 2021-04-06 PROCEDURE — 80048 BASIC METABOLIC PNL TOTAL CA: CPT

## 2021-04-06 PROCEDURE — 74011250636 HC RX REV CODE- 250/636: Performed by: INTERNAL MEDICINE

## 2021-04-06 RX ORDER — DIVALPROEX SODIUM 125 MG/1
125 CAPSULE, COATED PELLETS ORAL 2 TIMES DAILY
Status: DISCONTINUED | OUTPATIENT
Start: 2021-04-06 | End: 2021-04-07

## 2021-04-06 RX ADMIN — Medication 10 ML: at 06:00

## 2021-04-06 RX ADMIN — OLANZAPINE 5 MG: 5 TABLET, ORALLY DISINTEGRATING ORAL at 22:24

## 2021-04-06 RX ADMIN — ENOXAPARIN SODIUM 40 MG: 40 INJECTION SUBCUTANEOUS at 08:52

## 2021-04-06 RX ADMIN — Medication 10 ML: at 22:25

## 2021-04-06 RX ADMIN — Medication 10 ML: at 13:52

## 2021-04-06 RX ADMIN — DIVALPROEX SODIUM 250 MG: 125 CAPSULE ORAL at 05:48

## 2021-04-06 RX ADMIN — DIVALPROEX SODIUM 250 MG: 125 CAPSULE, COATED PELLETS ORAL at 22:25

## 2021-04-06 RX ADMIN — DIVALPROEX SODIUM 125 MG: 125 CAPSULE ORAL at 13:52

## 2021-04-06 NOTE — PROGRESS NOTES
Report received from [facility]. Patient resting quietly in bed. Respirations present, even and unlabored on room air. Bed low and locked, bed alarm on, safety measures in place. Lap belt and mitts in place. Peg tube present, Jevity 1.5 running at 45mL/hr with a 115 mL water flush q4 hours. Patient tolerating tube feed well. Abdominal binder in place. No signs of distress, no needs noted by this RN. Will continue to monitor.

## 2021-04-06 NOTE — PROGRESS NOTES
Patient remains in stable condition with respirations even/unlabored. No acute distress noted. No needs noted or voiced at this time. Safety measures in place. Patient remains on room air. Tube feeding infusing @ 45 mL/hr. Call light remains within reach. Preparing to give report to oncoming shift.

## 2021-04-06 NOTE — PROGRESS NOTES
No acute events overnight. Patient resting quietly in bed. Respirations present, even and unlabored on room air. Bed low and locked, bed alarm on, safety measures in place. Lap belt and mitts in place. PEG tube present, Jevity 1.5 infusing at 45mL/hr, with a 115 mL water flush q4 hours. No signs of distress, no needs noted by this RN. Preparing to give report to oncoming RN.

## 2021-04-06 NOTE — PROGRESS NOTES
Progress Note    Patient: Mildred Rivera MRN: [id number]  SSN: [id number]    YOB: 1945  Age: 67 y.o. Sex: female      Admit Date: 3/22/2021    LOS: 15 days     Subjective:   As per prior documentation:    \"75 y. o. female with medical h/o dementia, schizophrenia who presented to [facility] ER with report of several weeks progressive confusion and worsening agitation/confusion/ shuffling gait for past 3 days. Patient is minimally conversant so history obtained from son at bedside. She lives with her son with other family helping at times. They are overwhelmed by her needs and are concerned for her safety due to her wandering behaviors. \" They would like her to be placed into a nursing home. UTI found. Urine culture with mixed colonies. Speech therapy has not cleared for regular or thick liquids due to patient pocketing her food. Spoken to family who want PEG tube. NG tube feedings started 3/25. PEG tube placed 3/29. Tolerating tube feedings well. Has psych medications for outburst. Since obtaining her home medications, agitated much improved. [facility] in [facility]. Not speaking this AM. No acute distress. \"      April 1, 2021- patient seen and evaluated. No adverse overnight events reported by nursing. She is afebrile. Son at bedside all questions answered. He would like Prolixin given if possible. Pharmacy consulted. The patient herself is unable to give a history secondary to her baseline medical condition. April 2, 2021-patient seen and evaluated. No adverse overnight events reported by nursing. She remains afebrile. She did get her Prolixin yesterday after case management arranged for her to come from her psych facility. No nausea vomiting shortness of breath or fevers. Son again visiting at bedside. All questions answered. April 3 2021-patient seen and evaluated. No adverse overnight events reported by nursing staff.   This a.m. she is attempting to get out of bed and asking me to pull her up. Can be redirected. April 4, 2021-patient seen and evaluated. No adverse overnight events reported by nursing staff overall less agitated but still attempting to get up on her own. April 5, 2021-patient seen and evaluated. She apparently pulled out her PEG tube last night and had to be replaced by GI. Calmer today and less agitated. Unable to give a history secondary to her current medical condition    April 6, 2021- patient seen and evaluated. Not agitated overnight but placed in mitts and a lap belt. She will need to be out of them for 48 hours prior to discharging to rehab. She was afebrile overnight. Has no complaints of pain.   Remains confused      Current Facility-Administered Medications   Medication Dose Route Frequency    divalproex (DEPAKOTE SPRINKLE) capsule 125 mg  125 mg Oral BID    OLANZapine (ZyPREXA zydis) disintegrating tablet 5 mg  5 mg Oral QHS    divalproex (DEPAKOTE SPRINKLE) capsule 250 mg  250 mg Oral QHS    deutetrabenazine tab 12 mg (Patient Supplied)  12 mg PEG Tube BID    fluPHENAZine decanoate (PROLIXIN) 25 mg/mL injection 50 mg (Patient Supplied)  50 mg IntraMUSCular EVERY 2 WEEKS    NUTRITIONAL SUPPORT ELECTROLYTE PRN ORDERS   Does Not Apply PRN    HYDROcodone-acetaminophen (NORCO) 5-325 mg per tablet 1 Tab  1 Tab Oral Q4H PRN    morphine injection 1 mg  1 mg IntraVENous Q4H PRN    sodium chloride (NS) flush 5-40 mL  5-40 mL IntraVENous Q8H    sodium chloride (NS) flush 5-40 mL  5-40 mL IntraVENous PRN    acetaminophen (TYLENOL) tablet 650 mg  650 mg Oral Q6H PRN    Or    acetaminophen (TYLENOL) suppository 650 mg  650 mg Rectal Q6H PRN    polyethylene glycol (MIRALAX) packet 17 g  17 g Oral DAILY PRN    promethazine (PHENERGAN) tablet 12.5 mg  12.5 mg Oral Q6H PRN    Or    ondansetron (ZOFRAN) injection 4 mg  4 mg IntraVENous Q6H PRN    enoxaparin (LOVENOX) injection 40 mg  40 mg SubCUTAneous DAILY    hydrALAZINE (APRESOLINE) 20 mg/mL injection 20 mg  20 mg IntraVENous Q6H PRN       Objective:     Vitals:    04/06/21 0515 04/06/21 0542 04/06/21 0733 04/06/21 1136   BP: (!) 153/94  95/62 106/74   Pulse: 68  76 73   Resp: 18  19 18   Temp: 98.5 °F (36.9 °C)  99.4 °F (37.4 °C) 98.7 °F (37.1 °C)   SpO2: 91%  95% 96%   Weight:  59.1 kg (130 lb 5.6 oz)     Height:             Intake and Output:  Current Shift: 04/06 0701 - 04/06 1900  In: 350   Out: -   Last three shifts: 04/04 1901 - 04/06 0700  In: 725   Out: -     Physical Exam:   General:  Alert, no distress, trying to get out of chair. Will answer no questions for me. Eyes:  Conjunctivae/corneas clear. Ears:  Normal TMs and external ear canals both ears. Nose: Nares normal. Septum midline. Mouth/Throat: Poor dentition   Neck:  no JVD. Back:   deferred. Lungs:   Clear to auscultation bilaterally. Heart:  Regular rate and rhythm, S1, S2 normal   Abdomen:   Soft, non-tender. Bowel sounds normal. PEG tube in place   Extremities: Muscle wasting, limited ROM   Pulses: 2+ and symmetric all extremities. Skin: Skin color, texture, turgor normal. No rashes or lesions   Lymph nodes: Cervical, supraclavicular, and axillary nodes normal.   Neurologic:  No noted outbursts. Lab/Data Review:    Recent Results (from the past 24 hour(s))   METABOLIC PANEL, BASIC    Collection Time: 04/06/21  6:03 AM   Result Value Ref Range    Sodium 141 136 - 145 mmol/L    Potassium 3.9 3.5 - 5.1 mmol/L    Chloride 107 98 - 107 mmol/L    CO2 28 21 - 32 mmol/L    Anion gap 6 (L) 7 - 16 mmol/L    Glucose 109 (H) 65 - 100 mg/dL    BUN 16 8 - 23 MG/DL    Creatinine 1.05 (H) 0.6 - 1.0 MG/DL    GFR est AA >60 >60 ml/min/1.73m2    GFR est non-AA 54 (L) >60 ml/min/1.73m2    Calcium 9.4 8.3 - 10.4 MG/DL       Assessment/ Plan:   April 6th, 2021-this assessment and plan remains unchanged unless specifically delineated below.   Principal Problem:    Acute metabolic encephalopathy (1/69/5200)    Active Problems:    Schizophrenia ([facility] [address] (3/24/2021)      Dementia ([facility] [address] (3/24/2021)      Severe protein-calorie malnutrition ([facility] [address] (3/25/2021)    Acute metabolic encephalopathy- Per son, seems to be near her baseline. She has been declining since December of last year. Family wanting nursing home. UTI- Ceftriaxone 3/23-3/28    Hypokalemia - Resolved. Schizophrenia -  austedo 12mg daily. She received a dose of Prolixin on April 1, 2021. This medication was due on March 31, 2021. Poor oral intake: S/p PEG tube 3/29. Tolerating tube feedings. Speech therapy has not cleared for regular or thick liquids due to patient pocketing her food. She pulled out the PEG tube on May 4, 2021. It was replaced by GI on the same date. An abdominal binder is in place and she is receiving her tube feeds    Agitation-patient started on Depakote. Also started on Zyprexa we will monitor and continue to titrate medications as needed    Case management is aware and is looking into nursing homes for placement. She needs to be out of restraints for 48 hours prior to discharging. Disposition:  To be determined    DVT prophylaxis - Lovenox  Signed By: Karlene Hobbs MD     April 6, 2021

## 2021-04-06 NOTE — PROGRESS NOTES
SBAR from [facility]. Patient in stable condition with resps even/unlabored. NAD noted. Patient on room air. Safety measures noted. Will continue to monitor per policy.

## 2021-04-07 LAB
ANION GAP SERPL CALC-SCNC: 2 MMOL/L (ref 7–16)
BUN SERPL-MCNC: 20 MG/DL (ref 8–23)
CALCIUM SERPL-MCNC: 9.1 MG/DL (ref 8.3–10.4)
CHLORIDE SERPL-SCNC: 107 MMOL/L (ref 98–107)
CO2 SERPL-SCNC: 31 MMOL/L (ref 21–32)
CREAT SERPL-MCNC: 1.01 MG/DL (ref 0.6–1)
GLUCOSE SERPL-MCNC: 95 MG/DL (ref 65–100)
MAGNESIUM SERPL-MCNC: 2.5 MG/DL (ref 1.8–2.4)
PHOSPHATE SERPL-MCNC: 4 MG/DL (ref 2.3–3.7)
POTASSIUM SERPL-SCNC: 4.1 MMOL/L (ref 3.5–5.1)
SODIUM SERPL-SCNC: 140 MMOL/L (ref 136–145)

## 2021-04-07 PROCEDURE — 97112 NEUROMUSCULAR REEDUCATION: CPT

## 2021-04-07 PROCEDURE — 97530 THERAPEUTIC ACTIVITIES: CPT

## 2021-04-07 PROCEDURE — 36415 COLL VENOUS BLD VENIPUNCTURE: CPT

## 2021-04-07 PROCEDURE — 84100 ASSAY OF PHOSPHORUS: CPT

## 2021-04-07 PROCEDURE — 83735 ASSAY OF MAGNESIUM: CPT

## 2021-04-07 PROCEDURE — 65270000029 HC RM PRIVATE

## 2021-04-07 PROCEDURE — 97168 OT RE-EVAL EST PLAN CARE: CPT

## 2021-04-07 PROCEDURE — 97535 SELF CARE MNGMENT TRAINING: CPT

## 2021-04-07 PROCEDURE — 74011250636 HC RX REV CODE- 250/636: Performed by: INTERNAL MEDICINE

## 2021-04-07 PROCEDURE — 80048 BASIC METABOLIC PNL TOTAL CA: CPT

## 2021-04-07 PROCEDURE — 74011250637 HC RX REV CODE- 250/637: Performed by: INTERNAL MEDICINE

## 2021-04-07 RX ORDER — OLANZAPINE 5 MG/1
5 TABLET, ORALLY DISINTEGRATING ORAL
Status: DISCONTINUED | OUTPATIENT
Start: 2021-04-07 | End: 2021-04-08

## 2021-04-07 RX ORDER — DIVALPROEX SODIUM 125 MG/1
250 CAPSULE, COATED PELLETS ORAL 2 TIMES DAILY
Status: DISCONTINUED | OUTPATIENT
Start: 2021-04-07 | End: 2021-04-08 | Stop reason: HOSPADM

## 2021-04-07 RX ADMIN — OLANZAPINE 5 MG: 5 TABLET, ORALLY DISINTEGRATING ORAL at 22:00

## 2021-04-07 RX ADMIN — ENOXAPARIN SODIUM 40 MG: 40 INJECTION SUBCUTANEOUS at 09:34

## 2021-04-07 RX ADMIN — Medication 10 ML: at 15:01

## 2021-04-07 RX ADMIN — DIVALPROEX SODIUM 250 MG: 125 CAPSULE, COATED PELLETS ORAL at 22:00

## 2021-04-07 RX ADMIN — Medication 10 ML: at 22:00

## 2021-04-07 RX ADMIN — Medication 10 ML: at 05:27

## 2021-04-07 RX ADMIN — DIVALPROEX SODIUM 250 MG: 125 CAPSULE ORAL at 15:01

## 2021-04-07 RX ADMIN — DIVALPROEX SODIUM 125 MG: 125 CAPSULE ORAL at 06:25

## 2021-04-07 RX ADMIN — OLANZAPINE 5 MG: 5 TABLET, ORALLY DISINTEGRATING ORAL at 15:01

## 2021-04-07 NOTE — PROGRESS NOTES
ACUTE PHYSICAL THERAPY GOALS:  (Developed with and agreed upon by patient and/or caregiver. )  LTG:  (1.)Ms. Sangita Mena will move from supine to sit and sit to supine, scoot up and down and roll side to side in bed INDEPENDENTLY with bed flat within 7 treatment day(s). VERY GOOD PROGRESS 4/5  (2.)Ms. Sangita Mena will transfer from bed to chair and chair to bed with SUPERVISION using the least restrictive device within 7 treatment day(s). GOOD PROGRESS 4/5  (3.)Ms. Sangita Mena will ambulate with SUPERVISION for 150 feet with the least restrictive device within 7 treatment day(s). UNTIL TODAY, GOOD PROGRESS, BUT RECENT DECLINE IN THIS GOAL, SEE NOTE  (4.)Ms. Sangita Mena will ascend and descend 10 steps with MINIMAL ASSIST using handrail(s) and/or handheld assist within 7 days. SLOW PROGRESS 4/5    PHYSICAL THERAPY: Daily Note and PM Treatment Day # 2    Luciana Bowser is a 67 y.o. female   PRIMARY DIAGNOSIS: Acute metabolic encephalopathy  Acute metabolic encephalopathy [F25.49]    3 Days Post-Op    ASSESSMENT:     REHAB RECOMMENDATIONS: CURRENT LEVEL OF FUNCTION:  (Most Recently Demonstrated)   Recommendation to date pending progress:  Setting:   Short-term Rehab  Equipment:    To Be Determined Bed Mobility:   Minimal Assistance  Sit to Stand:   Minimal Assistance   Transfers:   Minimal Assistance x 2   Gait/Mobility:   Minimal Assistance x 2 to mod A x2 briefly      ASSESSMENT:  Ms. Sangita Mena presents alert but with delayed responses at times and limited verbalizations. She performs bed mobility and transfers with min A of 1-2. Worked on standing static/dynamic balance and ambulation in room and hallway x several trials with wheelchair close by and min to briefly mod assist of 2 for safety and seated rest breaks between trials. Attempted seated exercises however pt has difficulty following commands to perform exercises. Returned to room and back to supine without complaints; need in reach and bed alarm on for safety.  Pt will benefit from rehab at [facility]. SUBJECTIVE:   Ms. Nereida Escudero has little verablizations    SOCIAL HISTORY/ LIVING ENVIRONMENT: Spoke with son (evangelist) who reports pt lives with him and another son and daughter in two story home. His siblings are both moving out soon and he works. Patients bedroom is upstairs; she has been unable to navigate steps for the past 3-4 months so has bee staying upstairs. Needs some assistance with ADLs. Denies DME use or falls prior to admission. States patients speech and memory have been drastically declining.    Home Environment: Private residence  One/Two Story Residence: Two story  Living Alone: No  Support Systems: Child(segundo)  OBJECTIVE:     PAIN: VITAL SIGNS: LINES/DRAINS:   Pre Treatment: Pain Screen  Pain Scale 1: FLACC  Pain Intensity 1: 0  Post Treatment: 0/10 FLACC Vital Signs  O2 Device: Room air PEG  O2 Device: Room air     MOBILITY: I Mod I S SBA CGA Min Mod Max Total  NT x2 Comments:   Bed Mobility    Rolling [] [] [] [] [] [x] [] [] [] [] []    Supine to Sit [] [] [] [] [] [x] [] [] [] [] [x]    Scooting [] [] [] [] [] [x] [] [] [] [] []    Sit to Supine [] [] [] [] [] [x] [] [] [] [] [x]    Transfers    Sit to Stand [] [] [] [] [] [x] [x] [] [] [] []    Bed to Chair [] [] [] [] [] [x] [] [] [] [] [x]    Stand to Sit [] [] [] [] [] [x] [] [] [] [] []    I=Independent, Mod I=Modified Independent, S=Supervision, SBA=Standby Assistance, CGA=Contact Guard Assistance,   Min=Minimal Assistance, Mod=Moderate Assistance, Max=Maximal Assistance, Total=Total Assistance, NT=Not Tested    BALANCE: Good Fair+ Fair Fair- Poor NT Comments   Sitting Static [] [x] [] [] [] []    Sitting Dynamic [] [x] [] [] [] []              Standing Static [] [] [x] [] [] []    Standing Dynamic [] [] [x] [] [] []      GAIT: I Mod I S SBA CGA Min Mod Max Total  NT x2 Comments:   Level of Assistance [] [] [] [] [] [x] [x] [] [] [] [x]    Distance 200' x3, 150'     DME min-mod HHA of 1-2    Gait Quality Shuffled, short steps    Weightbearing  Status N/A     I=Independent, Mod I=Modified Independent, S=Supervision, SBA=Standby Assistance, CGA=Contact Guard Assistance,   Min=Minimal Assistance, Mod=Moderate Assistance, Max=Maximal Assistance, Total=Total Assistance, NT=Not Tested    PLAN:   FREQUENCY/DURATION: PT Plan of Care: 3 times/week for duration of hospital stay or until stated goals are met, whichever comes first.  TREATMENT:     TREATMENT:   ($$ Therapeutic Activity: 38-52 mins    )  Co-Treatment PT/OT necessary due to patient's decreased overall endurance/tolerance levels, as well as need for high level skilled assistance to complete functional transfers/mobility and functional tasks  Therapeutic Activity (32 Minutes): Therapeutic activity included Rolling, Supine to Sit, Sit to Supine, Scooting, Transfer Training, Ambulation on level ground, Sitting balance , Standing balance and attempts at seated exercises (LAQ, marching) to improve functional Mobility, Strength, Activity tolerance and balance, gait safety, posture.      TREATMENT GRID:  N/A    AFTER TREATMENT POSITION/PRECAUTIONS:  Alarm Activated, Bed, Needs within reach and RN notified    INTERDISCIPLINARY COLLABORATION:  RN/PCT, PT/PTA and OT/HUERTAS    TOTAL TREATMENT DURATION:  PT Patient Time In/Time Out  Time In: 1406  Time Out: 1199 [facility], [place]

## 2021-04-07 NOTE — PROGRESS NOTES
Report received from [facility]. Patient resting in bed, patient is very restless. Respirations present, even and unlabored on room air. Bed low and locked, safety measures in place. No signs of distress, no needs noted by this RN. Will continue to monitor.

## 2021-04-07 NOTE — PROGRESS NOTES
Comprehensive Nutrition Assessment    Type and Reason for Visit: Reassess  Tube Feeding Management (Hospitalist)    Nutrition Recommendations/Plan:   Enteral Nutrition:   Change formula to Nepro via PEG at goal rate of 35ml/hour. Water flush 140ml Q4H. At goal will provide 1386 kcal (100% estimated calorie needs), 62 grams protein (100% estimated protein needs) and 1400ml free fluid (1ml/kcal) calculations based on 22 hours infusion. Vitamin and Mineral Supplement Therapy:  Electrolyte management replacement protocol active. Labs:   BMP daily, Mg and Phos MWF. Malnutrition Assessment:  Malnutrition Status: Severe malnutrition  Context: Chronic illness  Findings of clinical characteristics of malnutrition:   Energy Intake:  Mild decrease in energy intake (specify)(poor po intake reported per son)  Weight Loss:  7.0 - Greater than 7.5% over 3 months(133lbs at [facility] visit; 13% in 2 months)     Body Fat Loss:  7 - Severe body fat loss, Triceps, Orbital   Muscle Mass Loss:  1 - Mild muscle mass loss, Calf (gastrocnemius), Temples (temporalis), Thigh (quadraceps), Clavicles (pectoralis &deltoids)  Fluid Accumulation:  Unable to assess,     Strength:  Not performed     Nutrition Assessment:   Nutrition History: Unable to assess. Per SLP note on 3/24, son reports pt has had poor po intake since 3/18. Pt is taking a bite or 2 of foods such as mashed potatoes. Noted ED visit at [facility] on 1/2/21 due to cognitive decline. Per ED note, pt's son Tarun Sloan, stated pt was fine until December 26th and pt was unable to walk well, not eating, and unable to carry on conversations with family members. Noted pt has had a significant wt loss since office visit at [facility] on 1/28/21. Nutrition Background: Pt presents due to several weeks of progressive confusion and worsening agitation. PMH notable for dementia and schizophrenia.   Daily Update:  Observed TF infusing at 45ml/hr with free water flush of 115ml Q4H. Noted pt's mg and phos are elevated today. Pt has had BM x2 since last seen on 4/5. Discussed with RN change of formula today. Observed breakfast tray with 50% jello and 1/3 Ensure Clear consumed. Abdominal Status (last documented): Other (comment)(Peg tube) abdomen with Active  bowel sounds. Last BM 04/06/21. Pertinent Medications: PRN Miralax  Pertinent Labs:    Lab Results   Component Value Date/Time     04/07/2021 05:50 AM    K 4.1 04/07/2021 05:50 AM     04/07/2021 05:50 AM    CO2 31 04/07/2021 05:50 AM    AGAP 2 (L) 04/07/2021 05:50 AM    GLU 95 04/07/2021 05:50 AM    BUN 20 04/07/2021 05:50 AM    CREA 1.01 (H) 04/07/2021 05:50 AM    GFRAA >60 04/07/2021 05:50 AM    GFRNA 57 (L) 04/07/2021 05:50 AM    CA 9.1 04/07/2021 05:50 AM    MG 2.5 (H) 04/07/2021 05:50 AM    PHOS 4.0 (H) 04/07/2021 05:50 AM   Magnesium increasing since 4/5 and phos slightly lowered since 4/5 from 4.1 to 4.0 with x2 BM noted on 4/5 and 4/6. Nutrition Related Findings:   Bridled NGT placed 3/25; TF started 3/26; 20FR PEG placed 3/20, TF started 3/30 and tolerated at goal.; PEG pulled 4/4 with new PEG placed 4/4; TF resumed at goal rate 4/5; changed formula 4/7      Current Nutrition Therapies:  DIET NUTRITIONAL SUPPLEMENTS All Meals; Ensure Clear  DIET CLEAR LIQUID  DIET TUBE FEEDING Please open for rate and flush. Start once GI approves for PEG to be used for TF. Current Tube Feeding (TF) Orders:   · Feeding Route: PEG  · Formula: Jevity 1.5  · Schedule:Continuous    · Regimen: 25ml/hr; 20ml Q4H to 45ml/hr  · Additives/Modulars: (none ordered)  · Water Flushes: 115ml Q4H  · Current TF & Flush Orders Provides: at 25ml/hr meeting 83% estimated nutrition needs  · Goal TF & Flush Orders Provides: 1485 kcal (100% estimated calorie needs), 63 grams protein (100% estimated protein needs) VFM 9861MT free fluid (1ml/kcal) calculations based on 22 hours infusion.     Current Intake:   Average Meal Intake: 1-25%(CLQ) Average Supplement Intake: 1-25%(CLQ)      Anthropometric Measures:  Height: 5' 3\" (160 cm)  Current Body Wt: 55.6 kg (122 lb 9.2 oz)(4/6), Weight source: Not specified  BMI: 21.7, Underweight (BMI less than 22) age over 72  Admission Body Weight: 115 lb 8.3 oz(standing scale; 3/24)  Ideal Body Weight (lbs) (Calculated): 115 lbs (52 kg), 96.3 %  Usual Body Wt: 60.4 kg (133 lb 2.5 oz)(1/28/21;  [facility]), Percent weight change: -13.2          Edema: No data recorded   Estimated Daily Nutrient Needs:  Energy (kcal/day): 1245-9646 (Kcal/kg(25-30), Weight Used: Current(52.4kg))  Protein (g/day): 52-63(1-1.2gm/kg) Weight Used: (Current(52.4kg))  Fluid (ml/day):   (1 ml/kcal)    Nutrition Diagnosis:   · Severe malnutrition, In context of chronic illness related to cognitive or neurological impairment, inadequate protein-energy intake as evidenced by (criteria provided in malnutrition assessment above)    · Inadequate oral intake related to cognitive or neurological impairment, swallowing difficulty as evidenced by (PEG for primary nutrition)    Nutrition Interventions:   Food and/or Nutrient Delivery: Continue current diet, Continue oral nutrition supplement, Modify tube feeding     Coordination of Nutrition Care: Continue to monitor while inpatient  Plan of Care discussed with Lupillo Alves RN    Goals:   Previous Goal Met: Goal(s) achieved  Active Goal: Maintain estimated nutrition needs via EN during admission    Nutrition Monitoring and Evaluation:      Food/Nutrient Intake Outcomes: Food and nutrient intake, Supplement intake, Enteral nutrition intake/tolerance       Discharge Planning:    Enteral nutrition    Electronically signed by Renetta Ramírez MS, RDN, LD 4/7/2021 at 11:51 AM  Contact: [phone]

## 2021-04-07 NOTE — PROGRESS NOTES
CM spoke with the patient's sons yesterday. Sons want patient to discharge to [facility] with hospice care. CM faxed the LOC to [facility]. Jon Douglass with the admissions department at [facility] told CM that if the business office contacts patient's son and he is agreeable to pay the portion of the expense that is not covered then patient can discharge today. If son is not able to pay today then patient will likely have to discharge tomorrow.

## 2021-04-07 NOTE — PROGRESS NOTES
ACUTE OT GOALS:  (Developed with and agreed upon by patient and/or caregiver.)  1. Patient will bathe and dress upper body with moderate assistance and adaptive device as needed. 2. Patient will toilet with moderate assistance and adaptive device as needed. 3. Patient will tolerate 30 minutes of OT treatment with up to 2 rest breaks to increase activity tolerance for ADLs. 4. Patient will complete functional mobility for ADLs with stand by assistance and adaptive device as needed. 5. Patient will demonstrate improved cognition for ADLs by completing functional tasks with up to moderate cueing from therapist.  6. Patient will complete grooming with minimal assistance. Timeframe: 7 visits     OCCUPATIONAL THERAPY ASSESSMENT: Daily Note and Re-evaluation OT Treatment Day # 1    Elisa Kingston is a 67 y.o. female   PRIMARY DIAGNOSIS: Acute metabolic encephalopathy  Acute metabolic encephalopathy [Q91.51]  Procedure(s) (LRB):  ESOPHAGOGASTRODUODENOSCOPY (EGD) / PEG TUBE PLACEMENT (N/A)  3 Days Post-Op  Reason for Referral:  Weakness  ICD-10: Treatment Diagnosis: Generalized Muscle Weakness (M62.81)  Other lack of cordination (R27.8)  INPATIENT: [place] / Plan[place] B ONLY / Product Type: Medicare /   ASSESSMENT:     REHAB RECOMMENDATIONS:   Recommendation to date pending progress:  Setting:   Short-term Rehab  Equipment:    To Be Determined     PRIOR LEVEL OF FUNCTION:  (Prior to Hospitalization)  INITIAL/CURRENT LEVEL OF FUNCTION:  (Based on today's evaluation)   Bathing:   Minimal Assistance  Dressing:   Minimal Assistance  Feeding/Grooming:   Minimal Assistance  Toileting:   Minimal Assistance  Functional Mobility:   Independent Bathing:   Maximal Assistance  Dressing:   Maximal Assistance  Feeding/Grooming:   Maximal Assistance  Toileting:   Maximal Assistance  Functional Mobility:   Minimal Assistance x 2     ASSESSMENT:  Ms. Miguel Yuan presents with metabolic encephalopathy. Hx dementia, schizophrenia, per chart has had recent decline in memory and speech, increased confusion and wandering. At baseline pt lives with children, completes ADLs with some assistance and ambulates independently. Pt with decreased strength, balance, endurance, and cognition impacting mobility and ADLs. Pt practiced bed mobility with CGA/HHA, multiple functional transfer trials with CGA/cueing for safety and controlled descent. Practiced ambulation with HHAx2/MinAx1-2, rest breaks throughout, cues for safety. Brief change with ModA to doff brief, total assist for hygiene and donning clean one. Goals updated to better reflect current functional status. SUBJECTIVE:   Ms. Nereida Escudero states, \"My son might be here. \"     SOCIAL HISTORY/LIVING ENVIRONMENT: Per PT note, pt lives with children in a 2 level home with bedroom upstairs. Pt receives some assistance with ADLs, is independent with ambulation with no hx falls.  Pt's speech and memory have been drastically declining and pt will not have 24/7 supervision as son works   Home Environment: Private residence  [address] [place] [address] St: Two story  Living Alone: No  [place]: Child(segundo)    OBJECTIVE:     PAIN: VITAL SIGNS: LINES/DRAINS:   Pre Treatment: Pain Screen  Pain Scale 1: FLACC  Pain Intensity 1: 0  Post Treatment: same   PEG  O2 Device: Room air     GROSS EVALUATION:  BUEs Within Functional Limits Abnormal/ Functional Abnormal/ Non-Functional (see comments) Not Tested Comments:   AROM [] [x] [] []    PROM [] [] [] []    Strength [] [x] [] []    Balance [] [x] [] [] Intact sitting, fair to fair- standing   Posture [x] [] [] []    Sensation [] [] [] []    Coordination [x] [] [] []    Tone [] [] [] []    Edema [x] [] [] []    Activity Tolerance [] [x] [] []     [] [] [] []      COGNITION/  PERCEPTION: Intact Impaired   (see comments) Comments:   Orientation [] [x]    Vision [] []    Hearing [] []    Judgment/ Insight [] [x]    Attention [x] [] Memory [] [x]    Command Following [x] []    Emotional Regulation [] []     [] []      ACTIVITIES OF DAILY LIVING: I Mod I S SBA CGA Min Mod Max Total NT Comments   BASIC ADLs:              Bathing/ Showering [] [] [] [] [] [] [] [] [] [x]    Toileting [] [] [] [] [] [] [] [x] [] [] Pt able to push down and step out of soiled brief    Dressing [] [] [] [] [] [] [] [] [] [x]    Feeding [] [] [] [] [] [] [] [] [] [x]    Grooming [] [] [] [] [] [] [] [] [] [x]    Personal Device Care [] [] [] [] [] [] [] [] [] [x]    Functional Mobility [] [] [] [] [] [x] [] [] [] [] HHA x2   I=Independent, Mod I=Modified Independent, S=Supervision, SBA=Standby Assistance, CGA=Contact Guard Assistance,   Min=Minimal Assistance, Mod=Moderate Assistance, Max=Maximal Assistance, Total=Total Assistance, NT=Not Tested    MOBILITY: I Mod I S SBA CGA Min Mod Max Total  NT x2 Comments:   Supine to sit [] [] [] [x] [x] [] [] [] [] [] []    Sit to supine [] [] [] [x] [x] [] [] [] [] [] []    Sit to stand [] [] [] [] [x] [] [] [] [] [] [x]    Bed to chair [] [] [] [] [] [x] [] [] [] [] [x]    I=Independent, Mod I=Modified Independent, S=Supervision, SBA=Standby Assistance, CGA=Contact Guard Assistance,   Min=Minimal Assistance, Mod=Moderate Assistance, Max=Maximal Assistance, Total=Total Assistance, NT=Not Tested    [facility] [address] [id number] AM-PAC 6 Clicks   Daily Activity Inpatient Short Form        How much help from another person does the patient currently need. .. Total A Lot A Little None   1. Putting on and taking off regular lower body clothing? [] 1   [x] 2   [] 3   [] 4   2. Bathing (including washing, rinsing, drying)? [] 1   [x] 2   [] 3   [] 4   3. Toileting, which includes using toilet, bedpan or urinal?   [] 1   [x] 2   [] 3   [] 4   4. Putting on and taking off regular upper body clothing? [] 1   [x] 2   [] 3   [] 4   5. Taking care of personal grooming such as brushing teeth? [] 1   [x] 2   [] 3   [] 4   6.   Eating meals?   [] 1   [x] 2   [] 3   [] 4   © 2007, Trustees of [address] [place] [address] [address] [address], under license to [employer]. All rights reserved     Score:  Initial: 10 3/23/21 Most Recent: 12 (Date: 4/7/21 )   Interpretation of Tool:  Represents activities that are increasingly more difficult (i.e. Bed mobility, Transfers, Gait). PLAN:   FREQUENCY/DURATION: OT Plan of Care: 3 times/week for duration of hospital stay or until stated goals are met, whichever comes first.    PROBLEM LIST:   (Skilled intervention is medically necessary to address:)  1. Decreased ADL/Functional Activities  2. Decreased Activity Tolerance  3. Decreased AROM/PROM  4. Decreased Balance  5. Decreased Cognition  6. Decreased Gait Ability  7. Decreased Strength  8. Decreased Transfer Abilities   INTERVENTIONS PLANNED:   (Benefits and precautions of occupational therapy have been discussed with the patient.)  1. Self Care Training  2. Therapeutic Activity  3. Therapeutic Exercise/HEP  4. Neuromuscular Re-education  5. Gait Training  6. Education     TREATMENT:     EVALUATION: ReEvaluation : (Untimed Charge)    TREATMENT:   ($$ Self Care/Home Management: 8-22 mins$$ Neuromuscular Re-Education: 8-22 mins   )  Co-Treatment PT/OT necessary due to patient's decreased overall endurance/tolerance levels, as well as need for high level skilled assistance to complete functional transfers/mobility and functional tasks  Self Care (10 Minutes): Self care including Toileting and Lower Body Dressing to increase independence and decrease level of assistance required. Neuromuscular Re-education (13 Minutes): Neuromuscular Re-education included Balance Training, Coordination training, Functional mobility with facilitation, Postural training and Standing balance training to improve Balance, Coordination, Functional Mobility and Postural Control.     TREATMENT GRID:  N/A    AFTER TREATMENT POSITION/PRECAUTIONS:  Alarm Activated, Bed, Needs within reach and RN notified INTERDISCIPLINARY COLLABORATION:  RN/PCT, PT/PTA and OT/HUERTAS    TOTAL TREATMENT DURATION:  OT Patient Time In/Time Out  Time In: 1406  Time Out: Jordana 67, OTR/L

## 2021-04-07 NOTE — PROGRESS NOTES
Progress Note    Patient: Yimi Martinez MRN: [id number]  SSN: [id number]    YOB: 1945  Age: 67 y.o. Sex: female      Admit Date: 3/22/2021    LOS: 16 days     Subjective:   As per prior documentation:    \"75 y. o. female with medical h/o dementia, schizophrenia who presented to [facility] ER with report of several weeks progressive confusion and worsening agitation/confusion/ shuffling gait for past 3 days. Patient is minimally conversant so history obtained from son at bedside. She lives with her son with other family helping at times. They are overwhelmed by her needs and are concerned for her safety due to her wandering behaviors. \" They would like her to be placed into a nursing home. UTI found. Urine culture with mixed colonies. Speech therapy has not cleared for regular or thick liquids due to patient pocketing her food. Spoken to family who want PEG tube. NG tube feedings started 3/25. PEG tube placed 3/29. Tolerating tube feedings well. Has psych medications for outburst. Since obtaining her home medications, agitated much improved. Currently in mittens. Not speaking this AM. No acute distress. \"      April 1, 2021- patient seen and evaluated. No adverse overnight events reported by nursing. She is afebrile. Son at bedside all questions answered. He would like Prolixin given if possible. Pharmacy consulted. The patient herself is unable to give a history secondary to her baseline medical condition. April 2, 2021-patient seen and evaluated. No adverse overnight events reported by nursing. She remains afebrile. She did get her Prolixin yesterday after case management arranged for her to come from her psych facility. No nausea vomiting shortness of breath or fevers. Son again visiting at bedside. All questions answered. April 3 2021-patient seen and evaluated. No adverse overnight events reported by nursing staff.   This a.m. she is attempting to get out of bed and asking me to pull her up. Can be redirected. April 4, 2021-patient seen and evaluated. No adverse overnight events reported by nursing staff overall less agitated but still attempting to get up on her own. April 5, 2021-patient seen and evaluated. She apparently pulled out her PEG tube last night and had to be replaced by GI. Calmer today and less agitated. Unable to give a history secondary to her current medical condition    April 6, 2021- patient seen and evaluated. Not agitated overnight but placed in mitts and a lap belt. She will need to be out of them for 48 hours prior to discharging to rehab. She was afebrile overnight. Has no complaints of pain. Remains confused    April 7, 2021-patient seen and evaluated. Not agitated overnight she is out of mitts in a lapbelt. Unable to give a history secondary to her medical condition. She has been afebrile overnight.     Current Facility-Administered Medications   Medication Dose Route Frequency    divalproex (DEPAKOTE SPRINKLE) capsule 125 mg  125 mg Oral BID    OLANZapine (ZyPREXA zydis) disintegrating tablet 5 mg  5 mg Oral QHS    divalproex (DEPAKOTE SPRINKLE) capsule 250 mg  250 mg Oral QHS    deutetrabenazine tab 12 mg (Patient Supplied)  12 mg PEG Tube BID    fluPHENAZine decanoate (PROLIXIN) 25 mg/mL injection 50 mg (Patient Supplied)  50 mg IntraMUSCular EVERY 2 WEEKS    NUTRITIONAL SUPPORT ELECTROLYTE PRN ORDERS   Does Not Apply PRN    HYDROcodone-acetaminophen (NORCO) 5-325 mg per tablet 1 Tab  1 Tab Oral Q4H PRN    morphine injection 1 mg  1 mg IntraVENous Q4H PRN    sodium chloride (NS) flush 5-40 mL  5-40 mL IntraVENous Q8H    sodium chloride (NS) flush 5-40 mL  5-40 mL IntraVENous PRN    acetaminophen (TYLENOL) tablet 650 mg  650 mg Oral Q6H PRN    Or    acetaminophen (TYLENOL) suppository 650 mg  650 mg Rectal Q6H PRN    polyethylene glycol (MIRALAX) packet 17 g  17 g Oral DAILY PRN    promethazine (PHENERGAN) tablet 12.5 mg  12.5 mg Oral Q6H PRN    Or    ondansetron (ZOFRAN) injection 4 mg  4 mg IntraVENous Q6H PRN    enoxaparin (LOVENOX) injection 40 mg  40 mg SubCUTAneous DAILY    hydrALAZINE (APRESOLINE) 20 mg/mL injection 20 mg  20 mg IntraVENous Q6H PRN       Objective:     Vitals:    04/06/21 1606 04/06/21 1824 04/06/21 2333 04/07/21 0331   BP: 106/72 95/61 (!) 90/56 117/75   Pulse: 78 83 92 75   Resp: 19 16 17 18   Temp: 98.5 °F (36.9 °C) 99 °F (37.2 °C) 98.4 °F (36.9 °C) 98.4 °F (36.9 °C)   SpO2: 99% 95% 96% 100%   Weight:   55.6 kg (122 lb 9.6 oz)    Height:             Intake and Output:  Current Shift: No intake/output data recorded. Last three shifts: 04/05 1901 - 04/07 0700  In: 2491   Out: -     Physical Exam:   General:  Alert, no distress, trying to get out of chair. Will answer no questions for me. Eyes:  Conjunctivae/corneas clear. Ears:  Normal TMs and external ear canals both ears. Nose: Nares normal. Septum midline. Mouth/Throat: Poor dentition   Neck:  no JVD. Back:   deferred. Lungs:   Clear to auscultation bilaterally. Heart:  Regular rate and rhythm, S1, S2 normal   Abdomen:   Soft, non-tender. Bowel sounds normal. PEG tube in place   Extremities: Muscle wasting, limited ROM   Pulses: 2+ and symmetric all extremities. Skin: Skin color, texture, turgor normal. No rashes or lesions   Lymph nodes: Cervical, supraclavicular, and axillary nodes normal.   Neurologic:  No noted outbursts.         Lab/Data Review:    Recent Results (from the past 24 hour(s))   PHOSPHORUS    Collection Time: 04/07/21  5:50 AM   Result Value Ref Range    Phosphorus 4.0 (H) 2.3 - 3.7 MG/DL   MAGNESIUM    Collection Time: 04/07/21  5:50 AM   Result Value Ref Range    Magnesium 2.5 (H) 1.8 - 2.4 mg/dL   METABOLIC PANEL, BASIC    Collection Time: 04/07/21  5:50 AM   Result Value Ref Range    Sodium 140 136 - 145 mmol/L    Potassium 4.1 3.5 - 5.1 mmol/L    Chloride 107 98 - 107 mmol/L    CO2 31 21 - 32 mmol/L Anion gap 2 (L) 7 - 16 mmol/L    Glucose 95 65 - 100 mg/dL    BUN 20 8 - 23 MG/DL    Creatinine 1.01 (H) 0.6 - 1.0 MG/DL    GFR est AA >60 >60 ml/min/1.73m2    GFR est non-AA 57 (L) >60 ml/min/1.73m2    Calcium 9.1 8.3 - 10.4 MG/DL       Assessment/ Plan:   April 7th, 2021-this assessment and plan remains unchanged unless specifically delineated below. Principal Problem:    Acute metabolic encephalopathy (9/28/8151)    Active Problems:    Schizophrenia ([facility] [address] [facility] (3/24/2021)      Dementia ([facility] (3/24/2021)      Severe protein-calorie malnutrition ([facility] [address] (3/25/2021)    Acute metabolic encephalopathy- Per son, seems to be near her baseline. She has been declining since December of last year. Family wanting nursing home. UTI- Ceftriaxone 3/23-3/28    Hypokalemia - Resolved. Schizophrenia -  austedo 12mg daily. She received a dose of Prolixin on April 1, 2021. This medication was due on March 31, 2021. Poor oral intake: S/p PEG tube 3/29. Tolerating tube feedings. Speech therapy has not cleared for regular or thick liquids due to patient pocketing her food. She pulled out the PEG tube on May 4, 2021. It was replaced by GI on the same date. An abdominal binder is in place and she is receiving her tube feeds    Agitation-patient started on Depakote. Also started on Zyprexa we will monitor and continue to titrate medications as needed    Case management is aware and is looking into nursing homes for placement. She needs to be out of restraints for 48 hours prior to discharging.   Hopefully she can discharge tomorrow  Disposition: Hopefully to skilled nursing facility/rehab tomorrow    DVT prophylaxis - Lovenox  Signed By: Harriet Lynn MD     April 7, 2021

## 2021-04-07 NOTE — PROGRESS NOTES
Problem: Falls - Risk of  Goal: *Absence of Falls  Description: Document Eugene Conti Fall Risk and appropriate interventions in the flowsheet.   Outcome: Progressing Towards Goal  Note: Fall Risk Interventions:  Mobility Interventions: Bed/chair exit alarm    Mentation Interventions: Bed/chair exit alarm    Medication Interventions: Bed/chair exit alarm    Elimination Interventions: Call light in reach    History of Falls Interventions: Bed/chair exit alarm

## 2021-04-07 NOTE — PROGRESS NOTES
Patient resting quietly in bed. Respirations even and unlabored. On room air. PEG tube feeding infusing at 45 mL/hr. No signs of distress. No needs expressed. To give report to oncoming RN.

## 2021-04-08 VITALS
OXYGEN SATURATION: 95 % | BODY MASS INDEX: 21.93 KG/M2 | SYSTOLIC BLOOD PRESSURE: 101 MMHG | DIASTOLIC BLOOD PRESSURE: 63 MMHG | TEMPERATURE: 98 F | WEIGHT: 123.8 LBS | HEART RATE: 79 BPM | HEIGHT: 63 IN | RESPIRATION RATE: 17 BRPM

## 2021-04-08 LAB
ANION GAP SERPL CALC-SCNC: 5 MMOL/L (ref 7–16)
BUN SERPL-MCNC: 19 MG/DL (ref 8–23)
CALCIUM SERPL-MCNC: 9.5 MG/DL (ref 8.3–10.4)
CHLORIDE SERPL-SCNC: 107 MMOL/L (ref 98–107)
CO2 SERPL-SCNC: 29 MMOL/L (ref 21–32)
CREAT SERPL-MCNC: 0.97 MG/DL (ref 0.6–1)
GLUCOSE SERPL-MCNC: 89 MG/DL (ref 65–100)
POTASSIUM SERPL-SCNC: 3.9 MMOL/L (ref 3.5–5.1)
SODIUM SERPL-SCNC: 141 MMOL/L (ref 136–145)

## 2021-04-08 PROCEDURE — 74011250637 HC RX REV CODE- 250/637: Performed by: INTERNAL MEDICINE

## 2021-04-08 PROCEDURE — 74011250636 HC RX REV CODE- 250/636: Performed by: INTERNAL MEDICINE

## 2021-04-08 PROCEDURE — 36415 COLL VENOUS BLD VENIPUNCTURE: CPT

## 2021-04-08 PROCEDURE — 80048 BASIC METABOLIC PNL TOTAL CA: CPT

## 2021-04-08 RX ORDER — DIVALPROEX SODIUM 125 MG/1
250 CAPSULE, COATED PELLETS ORAL 3 TIMES DAILY
Qty: 120 CAP | Refills: 0 | Status: SHIPPED
Start: 2021-04-08

## 2021-04-08 RX ORDER — OLANZAPINE 10 MG/1
10 TABLET, ORALLY DISINTEGRATING ORAL
Qty: 15 TAB | Refills: 0 | Status: SHIPPED
Start: 2021-04-08

## 2021-04-08 RX ORDER — OLANZAPINE 10 MG/1
10 TABLET, ORALLY DISINTEGRATING ORAL DAILY
Qty: 15 TAB | Refills: 0 | Status: SHIPPED
Start: 2021-04-09

## 2021-04-08 RX ORDER — OLANZAPINE 5 MG/1
10 TABLET, ORALLY DISINTEGRATING ORAL DAILY
Status: DISCONTINUED | OUTPATIENT
Start: 2021-04-08 | End: 2021-04-08 | Stop reason: HOSPADM

## 2021-04-08 RX ORDER — OLANZAPINE 5 MG/1
10 TABLET, ORALLY DISINTEGRATING ORAL
Status: DISCONTINUED | OUTPATIENT
Start: 2021-04-08 | End: 2021-04-08 | Stop reason: HOSPADM

## 2021-04-08 RX ADMIN — ENOXAPARIN SODIUM 40 MG: 40 INJECTION SUBCUTANEOUS at 10:19

## 2021-04-08 RX ADMIN — DIVALPROEX SODIUM 250 MG: 125 CAPSULE ORAL at 05:27

## 2021-04-08 RX ADMIN — OLANZAPINE 10 MG: 5 TABLET, ORALLY DISINTEGRATING ORAL at 10:19

## 2021-04-08 RX ADMIN — Medication 10 ML: at 06:00

## 2021-04-08 NOTE — PROGRESS NOTES
Patient is discharging to [facility] to a long term bed. LOC approved and faxed over to [facility]. CM called patient's son and made him aware of discharge. Room and report line given to RN. CM remains available.

## 2021-04-08 NOTE — PROGRESS NOTES
Verbal report given to [facility] on Lance Hinton  being transferred to [facility] for routine progression of care       Report consisted of patients Situation, Background, Assessment and   Recommendations(SBAR). Information from the following report(s) SBAR was reviewed with the receiving nurse. Opportunity for questions and clarification was provided.       Patient transported with:  Enriqueta Chua

## 2021-04-08 NOTE — DISCHARGE SUMMARY
[address] [facility] Hospitalist Discharge Summary     Name:  Mildred Rivera    Age:75 y.o. Sex:female  :  1945       MRN:  [id number]       Admitting Physician: Laura Conley DO Admit Date: 3/22/2021 11:31 AM   Attending Physician: Pedro Eid MD  Primary Care Physician: Didi Crawley MD       Discharge Physician: Wil Mendez MD  Discharge date: 21   Discharged Condition: Stable    Indication for Admission:   Chief Complaint   Patient presents with    Altered mental status        Reasons for hospitalization:  Hospital Problems as of 2021 Date Reviewed: 2021          Codes Class Noted - Resolved POA    Severe protein-calorie malnutrition ([facility] [address] ICD-10-CM: E43  ICD-9-CM: 262  3/25/2021 - Present Yes        Schizophrenia ([facility] [address] ICD-10-CM: F20.9  ICD-9-CM: 295.90  3/24/2021 - Present Unknown        Dementia ([facility] [address] ICD-10-CM: F03.90  ICD-9-CM: 294.20  3/24/2021 - Present Unknown        * (Principal) Acute metabolic encephalopathy GQU-16-NO: G93.41  ICD-9-CM: 348.31  3/22/2021 - Present Unknown                 Discharge Diagnosis: 1) Acute metabolic encephalopathy secondary to UTI 2)  Schizophrenia and dementia 3) poor oral intake 4) Agitation  Did Patient have Sepsis (YES OR NO): No    Hospital Course/Interval history:  \"71 y. o. female with medical h/o dementia, schizophrenia who presented to [facility] ER with report of several weeks progressive confusion and worsening agitation/confusion/ shuffling gait for past 3 days. Patient is minimally conversant so history obtained from son at bedside. She lives with her son with other family helping at times. They are overwhelmed by her needs and are concerned for her safety due to her wandering behaviors. \" They would like her to be placed into a nursing home. UTI found. Urine culture with mixed colonies. Speech therapy has not cleared for regular or thick liquids due to patient pocketing her food. Spoken to family who want PEG tube.  NG tube feedings started 3/25. PEG tube placed 3/29. Tolerating tube feedings well.      Has psych medications for outburst. Since obtaining her home medications, agitated much improved. Currently in mittens.      Not speaking this AM. No acute distress. \"        The patient was admitted to medications adjusted. Case management working on placement. Unfortunately  prior to discharge she has been placing weights again with a lapbelt. These have been removed. And we are monitoring with plans to discharge to rehab    Assessment/Plan:  Acute metabolic encephalopathy-secondary to UTI. Resolved. Per son, seems to be near her baseline. She has been declining since December of last year. Family wanting nursing home.      UTI-status post ceftriaxone 3/23-3/28     Hypokalemia - Resolved.      Schizophrenia -  austedo 12mg daily. She received a dose of Prolixin on April 1, 2021. Prolixin is to be given every 2 weeks. She should receive another dose on April 15, 2020.      Poor oral intake: S/p PEG tube 3/29. Tolerating tube feedings. Speech therapy has not cleared for regular or thick liquids due to patient pocketing her food. She pulled out the PEG tube on May 4, 2021. It was replaced by GI on the same date. An abdominal binder is in place and she is receiving her tube feeds     Agitation-patient started on Depakote. Also started on Zyprexa. Continue to titrate medications as needed. Could consider adding Namenda and Aricept at a later date. Will defer to MD rehab.     Consults:   IP CONSULT TO PSYCHIATRY  IP CONSULT TO PALLIATIVE CARE - PROVIDER  IP CONSULT TO GASTROENTEROLOGY  IP CONSULT TO OB GYN     Disposition:   Diet:   DIET NUTRITIONAL SUPPLEMENTS  DIET TUBE FEEDING  DIET CLEAR LIQUID  Code Status: Full Code      Follow up labs/imaging: None   Follow up with your PCP in 1 week   pending labs/studies: None    Current Discharge Medication List      START taking these medications    Details   !! OLANZapine (ZyPREXA zydis) 10 mg disintegrating tablet Take 1 Tab by mouth daily. Qty: 15 Tab, Refills: 0      !! OLANZapine (ZyPREXA zydis) 10 mg disintegrating tablet Take 1 Tab by mouth nightly. Qty: 15 Tab, Refills: 0      divalproex (DEPAKOTE SPRINKLE) 125 mg capsule Take 2 Caps by mouth three (3) times daily. Capsules can be swallowed whole or opened with contents sprinkled on soft food such as apple sauce or pudding. DO NOT crush or chew the sprinkles. Hazardous medication: Use proper procedures for handling and disposal of hazardous medications. Qty: 120 Cap, Refills: 0       !! - Potential duplicate medications found. Please discuss with provider. CONTINUE these medications which have NOT CHANGED    Details   fluPHENAZine decanoate (PROLIXIN) 25 mg/mL injection 2.5 mg by IntraMUSCular route Once every 2 weeks. Patient family and psychiatrist paperwork state Prolixin 2.5 mg/ml IM every two weeks. Indications: schizophrenia      deutetrabenazine (Austedo) 12 mg tab Take 12 mg by mouth two (2) times a day.  Indications: tardive dyskinesia, a disorder characterized by involuntary movements of the face, mouth and tongue             Medications Discontinued During This Encounter   Medication Reason    haloperidol lactate (HALDOL) 5 mg/mL injection Returned to ADS    haloperidol lactate (HALDOL) 5 mg/mL injection Returned to ADS    deutetrabenazine tab 12 mg (Patient Supplied)     ceFAZolin (ANCEF) 1 g in 0.9% sodium chloride (MBP/ADV) 50 mL MBP     ceFAZolin (ANCEF) 2 g/20 mL in sterile water IV syringe     dextrose 5% infusion     lactated Ringers infusion     sodium chloride (NS) flush 5-40 mL     sodium chloride (NS) flush 5-40 mL     lactated Ringers infusion     0.9% sodium chloride infusion     famotidine (PF) (PEPCID) 20 mg in 0.9% sodium chloride 10 mL injection     deutetrabenazine tab 12 mg (Patient Supplied)     fluPHENAZine (PROLIXIN) injection 2.5 mg (Patient Supplied)     divalproex (DEPAKOTE SPRINKLE) capsule 125 mg     lactated Ringers infusion Patient Transfer    divalproex (DEPAKOTE SPRINKLE) capsule 125 mg     divalproex (DEPAKOTE SPRINKLE) capsule 250 mg     divalproex (DEPAKOTE SPRINKLE) capsule 125 mg     OLANZapine (ZyPREXA zydis) disintegrating tablet 5 mg     OLANZapine (ZyPREXA zydis) disintegrating tablet 5 mg          Follow Up Orders:  No orders of the defined types were placed in this encounter. Follow-up Information     Follow up With Specialties Details Why [address] [facility] [place] [address] [place] [address]  [phone]            Discharge Exam:    Patient Vitals for the past 24 hrs:   Temp Pulse Resp BP SpO2   04/08/21 0729 98.1 °F (36.7 °C) 75 17 109/71 95 %   04/08/21 0327 98.2 °F (36.8 °C) 86 18 122/74 95 %   04/07/21 2320 98.1 °F (36.7 °C) 76 18 99/63 98 %   04/07/21 1943 98 °F (36.7 °C) 90 19 125/78 95 %   04/07/21 1554 97.9 °F (36.6 °C) 89 18 121/73 98 %   04/07/21 1202 98.4 °F (36.9 °C) 73 18 95/63 95 %     Oxygen Therapy  O2 Sat (%): 95 % (04/08/21 0729)  Pulse via Oximetry: 81 beats per minute (04/04/21 2159)  O2 Device: Room air (04/08/21 0327)  O2 Flow Rate (L/min): 0 l/min (04/04/21 2159)    Estimated body mass index is 21.93 kg/m² as calculated from the following:    Height as of this encounter: 5' 3\" (1.6 m). Weight as of this encounter: 56.2 kg (123 lb 12.8 oz). Intake/Output Summary (Last 24 hours) at 4/8/2021 1115  Last data filed at 4/8/2021 1019  Gross per 24 hour   Intake 735 ml   Output --   Net 735 ml       *Note that automatically entered I/Os may not be accurate; dependent on patient compliance with collection and accurate  by assistants. Physical Exam:   General:  Elderly female in no acute distress, not speaking to me but does speak to nursing requesting things like Jell-O and coffee.   No use of accessory muscles   HEENT: Pupils equal and reactive to light and accommodation, oropharynx is clear   Neck:   Supple, no lymphadenopathy, no JVD   Lungs:  Clear to auscultation bilaterally   CV:  Regular rate and rhythm with normal S1 and S2   Abdomen: Soft, nontender, nondistended, normoactive bowel sounds. PEG tube in situ with abdominal binder. Extremities:  No cyanosis clubbing or edema   Neuro:  Nonfocal, A&O x3   Psych:  Normal affect       All Labs from Last 24 Hrs:  Recent Results (from the past 24 hour(s))   METABOLIC PANEL, BASIC    Collection Time: 04/08/21  5:19 AM   Result Value Ref Range    Sodium 141 136 - 145 mmol/L    Potassium 3.9 3.5 - 5.1 mmol/L    Chloride 107 98 - 107 mmol/L    CO2 29 21 - 32 mmol/L    Anion gap 5 (L) 7 - 16 mmol/L    Glucose 89 65 - 100 mg/dL    BUN 19 8 - 23 MG/DL    Creatinine 0.97 0.6 - 1.0 MG/DL    GFR est AA >60 >60 ml/min/1.73m2    GFR est non-AA 59 (L) >60 ml/min/1.73m2    Calcium 9.5 8.3 - 10.4 MG/DL       All Micro Results     Procedure Component Value Units Date/Time    COVID-19 RAPID TEST [[id number]] Collected: 04/02/21 1003    Order Status: Completed Specimen: Nasopharyngeal Updated: 04/02/21 1102     Specimen source NASAL        COVID-19 rapid test Not detected        Comment:      The specimen is NEGATIVE for SARS-CoV-2, the novel coronavirus associated with COVID-19. A negative result does not rule out COVID-19. This test has been authorized by the FDA under an Emergency Use Authorization (EUA) for use by authorized laboratories.         Fact sheet for Healthcare Providers: ConventionUpdate.co.nz  Fact sheet for Patients: ConventionUpdate.co.nz       Methodology: Isothermal Nucleic Acid Amplification         CULTURE, BLOOD [[id number]] Collected: 03/22/21 1355    Order Status: Completed Specimen: Blood Updated: 03/27/21 1307     Special Requests: --        RIGHT  Antecubital       Culture result: NO GROWTH 5 DAYS       CULTURE, BLOOD [[id number]] Collected: 03/22/21 1355    Order Status: Completed Specimen: Blood Updated: 03/27/21 1307     Special Requests: --        RIGHT  HAND       Culture result: NO GROWTH 5 DAYS       CULTURE, URINE [[id number]] Collected: 03/22/21 1727    Order Status: Completed Specimen: Urine from Clean catch Updated: 03/25/21 0723     Special Requests: NO SPECIAL REQUESTS        Culture result:       <10,000 COLONIES/mL MIXED SKIN KO ISOLATED          WET PREP [[id number]] Collected: 03/24/21 1748    Order Status: Completed Specimen: Vagina Updated: 03/24/21 1800     Special Requests: NO SPECIAL REQUESTS        Wet prep 3 TO 5 WBC/HPF     Wet prep       NO YEAST,TRICHOMONAS OR CLUE CELLS NOTED                SARS-CoV-2 Lab Results  \"Novel Coronavirus\" Test: No results found for: COV2NT   \"Emergent Disease\" Test: No results found for: EDPR  \"SARS-COV-2\" Test: No results found for: XGCOVT  Rapid Test:   Lab Results   Component Value Date/Time    COVR Not detected 04/02/2021 10:03 AM            Diagnostic Imaging/Tests:   No results found. Echocardiogram/EKG results:  No results found for this visit on 03/22/21. EKG Results     Procedure 720 Value Units Date/Time    EKG, 12 LEAD, INITIAL [[id number]] Collected: 03/22/21 1252    Order Status: Completed Updated: 03/22/21 2038     Ventricular Rate 85 BPM      Atrial Rate 85 BPM      P-R Interval 118 ms      QRS Duration 62 ms      Q-T Interval 342 ms      QTC Calculation (Bezet) 406 ms      Calculated P Axis 80 degrees      Calculated R Axis 82 degrees      Calculated T Axis 70 degrees      Diagnosis --     !! AGE AND GENDER SPECIFIC ECG ANALYSIS !!   Normal sinus rhythm  Possible Left atrial enlargement  Borderline ECG  When compared with ECG of 22-MAR-2021 12:50,  Premature ventricular complexes are no longer Present  Premature supraventricular complexes are no longer Present  Confirmed by Bev Maravilla ([id number]) on 3/22/2021 8:38:17 PM            Results for orders placed or performed during the hospital encounter of 03/22/21   EKG, 12 LEAD, INITIAL   Result Value Ref Range    Ventricular Rate 85 BPM    Atrial Rate 85 BPM    P-R Interval 118 ms    QRS Duration 62 ms    Q-T Interval 342 ms    QTC Calculation (Bezet) 406 ms    Calculated P Axis 80 degrees    Calculated R Axis 82 degrees    Calculated T Axis 70 degrees    Diagnosis       !! AGE AND GENDER SPECIFIC ECG ANALYSIS !! Normal sinus rhythm  Possible Left atrial enlargement  Borderline ECG  When compared with ECG of 22-MAR-2021 12:50,  Premature ventricular complexes are no longer Present  Premature supraventricular complexes are no longer Present  Confirmed by Gurjit Gaona ([id number]) on 3/22/2021 8:38:17 PM         Procedures done this admission:  Procedure(s):  ESOPHAGOGASTRODUODENOSCOPY (EGD) / PEG TUBE PLACEMENT        Time spent in patient discharge planning and coordination 38 minutes.       Signed By: Chuck Hi MD   [facility] Hospitalist Service    April 8, 2021  5:07 PM

## 2021-04-08 NOTE — PROGRESS NOTES
Patient very restless overnight. Patient currently resting in bed. Respirations present, even and unlabored on room air. Bed low and locked, safety measures in place. No signs of distress, no needs noted by this RN. Preparing to give report to oncoming RN.

## 2021-04-13 ENCOUNTER — PATIENT OUTREACH (OUTPATIENT)
Dept: CASE MANAGEMENT | Age: 76
End: 2021-04-13

## 2021-04-13 NOTE — PROGRESS NOTES
Patient identified for and enrolled in UTI bundle. Patient d/c to LTC. No FRANCK indicated at this time. CTN to follow patient via chart review only during remainder of bundle episode due to D/C to LTC.

## 2021-04-22 NOTE — PROGRESS NOTES
Physician Progress Note      PATIENT:               Griselda Rodrigez  CSN #:                  [id number]  :                       1945  ADMIT DATE:       3/22/2021 11:31 AM  DISCH DATE:        2021 1:07 PM  RESPONDING  PROVIDER #:        Sharad FLETCHER DO          QUERY TEXT:    Patient admitted with acute metabolic encephalopathy due to UTI. In order to support the diagnosis of acute metabolic encephalopathy, please include additional clinical indicators in your documentation. Or please document if the diagnosis of acute metabolic encephalopathy has been ruled out after further study. The medical record reflects the following:  Risk Factors: UTI  Clinical Indicators: \"several weeks progressive confusion and worsening agitation/confusion/shuffling gait\"  Treatment: serial labs, sitter ordered, consult Case Management  Options provided:  -- Acute metabolic encephalopathy present as evidenced by, Please document evidence. -- Acute metabolic encephalopathy was ruled out  -- Other - I will add my own diagnosis  -- Disagree - Not applicable / Not valid  -- Disagree - Clinically unable to determine / Unknown  -- Refer to Clinical Documentation Reviewer    PROVIDER RESPONSE TEXT:    Acute metabolic encephalopathy is present as evidenced by progressive confusion, decreased alertness, agitation    Query created by: Danial Pierre on 2021 8:39 AM      QUERY TEXT:    Patient admitted with acute metabolic encephalopathy due to UTI. In order to support the diagnosis of UTI, please include additional clinical indicators in your documentation. Or please document if the diagnosis of  has been ruled out after further study.     The medical record reflects the following:  Risk Factors: elderly female patient with dementia, AMS  Clinical Indicators: UA with 4+ bacteria WBC 20-50 Urine culture: \"<10,000 COLONIES/mL MIXED SKIN KO ISOLATED \"  Treatment: IV Rocephin  Options provided:  -- UTI present as evidenced by, Please document evidence. -- UTI was ruled out  -- Other - I will add my own diagnosis  -- Disagree - Not applicable / Not valid  -- Disagree - Clinically unable to determine / Unknown  -- Refer to Clinical Documentation Reviewer    PROVIDER RESPONSE TEXT:    UTI is present as evidenced by UA showing bacturia, pyuria and clinical picture of associated AMS.     Query created by: Elise Matthews on 4/16/2021 8:32 AM      Electronically signed by:  Benito Andrews DO 4/22/2021 12:59 PM

## 2022-02-21 ENCOUNTER — HOSPITAL ENCOUNTER (EMERGENCY)
Age: 77
Discharge: HOME | End: 2022-02-21
Attending: EMERGENCY MEDICINE
Payer: MEDICARE

## 2022-02-21 VITALS
HEART RATE: 89 BPM | RESPIRATION RATE: 18 BRPM | OXYGEN SATURATION: 96 % | SYSTOLIC BLOOD PRESSURE: 149 MMHG | DIASTOLIC BLOOD PRESSURE: 77 MMHG | TEMPERATURE: 98 F

## 2022-02-21 DIAGNOSIS — F03.91 DEMENTIA WITH BEHAVIORAL DISTURBANCE, UNSPECIFIED DEMENTIA TYPE: Primary | ICD-10-CM

## 2022-02-21 LAB
ALBUMIN SERPL-MCNC: 3.2 G/DL (ref 3.2–4.6)
ALBUMIN/GLOB SERPL: 1 {RATIO} (ref 1.2–3.5)
ALP SERPL-CCNC: 68 U/L (ref 50–136)
ALT SERPL-CCNC: 15 U/L (ref 12–65)
ANION GAP SERPL CALC-SCNC: 10 MMOL/L (ref 7–16)
APPEARANCE UR: CLEAR
AST SERPL-CCNC: 24 U/L (ref 15–37)
BACTERIA URNS QL MICRO: 0 /HPF
BASOPHILS # BLD: 0 K/UL (ref 0–0.2)
BASOPHILS NFR BLD: 0 % (ref 0–2)
BILIRUB SERPL-MCNC: 0.4 MG/DL (ref 0.2–1.1)
BILIRUB UR QL: ABNORMAL
BUN SERPL-MCNC: 18 MG/DL (ref 8–23)
CALCIUM SERPL-MCNC: 8.7 MG/DL (ref 8.3–10.4)
CASTS URNS QL MICRO: ABNORMAL /LPF
CHLORIDE SERPL-SCNC: 110 MMOL/L (ref 98–107)
CO2 SERPL-SCNC: 26 MMOL/L (ref 21–32)
COLOR UR: ABNORMAL
CREAT SERPL-MCNC: 1 MG/DL (ref 0.6–1)
DIFFERENTIAL METHOD BLD: ABNORMAL
EOSINOPHIL # BLD: 0 K/UL (ref 0–0.8)
EOSINOPHIL NFR BLD: 0 % (ref 0.5–7.8)
EPI CELLS #/AREA URNS HPF: ABNORMAL /HPF
ERYTHROCYTE [DISTWIDTH] IN BLOOD BY AUTOMATED COUNT: 17.1 % (ref 11.9–14.6)
GLOBULIN SER CALC-MCNC: 3.1 G/DL (ref 2.3–3.5)
GLUCOSE SERPL-MCNC: 81 MG/DL (ref 65–100)
GLUCOSE UR STRIP.AUTO-MCNC: NEGATIVE MG/DL
HCT VFR BLD AUTO: 33.8 % (ref 35.8–46.3)
HGB BLD-MCNC: 10.7 G/DL (ref 11.7–15.4)
HGB UR QL STRIP: NEGATIVE
IMM GRANULOCYTES # BLD AUTO: 0 K/UL (ref 0–0.5)
IMM GRANULOCYTES NFR BLD AUTO: 1 % (ref 0–5)
KETONES UR QL STRIP.AUTO: 15 MG/DL
LEUKOCYTE ESTERASE UR QL STRIP.AUTO: NEGATIVE
LYMPHOCYTES # BLD: 1.1 K/UL (ref 0.5–4.6)
LYMPHOCYTES NFR BLD: 43 % (ref 13–44)
MCH RBC QN AUTO: 29.6 PG (ref 26.1–32.9)
MCHC RBC AUTO-ENTMCNC: 31.7 G/DL (ref 31.4–35)
MCV RBC AUTO: 93.6 FL (ref 79.6–97.8)
MONOCYTES # BLD: 0.2 K/UL (ref 0.1–1.3)
MONOCYTES NFR BLD: 9 % (ref 4–12)
NEUTS SEG # BLD: 1.2 K/UL (ref 1.7–8.2)
NEUTS SEG NFR BLD: 48 % (ref 43–78)
NITRITE UR QL STRIP.AUTO: NEGATIVE
NRBC # BLD: 0 K/UL (ref 0–0.2)
PH UR STRIP: 6 [PH] (ref 5–9)
PLATELET # BLD AUTO: 98 K/UL (ref 150–450)
PMV BLD AUTO: 10.1 FL (ref 9.4–12.3)
POTASSIUM SERPL-SCNC: 3.8 MMOL/L (ref 3.5–5.1)
PROT SERPL-MCNC: 6.3 G/DL (ref 6.3–8.2)
PROT UR STRIP-MCNC: ABNORMAL MG/DL
RBC # BLD AUTO: 3.61 M/UL (ref 4.05–5.2)
RBC #/AREA URNS HPF: ABNORMAL /HPF
SODIUM SERPL-SCNC: 146 MMOL/L (ref 136–145)
SP GR UR REFRACTOMETRY: 1.02 (ref 1–1.02)
UROBILINOGEN UR QL STRIP.AUTO: 1 EU/DL (ref 0.2–1)
VALPROATE SERPL-MCNC: 72 UG/ML (ref 50–100)
WBC # BLD AUTO: 2.5 K/UL (ref 4.3–11.1)
WBC URNS QL MICRO: ABNORMAL /HPF

## 2022-02-21 PROCEDURE — 80164 ASSAY DIPROPYLACETIC ACD TOT: CPT

## 2022-02-21 PROCEDURE — 81003 URINALYSIS AUTO W/O SCOPE: CPT

## 2022-02-21 PROCEDURE — 85025 COMPLETE CBC W/AUTO DIFF WBC: CPT

## 2022-02-21 PROCEDURE — 74011000250 HC RX REV CODE- 250: Performed by: EMERGENCY MEDICINE

## 2022-02-21 PROCEDURE — 96372 THER/PROPH/DIAG INJ SC/IM: CPT

## 2022-02-21 PROCEDURE — 74011250636 HC RX REV CODE- 250/636: Performed by: EMERGENCY MEDICINE

## 2022-02-21 PROCEDURE — 80053 COMPREHEN METABOLIC PANEL: CPT

## 2022-02-21 PROCEDURE — 99285 EMERGENCY DEPT VISIT HI MDM: CPT

## 2022-02-21 RX ORDER — DIPHENHYDRAMINE HYDROCHLORIDE 50 MG/ML
25 INJECTION, SOLUTION INTRAMUSCULAR; INTRAVENOUS
Status: COMPLETED | OUTPATIENT
Start: 2022-02-21 | End: 2022-02-21

## 2022-02-21 RX ADMIN — DIPHENHYDRAMINE HYDROCHLORIDE 25 MG: 50 INJECTION, SOLUTION INTRAMUSCULAR; INTRAVENOUS at 18:53

## 2022-02-21 RX ADMIN — WATER 10 MG: 1 INJECTION INTRAMUSCULAR; INTRAVENOUS; SUBCUTANEOUS at 18:53

## 2022-02-21 NOTE — ED PROVIDER NOTES
Patient presents to the ER with son with concern of worsening confusion. Patient has a history of schizophrenia as well as dementia. Son report for the past month she has had a gradual decline as far as her increased agitation, confusion and restlessness. However reports of the past week seems to have increased. States he has been able to get her to take her medications. Denies any fevers or vomiting. Patient did reach out to patient's primary care physician as well as  who suggested she come to the ER for evaluation for possibly metabolic issues. The history is provided by the patient and a caregiver. The history is limited by the condition of the patient. Altered mental status   This is a new problem. The current episode started more than 1 week ago. The problem has been gradually worsening. Associated symptoms include confusion and agitation. Pertinent negatives include no seizures. Risk factors include dementia. Her past medical history is significant for psychotropic medication treatment. Past Medical History:   Diagnosis Date    Cancer [facility])     Renal Cell     Psychiatric disorder     Schizophrenia, Dementia       Past Surgical History:   Procedure Laterality Date    HX OTHER SURGICAL      1 kidney removed         No family history on file.     Social History     Socioeconomic History    Marital status: SINGLE     Spouse name: Not on file    Number of children: Not on file    Years of education: Not on file    Highest education level: Not on file   Occupational History    Not on file   Tobacco Use    Smoking status: Not on file    Smokeless tobacco: Not on file   Substance and Sexual Activity    Alcohol use: Not on file    Drug use: Not on file    Sexual activity: Not on file   Other Topics Concern    Not on file   Social History Narrative    Not on file     Social Determinants of Health     Financial Resource Strain:     Difficulty of Paying Living Expenses: Not on file   Food Insecurity:     Worried About Running Out of Food in the Last Year: Not on file    Jovanni of Food in the Last Year: Not on file   Transportation Needs:     Lack of Transportation (Medical): Not on file    Lack of Transportation (Non-Medical): Not on file   Physical Activity:     Days of Exercise per Week: Not on file    Minutes of Exercise per Session: Not on file   Stress:     Feeling of Stress : Not on file   Social Connections:     Frequency of Communication with Friends and Family: Not on file    Frequency of Social Gatherings with Friends and Family: Not on file    Attends Episcopal Services: Not on file    Active Member of [address] [place] [address] [employer] or Organizations: Not on file    Attends Club or Organization Meetings: Not on file    Marital Status: Not on file   Intimate Partner Violence:     Fear of Current or Ex-Partner: Not on file    Emotionally Abused: Not on file    Physically Abused: Not on file    Sexually Abused: Not on file   Housing Stability:     Unable to Pay for Housing in the Last Year: Not on file    Number of [place] in the Last Year: Not on file    Unstable Housing in the Last Year: Not on file         ALLERGIES: Patient has no known allergies. Review of Systems   Constitutional: Negative for fever. HENT: Negative for congestion and trouble swallowing. Respiratory: Negative for choking, chest tightness and shortness of breath. Cardiovascular: Negative for leg swelling. Gastrointestinal: Negative for vomiting. Genitourinary: Negative for urgency. Musculoskeletal: Negative for back pain and gait problem. Skin: Negative for color change and pallor. Neurological: Negative for seizures. Hematological: Negative for adenopathy. Does not bruise/bleed easily. Psychiatric/Behavioral: Positive for agitation and confusion. All other systems reviewed and are negative.       Vitals:    02/21/22 1659 02/21/22 1701   BP: (!) 149/98 (!) 141/103   Pulse: (!) 102 (!) 109   Resp: 18    Temp: 97.2 °F (36.2 °C)    SpO2: 93%             Physical Exam  Vitals and nursing note reviewed. Constitutional:       General: She is not in acute distress. Appearance: Normal appearance. She is not ill-appearing. HENT:      Right Ear: External ear normal.      Left Ear: External ear normal.      Nose: Nose normal. No congestion or rhinorrhea. Mouth/Throat:      Pharynx: No oropharyngeal exudate or posterior oropharyngeal erythema. Eyes:      General:         Right eye: No discharge. Left eye: No discharge. Extraocular Movements: Extraocular movements intact. Pupils: Pupils are equal, round, and reactive to light. Cardiovascular:      Rate and Rhythm: Normal rate and regular rhythm. Pulses: Normal pulses. Heart sounds: Normal heart sounds. Pulmonary:      Effort: Pulmonary effort is normal.      Breath sounds: Normal breath sounds. No stridor. No wheezing or rhonchi. Abdominal:      General: Abdomen is flat. There is no distension. Palpations: Abdomen is soft. Tenderness: There is no abdominal tenderness. Musculoskeletal:         General: No swelling, tenderness or deformity. Cervical back: Normal range of motion and neck supple. Right lower leg: No edema. Left lower leg: No edema. Skin:     Capillary Refill: Capillary refill takes less than 2 seconds. Coloration: Skin is not jaundiced or pale. Findings: No bruising or erythema. Neurological:      General: No focal deficit present. Cranial Nerves: No cranial nerve deficit. Coordination: Coordination normal.   Psychiatric:         Behavior: Behavior is agitated and hyperactive. MDM  Number of Diagnoses or Management Options  Diagnosis management comments: Patient appears somewhat agitated and confused        She likely needs to have some testing performed.   However we will need to give her medications to try to have her somewhat sedated    9:44 PM  Laboratory testing here stable including urinalysis. Likely some degree of dehydration given ketones in urine with sodium of 146. Nevertheless I feel she is stable for discharge. Discussed this with the family. We will leave information with case management as I feel likely need more resources given patient's worsening dementia. Amount and/or Complexity of Data Reviewed  Clinical lab tests: ordered and reviewed    Risk of Complications, Morbidity, and/or Mortality  Presenting problems: moderate  Diagnostic procedures: moderate  Management options: moderate    Patient Progress  Patient progress: stable         Procedures                   Results Include:    Recent Results (from the past 24 hour(s))   CBC WITH AUTOMATED DIFF    Collection Time: 02/21/22  8:16 PM   Result Value Ref Range    WBC 2.5 (L) 4.3 - 11.1 K/uL    RBC 3.61 (L) 4.05 - 5.2 M/uL    HGB 10.7 (L) 11.7 - 15.4 g/dL    HCT 33.8 (L) 35.8 - 46.3 %    MCV 93.6 79.6 - 97.8 FL    MCH 29.6 26.1 - 32.9 PG    MCHC 31.7 31.4 - 35.0 g/dL    RDW 17.1 (H) 11.9 - 14.6 %    PLATELET 98 (L) 922 - 450 K/uL    MPV 10.1 9.4 - 12.3 FL    ABSOLUTE NRBC 0.00 0.0 - 0.2 K/uL    DF AUTOMATED      NEUTROPHILS 48 43 - 78 %    LYMPHOCYTES 43 13 - 44 %    MONOCYTES 9 4.0 - 12.0 %    EOSINOPHILS 0 (L) 0.5 - 7.8 %    BASOPHILS 0 0.0 - 2.0 %    IMMATURE GRANULOCYTES 1 0.0 - 5.0 %    ABS. NEUTROPHILS 1.2 (L) 1.7 - 8.2 K/UL    ABS. LYMPHOCYTES 1.1 0.5 - 4.6 K/UL    ABS. MONOCYTES 0.2 0.1 - 1.3 K/UL    ABS. EOSINOPHILS 0.0 0.0 - 0.8 K/UL    ABS. BASOPHILS 0.0 0.0 - 0.2 K/UL    ABS. IMM.  GRANS. 0.0 0.0 - 0.5 K/UL   METABOLIC PANEL, COMPREHENSIVE    Collection Time: 02/21/22  8:16 PM   Result Value Ref Range    Sodium 146 (H) 136 - 145 mmol/L    Potassium 3.8 3.5 - 5.1 mmol/L    Chloride 110 (H) 98 - 107 mmol/L    CO2 26 21 - 32 mmol/L    Anion gap 10 7 - 16 mmol/L    Glucose 81 65 - 100 mg/dL    BUN 18 8 - 23 MG/DL    Creatinine 1.00 0.6 - 1.0 MG/DL    GFR est AA >60 >60 ml/min/1.73m2    GFR est non-AA 57 (L) >60 ml/min/1.73m2    Calcium 8.7 8.3 - 10.4 MG/DL    Bilirubin, total 0.4 0.2 - 1.1 MG/DL    ALT (SGPT) 15 12 - 65 U/L    AST (SGOT) 24 15 - 37 U/L    Alk. phosphatase 68 50 - 136 U/L    Protein, total 6.3 6.3 - 8.2 g/dL    Albumin 3.2 3.2 - 4.6 g/dL    Globulin 3.1 2.3 - 3.5 g/dL    A-G Ratio 1.0 (L) 1.2 - 3.5     VALPROIC ACID    Collection Time: 02/21/22  8:16 PM   Result Value Ref Range    Valproic acid 72 50 - 100 ug/ml   URINALYSIS W/ RFLX MICROSCOPIC    Collection Time: 02/21/22  8:56 PM   Result Value Ref Range    Color GLADIS      Appearance CLEAR      Specific gravity 1.017 1.001 - 1.023      pH (UA) 6.0 5.0 - 9.0      Protein TRACE (A) NEG mg/dL    Glucose Negative mg/dL    Ketone 15 (A) NEG mg/dL    Bilirubin SMALL (A) NEG      Blood Negative NEG      Urobilinogen 1.0 0.2 - 1.0 EU/dL    Nitrites Negative NEG      Leukocyte Esterase Negative NEG      WBC 0-3 0 /hpf    RBC 3-5 0 /hpf    Epithelial cells 0-3 0 /hpf    Bacteria 0 0 /hpf    Casts 3-5 0 /lpf     Voice dictation software was used during the making of this note. This software is not perfect and grammatical and other typographical errors may be present. This note has been proofread, but may still contain errors.   Tarun Encarnacion MD; 2/21/2022 @9:45 PM   ===================================================================

## 2022-02-21 NOTE — ED TRIAGE NOTES
Pt ambulatory to triage. Pt has hx of dementia and schizophrenia and currently pt has increased confusion per the son. Pt has acted this way in the past with a UTI, per son, pt states she has increased her urination over the past few days, foul smelling, pt does wear briefs. . Pt is not currently processing anything and is almost non-verbal at baseline. Pt's depakote was recently increased, pt takes this medication for the schizophrenia. Pt lives with son.

## 2022-02-22 NOTE — DISCHARGE INSTRUCTIONS
Follow-up with your primary care physician  Follow-up with your geriatric physician as well  Return to the ER for any new, worsening or life-threatening symptom

## 2022-02-22 NOTE — ED NOTES
I have reviewed discharge instructions with the caregiver. The caregiver verbalized understanding. Patient left ED via Discharge Method: wheelchair to Home with son. Opportunity for questions and clarification provided. Patient given 0 scripts. To continue your aftercare when you leave the hospital, you may receive an automated call from our care team to check in on how you are doing. This is a free service and part of our promise to provide the best care and service to meet your aftercare needs.  If you have questions, or wish to unsubscribe from this service please call [phone]. Thank you for Choosing our [facility] Emergency [facility].

## 2022-02-23 NOTE — PROGRESS NOTES
Chart review complete, CM had message from MD Marty Sparrow with request to follow up with family for assistance in the home for pt. CM attempted to contact son Eddie Hardin  [phone], unable to reach son, message left for return call CM will remain available to assist as needed.

## 2022-03-19 PROBLEM — F03.90 DEMENTIA (HCC): Status: ACTIVE | Noted: 2021-03-24

## 2022-03-19 PROBLEM — F20.9 SCHIZOPHRENIA (HCC): Status: ACTIVE | Noted: 2021-03-24

## 2022-03-19 PROBLEM — G93.41 ACUTE METABOLIC ENCEPHALOPATHY: Status: ACTIVE | Noted: 2021-03-22

## 2022-03-19 PROBLEM — E43 SEVERE PROTEIN-CALORIE MALNUTRITION (HCC): Status: ACTIVE | Noted: 2021-03-25

## 2023-02-09 NOTE — PROGRESS NOTES
No signs of distress noted. No needs expressed at this time. Safety measures in place. Family at the bedside. Report given to Paula Salazar RN. 4 = No assist / stand by assistance

## 2023-05-24 RX ORDER — FLUPHENAZINE DECANOATE 25 MG/ML
2.5 INJECTION, SOLUTION INTRAMUSCULAR; SUBCUTANEOUS
COMMUNITY

## 2023-05-24 RX ORDER — DEUTETRABENAZINE 12 MG/1
12 TABLET, COATED ORAL 2 TIMES DAILY
COMMUNITY

## 2025-02-25 NOTE — PROGRESS NOTES
Patient remains restless at this time. Patient turned herself around to position her head at the foot of the bed and her feet toward the head. This RN asked patient if she was uncomfortable or if she needed anything, to which she replied \"no\". Attempts to redirect patient remain unsuccessful at this time, as patient still biting at mitts and putting both mitts up at her NG tube and making \"swatting\" motions at her nose. Safety sitter remains at bedside. You can access the FollowMyHealth Patient Portal offered by [facility] by registering at the following website: [email or web address]. By joining [employer]’s FollowMyHealth portal, you will also be able to view your health information using other applications (apps) compatible with our system.

## 2025-06-10 NOTE — PROGRESS NOTES
Detail Level: Detailed provided calming presence to the patient   she has on mitts   reassured her that everything was ok    she mumbles

## (undated) DEVICE — KIT GASTMY PERC PEG PULL 20FR -- ENDOVIVE BX/2

## (undated) DEVICE — SYR 5ML 1/5 GRAD LL NSAF LF --

## (undated) DEVICE — KENDALL RADIOLUCENT FOAM MONITORING ELECTRODE RECTANGULAR SHAPE: Brand: KENDALL

## (undated) DEVICE — CANNULA NSL ORAL AD FOR CAPNOFLEX CO2 O2 AIRLFE

## (undated) DEVICE — CONNECTOR TBNG OD5-7MM O2 END DISP

## (undated) DEVICE — NDL PRT INJ NSAF BLNT 18GX1.5 --

## (undated) DEVICE — ABDOMINAL BINDER: Brand: DEROYAL

## (undated) DEVICE — BINDER ABD LG 46-62IN LF --

## (undated) DEVICE — SYR 3ML LL TIP 1/10ML GRAD --

## (undated) DEVICE — BLOCK BITE AD 60FR W/ VELC STRP ADDRESSES MOST PT AND